# Patient Record
Sex: MALE | Race: WHITE | NOT HISPANIC OR LATINO | Employment: OTHER | URBAN - METROPOLITAN AREA
[De-identification: names, ages, dates, MRNs, and addresses within clinical notes are randomized per-mention and may not be internally consistent; named-entity substitution may affect disease eponyms.]

---

## 2019-03-12 ENCOUNTER — APPOINTMENT (OUTPATIENT)
Dept: PREADMISSION TESTING | Facility: HOSPITAL | Age: 54
End: 2019-03-12

## 2019-03-12 VITALS
TEMPERATURE: 97.3 F | BODY MASS INDEX: 31.84 KG/M2 | HEART RATE: 114 BPM | RESPIRATION RATE: 18 BRPM | OXYGEN SATURATION: 97 % | HEIGHT: 69 IN | DIASTOLIC BLOOD PRESSURE: 92 MMHG | SYSTOLIC BLOOD PRESSURE: 137 MMHG | WEIGHT: 215 LBS

## 2019-03-12 LAB
ANION GAP SERPL CALCULATED.3IONS-SCNC: 15.6 MMOL/L
BUN BLD-MCNC: 20 MG/DL (ref 6–20)
BUN/CREAT SERPL: 25 (ref 7–25)
CALCIUM SPEC-SCNC: 10 MG/DL (ref 8.6–10.5)
CHLORIDE SERPL-SCNC: 97 MMOL/L (ref 98–107)
CO2 SERPL-SCNC: 23.4 MMOL/L (ref 22–29)
CREAT BLD-MCNC: 0.8 MG/DL (ref 0.76–1.27)
DEPRECATED RDW RBC AUTO: 41.2 FL (ref 37–54)
ERYTHROCYTE [DISTWIDTH] IN BLOOD BY AUTOMATED COUNT: 11.9 % (ref 12.3–15.4)
GFR SERPL CREATININE-BSD FRML MDRD: 100 ML/MIN/1.73
GLUCOSE BLD-MCNC: 334 MG/DL (ref 65–99)
HCT VFR BLD AUTO: 49.5 % (ref 37.5–51)
HGB BLD-MCNC: 16.6 G/DL (ref 13–17.7)
MCH RBC QN AUTO: 31.6 PG (ref 26.6–33)
MCHC RBC AUTO-ENTMCNC: 33.5 G/DL (ref 31.5–35.7)
MCV RBC AUTO: 94.3 FL (ref 79–97)
PLATELET # BLD AUTO: 209 10*3/MM3 (ref 140–450)
PMV BLD AUTO: 9.9 FL (ref 6–12)
POTASSIUM BLD-SCNC: 4.4 MMOL/L (ref 3.5–5.2)
RBC # BLD AUTO: 5.25 10*6/MM3 (ref 4.14–5.8)
SODIUM BLD-SCNC: 136 MMOL/L (ref 136–145)
WBC NRBC COR # BLD: 7.81 10*3/MM3 (ref 3.4–10.8)

## 2019-03-12 PROCEDURE — 93010 ELECTROCARDIOGRAM REPORT: CPT | Performed by: INTERNAL MEDICINE

## 2019-03-12 PROCEDURE — 93005 ELECTROCARDIOGRAM TRACING: CPT

## 2019-03-12 PROCEDURE — 85027 COMPLETE CBC AUTOMATED: CPT | Performed by: UROLOGY

## 2019-03-12 PROCEDURE — 80048 BASIC METABOLIC PNL TOTAL CA: CPT | Performed by: UROLOGY

## 2019-03-12 PROCEDURE — 36415 COLL VENOUS BLD VENIPUNCTURE: CPT

## 2019-03-12 RX ORDER — MONTELUKAST SODIUM 10 MG/1
10 TABLET ORAL NIGHTLY
COMMUNITY
End: 2020-10-22

## 2019-03-12 RX ORDER — TIZANIDINE 4 MG/1
4 TABLET ORAL 3 TIMES DAILY PRN
COMMUNITY

## 2019-03-12 RX ORDER — HYDROCODONE BITARTRATE AND ACETAMINOPHEN 7.5; 325 MG/1; MG/1
1 TABLET ORAL EVERY 6 HOURS PRN
COMMUNITY
End: 2019-03-14 | Stop reason: HOSPADM

## 2019-03-12 RX ORDER — OXCARBAZEPINE 300 MG/1
300 TABLET, FILM COATED ORAL NIGHTLY
COMMUNITY
End: 2020-10-22

## 2019-03-12 RX ORDER — ROSUVASTATIN CALCIUM 5 MG/1
5 TABLET, COATED ORAL NIGHTLY
COMMUNITY

## 2019-03-12 RX ORDER — GABAPENTIN 300 MG/1
600 CAPSULE ORAL 3 TIMES DAILY
COMMUNITY

## 2019-03-12 RX ORDER — BENZTROPINE MESYLATE 1 MG/1
1 TABLET ORAL 2 TIMES DAILY
COMMUNITY

## 2019-03-12 RX ORDER — ZOLPIDEM TARTRATE 10 MG/1
10 TABLET ORAL NIGHTLY PRN
COMMUNITY
End: 2020-10-22

## 2019-03-12 RX ORDER — QUETIAPINE FUMARATE 100 MG/1
100 TABLET, FILM COATED ORAL NIGHTLY
COMMUNITY
End: 2020-10-22

## 2019-03-12 RX ORDER — ALBUTEROL SULFATE 90 UG/1
2 AEROSOL, METERED RESPIRATORY (INHALATION) EVERY 4 HOURS PRN
COMMUNITY

## 2019-03-12 RX ORDER — DIAZEPAM 5 MG/1
5 TABLET ORAL 2 TIMES DAILY PRN
COMMUNITY
End: 2020-10-22

## 2019-03-12 NOTE — DISCHARGE INSTRUCTIONS
ARRIVE DAY OF SURGERY AT 11:30 AM TO MAIN SURGERY          Take the following medications the morning of surgery with a small sip of water:    ALBUTEROL INHALER    General Instructions:  • Do not eat or drink anything after midnight the night before surgery.  • Infants may have breast milk up to four hours before surgery.  • Infants drinking formula may drink formula up to six hours before surgery.   • Patients who avoid smoking, chewing tobacco and alcohol for 4 weeks prior to surgery have a reduced risk of post-operative complications.  Quit smoking as many days before surgery as you can.  • Do not smoke, use chewing tobacco or drink alcohol the day of surgery.   • If applicable bring your C-PAP/ BI-PAP machine.  • Bring any papers given to you in the doctor’s office.  • Wear clean comfortable clothes and socks.  • Do not wear contact lenses or make-up.  Bring a case for your glasses.   • Bring crutches or walker if applicable.  • Remove all piercings.  Leave jewelry and any other valuables at home.  • Hair extensions with metal clips must be removed prior to surgery.  • The Pre-Admission Testing nurse will instruct you to bring medications if unable to obtain an accurate list in Pre-Admission Testing.          Preventing a Surgical Site Infection:  • For 2 to 3 days before surgery, avoid shaving with a razor because the razor can irritate skin and make it easier to develop an infection.    • Any areas of open skin can increase the risk of a post-operative wound infection by allowing bacteria to enter and travel throughout the body.  Notify your surgeon if you have any skin wounds / rashes even if it is not near the expected surgical site.  The area will need assessed to determine if surgery should be delayed until it is healed.  • The night prior to surgery sleep in a clean bed with clean clothing.  Do not allow pets to sleep with you.  • Shower on the morning of surgery using a fresh bar of anti-bacterial soap  (such as Dial) and clean washcloth.  Dry with a clean towel and dress in clean clothing.  • Ask your surgeon if you will be receiving antibiotics prior to surgery.  • Make sure you, your family, and all healthcare providers clean their hands with soap and water or an alcohol based hand  before caring for you or your wound.    Day of surgery:  Upon arrival, a Pre-op nurse and Anesthesiologist will review your health history, obtain vital signs, and answer questions you may have.  The only belongings needed at this time will be your home medications and if applicable your C-PAP/BI-PAP machine.  If you are staying overnight your family can leave the rest of your belongings in the car and bring them to your room later.  A Pre-op nurse will start an IV and you may receive medication in preparation for surgery, including something to help you relax.  Your family will be able to see you in the Pre-op area.  While you are in surgery your family should notify the waiting room  if they leave the waiting room area and provide a contact phone number.    Please be aware that surgery does come with discomfort.  We want to make every effort to control your discomfort so please discuss any uncontrolled symptoms with your nurse.   Your doctor will most likely have prescribed pain medications.      If you are going home after surgery you will receive individualized written care instructions before being discharged.  A responsible adult must drive you to and from the hospital on the day of your surgery and stay with you for 24 hours.    If you are staying overnight following surgery, you will be transported to your hospital room following the recovery period.  [facility] has all private rooms.    You have received a list of surgical assistants for your reference.  If you have any questions please call Pre-Admission Testing at [phone].  Deductibles and co-payments are collected on the day of  service. Please be prepared to pay the required co-pay, deductible or deposit on the day of service as defined by your plan.

## 2019-03-13 ENCOUNTER — ANESTHESIA EVENT (OUTPATIENT)
Dept: PERIOP | Facility: HOSPITAL | Age: 54
End: 2019-03-13

## 2019-03-13 ENCOUNTER — ANESTHESIA (OUTPATIENT)
Dept: PERIOP | Facility: HOSPITAL | Age: 54
End: 2019-03-13

## 2019-03-13 ENCOUNTER — HOSPITAL ENCOUNTER (OUTPATIENT)
Facility: HOSPITAL | Age: 54
Discharge: HOME | End: 2019-03-14
Attending: UROLOGY | Admitting: UROLOGY

## 2019-03-13 DIAGNOSIS — N52.01 ERECTILE DYSFUNCTION DUE TO ARTERIAL INSUFFICIENCY: Primary | ICD-10-CM

## 2019-03-13 LAB
GLUCOSE BLDC GLUCOMTR-MCNC: 252 MG/DL (ref 70–130)
GLUCOSE BLDC GLUCOMTR-MCNC: 270 MG/DL (ref 70–130)
GLUCOSE BLDC GLUCOMTR-MCNC: 272 MG/DL (ref 70–130)

## 2019-03-13 PROCEDURE — 25010000002 PROPOFOL 10 MG/ML EMULSION: Performed by: ANESTHESIOLOGY

## 2019-03-13 PROCEDURE — 63710000001 HYDROCODONE-ACETAMINOPHEN 7.5-325 MG TABLET: Performed by: ANESTHESIOLOGY

## 2019-03-13 PROCEDURE — 63710000001 SENNA-DOCUSATE 8.6-50 MG TABLET: Performed by: UROLOGY

## 2019-03-13 PROCEDURE — C1813 PROSTHESIS, PENILE, INFLATAB: HCPCS | Performed by: UROLOGY

## 2019-03-13 PROCEDURE — 63710000001 ROSUVASTATIN 5 MG TABLET: Performed by: UROLOGY

## 2019-03-13 PROCEDURE — 63710000001 QUETIAPINE 100 MG TABLET: Performed by: UROLOGY

## 2019-03-13 PROCEDURE — 63710000001 MONTELUKAST 10 MG TABLET: Performed by: UROLOGY

## 2019-03-13 PROCEDURE — 63710000001 OXCARBAZEPINE 300 MG TABLET: Performed by: UROLOGY

## 2019-03-13 PROCEDURE — A9270 NON-COVERED ITEM OR SERVICE: HCPCS | Performed by: UROLOGY

## 2019-03-13 PROCEDURE — 25010000002 ONDANSETRON PER 1 MG: Performed by: ANESTHESIOLOGY

## 2019-03-13 PROCEDURE — G0378 HOSPITAL OBSERVATION PER HR: HCPCS

## 2019-03-13 PROCEDURE — 25010000002 FLUCONAZOLE PER 200 MG: Performed by: UROLOGY

## 2019-03-13 PROCEDURE — 25010000002 HYDROMORPHONE 1 MG/ML SOLUTION: Performed by: UROLOGY

## 2019-03-13 PROCEDURE — 25010000002 ONDANSETRON PER 1 MG: Performed by: UROLOGY

## 2019-03-13 PROCEDURE — A9270 NON-COVERED ITEM OR SERVICE: HCPCS | Performed by: HOSPITALIST

## 2019-03-13 PROCEDURE — 25010000002 GENTAMICIN PER 80 MG: Performed by: UROLOGY

## 2019-03-13 PROCEDURE — A9270 NON-COVERED ITEM OR SERVICE: HCPCS | Performed by: ANESTHESIOLOGY

## 2019-03-13 PROCEDURE — 63710000001 BENZTROPINE 1 MG TABLET: Performed by: UROLOGY

## 2019-03-13 PROCEDURE — 63710000001 METOPROLOL TARTRATE 25 MG TABLET: Performed by: HOSPITALIST

## 2019-03-13 PROCEDURE — 25010000002 FENTANYL CITRATE (PF) 100 MCG/2ML SOLUTION: Performed by: ANESTHESIOLOGY

## 2019-03-13 PROCEDURE — 25010000002 HYDROMORPHONE PER 4 MG: Performed by: NURSE ANESTHETIST, CERTIFIED REGISTERED

## 2019-03-13 PROCEDURE — 63710000001 INSULIN LISPRO (HUMAN) PER 5 UNITS: Performed by: HOSPITALIST

## 2019-03-13 PROCEDURE — 63710000001 GABAPENTIN 300 MG CAPSULE: Performed by: UROLOGY

## 2019-03-13 PROCEDURE — 63710000001 CARIPRAZINE HCL 1.5 MG CAPSULE: Performed by: UROLOGY

## 2019-03-13 PROCEDURE — 63710000001 HYDROCODONE-ACETAMINOPHEN 7.5-325 MG TABLET: Performed by: UROLOGY

## 2019-03-13 PROCEDURE — 25010000002 HYDROMORPHONE PER 4 MG: Performed by: ANESTHESIOLOGY

## 2019-03-13 PROCEDURE — 25010000002 MIDAZOLAM PER 1 MG: Performed by: ANESTHESIOLOGY

## 2019-03-13 PROCEDURE — 82962 GLUCOSE BLOOD TEST: CPT

## 2019-03-13 PROCEDURE — 25010000002 VANCOMYCIN PER 500 MG: Performed by: UROLOGY

## 2019-03-13 DEVICE — RESVR PENILE CONCEAL 65TO100ML: Type: IMPLANTABLE DEVICE | Site: PENIS | Status: FUNCTIONAL

## 2019-03-13 DEVICE — EXT REAR/TP PENILE SPECTRA 2CM: Type: IMPLANTABLE DEVICE | Site: PENIS | Status: FUNCTIONAL

## 2019-03-13 DEVICE — PUMP CYL PENILE PRECONN MS IZ 21CM W/TBG 12CM: Type: IMPLANTABLE DEVICE | Site: PENIS | Status: FUNCTIONAL

## 2019-03-13 RX ORDER — HYDROCODONE BITARTRATE AND ACETAMINOPHEN 7.5; 325 MG/1; MG/1
1 TABLET ORAL EVERY 6 HOURS PRN
Status: DISCONTINUED | OUTPATIENT
Start: 2019-03-13 | End: 2019-03-14 | Stop reason: HOSPADM

## 2019-03-13 RX ORDER — SODIUM CHLORIDE, SODIUM LACTATE, POTASSIUM CHLORIDE, CALCIUM CHLORIDE 600; 310; 30; 20 MG/100ML; MG/100ML; MG/100ML; MG/100ML
9 INJECTION, SOLUTION INTRAVENOUS CONTINUOUS
Status: DISCONTINUED | OUTPATIENT
Start: 2019-03-13 | End: 2019-03-13

## 2019-03-13 RX ORDER — ONDANSETRON 2 MG/ML
4 INJECTION INTRAMUSCULAR; INTRAVENOUS ONCE AS NEEDED
Status: DISCONTINUED | OUTPATIENT
Start: 2019-03-13 | End: 2019-03-13

## 2019-03-13 RX ORDER — OXCARBAZEPINE 300 MG/1
300 TABLET, FILM COATED ORAL NIGHTLY
Status: DISCONTINUED | OUTPATIENT
Start: 2019-03-13 | End: 2019-03-14 | Stop reason: HOSPADM

## 2019-03-13 RX ORDER — LABETALOL HYDROCHLORIDE 5 MG/ML
INJECTION, SOLUTION INTRAVENOUS AS NEEDED
Status: DISCONTINUED | OUTPATIENT
Start: 2019-03-13 | End: 2019-03-13 | Stop reason: SURG

## 2019-03-13 RX ORDER — DIPHENHYDRAMINE HCL 25 MG
25 CAPSULE ORAL
Status: DISCONTINUED | OUTPATIENT
Start: 2019-03-13 | End: 2019-03-13

## 2019-03-13 RX ORDER — QUETIAPINE FUMARATE 100 MG/1
100 TABLET, FILM COATED ORAL NIGHTLY
Status: DISCONTINUED | OUTPATIENT
Start: 2019-03-13 | End: 2019-03-14 | Stop reason: HOSPADM

## 2019-03-13 RX ORDER — ZOLPIDEM TARTRATE 5 MG/1
10 TABLET ORAL NIGHTLY PRN
Status: DISCONTINUED | OUTPATIENT
Start: 2019-03-13 | End: 2019-03-14 | Stop reason: HOSPADM

## 2019-03-13 RX ORDER — MAGNESIUM HYDROXIDE 1200 MG/15ML
LIQUID ORAL AS NEEDED
Status: DISCONTINUED | OUTPATIENT
Start: 2019-03-13 | End: 2019-03-13 | Stop reason: HOSPADM

## 2019-03-13 RX ORDER — ROSUVASTATIN CALCIUM 5 MG/1
5 TABLET, COATED ORAL DAILY
Status: DISCONTINUED | OUTPATIENT
Start: 2019-03-13 | End: 2019-03-14 | Stop reason: HOSPADM

## 2019-03-13 RX ORDER — GENTAMICIN SULFATE 60 MG/50ML
120 INJECTION, SOLUTION INTRAVENOUS ONCE
Status: COMPLETED | OUTPATIENT
Start: 2019-03-13 | End: 2019-03-13

## 2019-03-13 RX ORDER — VANCOMYCIN HYDROCHLORIDE 1 G/200ML
1000 INJECTION, SOLUTION INTRAVENOUS ONCE
Status: COMPLETED | OUTPATIENT
Start: 2019-03-13 | End: 2019-03-13

## 2019-03-13 RX ORDER — FENTANYL CITRATE 50 UG/ML
50 INJECTION, SOLUTION INTRAMUSCULAR; INTRAVENOUS
Status: DISCONTINUED | OUTPATIENT
Start: 2019-03-13 | End: 2019-03-13

## 2019-03-13 RX ORDER — LIDOCAINE HYDROCHLORIDE 10 MG/ML
0.5 INJECTION, SOLUTION EPIDURAL; INFILTRATION; INTRACAUDAL; PERINEURAL ONCE AS NEEDED
Status: DISCONTINUED | OUTPATIENT
Start: 2019-03-13 | End: 2019-03-13 | Stop reason: HOSPADM

## 2019-03-13 RX ORDER — TIZANIDINE 4 MG/1
4 TABLET ORAL 3 TIMES DAILY PRN
Status: DISCONTINUED | OUTPATIENT
Start: 2019-03-13 | End: 2019-03-14 | Stop reason: HOSPADM

## 2019-03-13 RX ORDER — HYDROMORPHONE HCL 110MG/55ML
PATIENT CONTROLLED ANALGESIA SYRINGE INTRAVENOUS AS NEEDED
Status: DISCONTINUED | OUTPATIENT
Start: 2019-03-13 | End: 2019-03-13 | Stop reason: SURG

## 2019-03-13 RX ORDER — FLUCONAZOLE 2 MG/ML
400 INJECTION, SOLUTION INTRAVENOUS DAILY
Status: COMPLETED | OUTPATIENT
Start: 2019-03-13 | End: 2019-03-13

## 2019-03-13 RX ORDER — NALOXONE HCL 0.4 MG/ML
0.2 VIAL (ML) INJECTION AS NEEDED
Status: DISCONTINUED | OUTPATIENT
Start: 2019-03-13 | End: 2019-03-13

## 2019-03-13 RX ORDER — VANCOMYCIN HYDROCHLORIDE 1 G/200ML
1000 INJECTION, SOLUTION INTRAVENOUS EVERY 24 HOURS
Status: COMPLETED | OUTPATIENT
Start: 2019-03-14 | End: 2019-03-14

## 2019-03-13 RX ORDER — NALOXONE HCL 0.4 MG/ML
0.1 VIAL (ML) INJECTION
Status: DISCONTINUED | OUTPATIENT
Start: 2019-03-13 | End: 2019-03-14 | Stop reason: HOSPADM

## 2019-03-13 RX ORDER — GENTAMICIN SULFATE 60 MG/50ML
120 INJECTION, SOLUTION INTRAVENOUS EVERY 8 HOURS
Status: DISCONTINUED | OUTPATIENT
Start: 2019-03-14 | End: 2019-03-14 | Stop reason: HOSPADM

## 2019-03-13 RX ORDER — SENNA AND DOCUSATE SODIUM 50; 8.6 MG/1; MG/1
2 TABLET, FILM COATED ORAL NIGHTLY
Status: DISCONTINUED | OUTPATIENT
Start: 2019-03-13 | End: 2019-03-14 | Stop reason: HOSPADM

## 2019-03-13 RX ORDER — BENZTROPINE MESYLATE 1 MG/1
1 TABLET ORAL EVERY 12 HOURS SCHEDULED
Status: DISCONTINUED | OUTPATIENT
Start: 2019-03-13 | End: 2019-03-14 | Stop reason: HOSPADM

## 2019-03-13 RX ORDER — ONDANSETRON 4 MG/1
4 TABLET, ORALLY DISINTEGRATING ORAL EVERY 6 HOURS PRN
Status: DISCONTINUED | OUTPATIENT
Start: 2019-03-13 | End: 2019-03-14 | Stop reason: HOSPADM

## 2019-03-13 RX ORDER — MONTELUKAST SODIUM 10 MG/1
10 TABLET ORAL NIGHTLY
Status: DISCONTINUED | OUTPATIENT
Start: 2019-03-13 | End: 2019-03-14 | Stop reason: HOSPADM

## 2019-03-13 RX ORDER — PROMETHAZINE HYDROCHLORIDE 25 MG/1
25 TABLET ORAL ONCE AS NEEDED
Status: DISCONTINUED | OUTPATIENT
Start: 2019-03-13 | End: 2019-03-13

## 2019-03-13 RX ORDER — PROMETHAZINE HYDROCHLORIDE 25 MG/ML
12.5 INJECTION, SOLUTION INTRAMUSCULAR; INTRAVENOUS ONCE AS NEEDED
Status: DISCONTINUED | OUTPATIENT
Start: 2019-03-13 | End: 2019-03-13

## 2019-03-13 RX ORDER — MIDAZOLAM HYDROCHLORIDE 1 MG/ML
2 INJECTION INTRAMUSCULAR; INTRAVENOUS
Status: DISCONTINUED | OUTPATIENT
Start: 2019-03-13 | End: 2019-03-13 | Stop reason: HOSPADM

## 2019-03-13 RX ORDER — FLUMAZENIL 0.1 MG/ML
0.2 INJECTION INTRAVENOUS AS NEEDED
Status: DISCONTINUED | OUTPATIENT
Start: 2019-03-13 | End: 2019-03-13

## 2019-03-13 RX ORDER — FENTANYL CITRATE 50 UG/ML
50 INJECTION, SOLUTION INTRAMUSCULAR; INTRAVENOUS
Status: DISCONTINUED | OUTPATIENT
Start: 2019-03-13 | End: 2019-03-13 | Stop reason: HOSPADM

## 2019-03-13 RX ORDER — FAMOTIDINE 10 MG/ML
20 INJECTION, SOLUTION INTRAVENOUS ONCE
Status: COMPLETED | OUTPATIENT
Start: 2019-03-13 | End: 2019-03-13

## 2019-03-13 RX ORDER — OXYCODONE AND ACETAMINOPHEN 7.5; 325 MG/1; MG/1
1 TABLET ORAL ONCE AS NEEDED
Status: DISCONTINUED | OUTPATIENT
Start: 2019-03-13 | End: 2019-03-13

## 2019-03-13 RX ORDER — HYDROMORPHONE HYDROCHLORIDE 1 MG/ML
0.5 INJECTION, SOLUTION INTRAMUSCULAR; INTRAVENOUS; SUBCUTANEOUS
Status: DISCONTINUED | OUTPATIENT
Start: 2019-03-13 | End: 2019-03-13

## 2019-03-13 RX ORDER — DEXTROSE MONOHYDRATE 25 G/50ML
25 INJECTION, SOLUTION INTRAVENOUS
Status: DISCONTINUED | OUTPATIENT
Start: 2019-03-13 | End: 2019-03-14 | Stop reason: HOSPADM

## 2019-03-13 RX ORDER — LIDOCAINE HYDROCHLORIDE 20 MG/ML
INJECTION, SOLUTION INFILTRATION; PERINEURAL AS NEEDED
Status: DISCONTINUED | OUTPATIENT
Start: 2019-03-13 | End: 2019-03-13 | Stop reason: SURG

## 2019-03-13 RX ORDER — ONDANSETRON 2 MG/ML
4 INJECTION INTRAMUSCULAR; INTRAVENOUS EVERY 6 HOURS PRN
Status: DISCONTINUED | OUTPATIENT
Start: 2019-03-13 | End: 2019-03-14 | Stop reason: HOSPADM

## 2019-03-13 RX ORDER — MIDAZOLAM HYDROCHLORIDE 1 MG/ML
1 INJECTION INTRAMUSCULAR; INTRAVENOUS
Status: DISCONTINUED | OUTPATIENT
Start: 2019-03-13 | End: 2019-03-13 | Stop reason: HOSPADM

## 2019-03-13 RX ORDER — SODIUM CHLORIDE 9 MG/ML
INJECTION, SOLUTION INTRAVENOUS AS NEEDED
Status: DISCONTINUED | OUTPATIENT
Start: 2019-03-13 | End: 2019-03-13 | Stop reason: HOSPADM

## 2019-03-13 RX ORDER — HYDRALAZINE HYDROCHLORIDE 20 MG/ML
5 INJECTION INTRAMUSCULAR; INTRAVENOUS
Status: DISCONTINUED | OUTPATIENT
Start: 2019-03-13 | End: 2019-03-13

## 2019-03-13 RX ORDER — PROPOFOL 10 MG/ML
VIAL (ML) INTRAVENOUS AS NEEDED
Status: DISCONTINUED | OUTPATIENT
Start: 2019-03-13 | End: 2019-03-13 | Stop reason: SURG

## 2019-03-13 RX ORDER — HYDROCODONE BITARTRATE AND ACETAMINOPHEN 7.5; 325 MG/1; MG/1
1 TABLET ORAL ONCE AS NEEDED
Status: COMPLETED | OUTPATIENT
Start: 2019-03-13 | End: 2019-03-13

## 2019-03-13 RX ORDER — ALBUTEROL SULFATE 90 UG/1
2 AEROSOL, METERED RESPIRATORY (INHALATION) EVERY 4 HOURS PRN
Status: DISCONTINUED | OUTPATIENT
Start: 2019-03-13 | End: 2019-03-13 | Stop reason: CLARIF

## 2019-03-13 RX ORDER — SODIUM CHLORIDE 9 MG/ML
100 INJECTION, SOLUTION INTRAVENOUS CONTINUOUS
Status: DISCONTINUED | OUTPATIENT
Start: 2019-03-13 | End: 2019-03-14 | Stop reason: HOSPADM

## 2019-03-13 RX ORDER — GABAPENTIN 300 MG/1
300 CAPSULE ORAL 3 TIMES DAILY
Status: DISCONTINUED | OUTPATIENT
Start: 2019-03-13 | End: 2019-03-14 | Stop reason: HOSPADM

## 2019-03-13 RX ORDER — ONDANSETRON 2 MG/ML
INJECTION INTRAMUSCULAR; INTRAVENOUS AS NEEDED
Status: DISCONTINUED | OUTPATIENT
Start: 2019-03-13 | End: 2019-03-13 | Stop reason: SURG

## 2019-03-13 RX ORDER — FENTANYL CITRATE 50 UG/ML
INJECTION, SOLUTION INTRAMUSCULAR; INTRAVENOUS AS NEEDED
Status: DISCONTINUED | OUTPATIENT
Start: 2019-03-13 | End: 2019-03-13 | Stop reason: SURG

## 2019-03-13 RX ORDER — DIAZEPAM 5 MG/1
5 TABLET ORAL 2 TIMES DAILY PRN
Status: DISCONTINUED | OUTPATIENT
Start: 2019-03-13 | End: 2019-03-14 | Stop reason: HOSPADM

## 2019-03-13 RX ORDER — ALBUTEROL SULFATE 2.5 MG/3ML
2.5 SOLUTION RESPIRATORY (INHALATION) EVERY 4 HOURS PRN
Status: DISCONTINUED | OUTPATIENT
Start: 2019-03-13 | End: 2019-03-14 | Stop reason: HOSPADM

## 2019-03-13 RX ORDER — SODIUM CHLORIDE 0.9 % (FLUSH) 0.9 %
1-10 SYRINGE (ML) INJECTION AS NEEDED
Status: DISCONTINUED | OUTPATIENT
Start: 2019-03-13 | End: 2019-03-13 | Stop reason: HOSPADM

## 2019-03-13 RX ORDER — LABETALOL HYDROCHLORIDE 5 MG/ML
5 INJECTION, SOLUTION INTRAVENOUS
Status: DISCONTINUED | OUTPATIENT
Start: 2019-03-13 | End: 2019-03-13

## 2019-03-13 RX ORDER — PROMETHAZINE HYDROCHLORIDE 25 MG/1
25 SUPPOSITORY RECTAL ONCE AS NEEDED
Status: DISCONTINUED | OUTPATIENT
Start: 2019-03-13 | End: 2019-03-13

## 2019-03-13 RX ORDER — EPHEDRINE SULFATE 50 MG/ML
5 INJECTION, SOLUTION INTRAVENOUS ONCE AS NEEDED
Status: DISCONTINUED | OUTPATIENT
Start: 2019-03-13 | End: 2019-03-13

## 2019-03-13 RX ORDER — DIPHENHYDRAMINE HYDROCHLORIDE 50 MG/ML
12.5 INJECTION INTRAMUSCULAR; INTRAVENOUS
Status: DISCONTINUED | OUTPATIENT
Start: 2019-03-13 | End: 2019-03-13

## 2019-03-13 RX ORDER — NICOTINE POLACRILEX 4 MG
15 LOZENGE BUCCAL
Status: DISCONTINUED | OUTPATIENT
Start: 2019-03-13 | End: 2019-03-14 | Stop reason: HOSPADM

## 2019-03-13 RX ORDER — ACETAMINOPHEN 325 MG/1
650 TABLET ORAL ONCE AS NEEDED
Status: DISCONTINUED | OUTPATIENT
Start: 2019-03-13 | End: 2019-03-13

## 2019-03-13 RX ORDER — ONDANSETRON 4 MG/1
4 TABLET, FILM COATED ORAL EVERY 6 HOURS PRN
Status: DISCONTINUED | OUTPATIENT
Start: 2019-03-13 | End: 2019-03-14 | Stop reason: HOSPADM

## 2019-03-13 RX ADMIN — INSULIN LISPRO 6 UNITS: 100 INJECTION, SOLUTION INTRAVENOUS; SUBCUTANEOUS at 22:12

## 2019-03-13 RX ADMIN — MONTELUKAST SODIUM 10 MG: 10 TABLET, FILM COATED ORAL at 21:35

## 2019-03-13 RX ADMIN — PROPOFOL 200 MG: 10 INJECTION, EMULSION INTRAVENOUS at 15:45

## 2019-03-13 RX ADMIN — LABETALOL HYDROCHLORIDE 5 MG: 5 INJECTION, SOLUTION INTRAVENOUS at 16:35

## 2019-03-13 RX ADMIN — SODIUM CHLORIDE, POTASSIUM CHLORIDE, SODIUM LACTATE AND CALCIUM CHLORIDE 9 ML/HR: 600; 310; 30; 20 INJECTION, SOLUTION INTRAVENOUS at 11:59

## 2019-03-13 RX ADMIN — HYDROMORPHONE HYDROCHLORIDE 0.5 MG: 1 INJECTION, SOLUTION INTRAMUSCULAR; INTRAVENOUS; SUBCUTANEOUS at 18:19

## 2019-03-13 RX ADMIN — FLUCONAZOLE, SODIUM CHLORIDE 400 MG: 2 INJECTION INTRAVENOUS at 21:35

## 2019-03-13 RX ADMIN — LIDOCAINE HYDROCHLORIDE 100 MG: 20 INJECTION, SOLUTION INFILTRATION; PERINEURAL at 15:45

## 2019-03-13 RX ADMIN — LABETALOL HYDROCHLORIDE 5 MG: 5 INJECTION, SOLUTION INTRAVENOUS at 17:43

## 2019-03-13 RX ADMIN — HYDROMORPHONE HYDROCHLORIDE 1 MG: 1 INJECTION, SOLUTION INTRAMUSCULAR; INTRAVENOUS; SUBCUTANEOUS at 19:45

## 2019-03-13 RX ADMIN — BENZTROPINE MESYLATE 1 MG: 1 TABLET ORAL at 22:14

## 2019-03-13 RX ADMIN — QUETIAPINE FUMARATE 100 MG: 100 TABLET ORAL at 21:35

## 2019-03-13 RX ADMIN — FENTANYL CITRATE 50 MCG: 50 INJECTION INTRAMUSCULAR; INTRAVENOUS at 16:14

## 2019-03-13 RX ADMIN — LABETALOL HYDROCHLORIDE 5 MG: 5 INJECTION, SOLUTION INTRAVENOUS at 18:18

## 2019-03-13 RX ADMIN — MIDAZOLAM 1 MG: 1 INJECTION INTRAMUSCULAR; INTRAVENOUS at 13:59

## 2019-03-13 RX ADMIN — SODIUM CHLORIDE 100 ML/HR: 9 INJECTION, SOLUTION INTRAVENOUS at 19:45

## 2019-03-13 RX ADMIN — ONDANSETRON HYDROCHLORIDE 4 MG: 2 SOLUTION INTRAMUSCULAR; INTRAVENOUS at 20:37

## 2019-03-13 RX ADMIN — HYDROMORPHONE HYDROCHLORIDE 0.5 MG: 2 INJECTION INTRAMUSCULAR; INTRAVENOUS; SUBCUTANEOUS at 16:45

## 2019-03-13 RX ADMIN — GABAPENTIN 300 MG: 300 CAPSULE ORAL at 21:35

## 2019-03-13 RX ADMIN — FENTANYL CITRATE 50 MCG: 50 INJECTION, SOLUTION INTRAMUSCULAR; INTRAVENOUS at 18:03

## 2019-03-13 RX ADMIN — SENNOSIDES AND DOCUSATE SODIUM 2 TABLET: 8.6; 5 TABLET ORAL at 22:12

## 2019-03-13 RX ADMIN — METOPROLOL TARTRATE 25 MG: 25 TABLET ORAL at 22:12

## 2019-03-13 RX ADMIN — ONDANSETRON 4 MG: 2 INJECTION INTRAMUSCULAR; INTRAVENOUS at 16:18

## 2019-03-13 RX ADMIN — FAMOTIDINE 20 MG: 10 INJECTION INTRAVENOUS at 13:54

## 2019-03-13 RX ADMIN — SODIUM CHLORIDE, POTASSIUM CHLORIDE, SODIUM LACTATE AND CALCIUM CHLORIDE: 600; 310; 30; 20 INJECTION, SOLUTION INTRAVENOUS at 15:41

## 2019-03-13 RX ADMIN — GENTAMICIN SULFATE 120 MG: 60 INJECTION, SOLUTION INTRAVENOUS at 15:50

## 2019-03-13 RX ADMIN — HYDROCODONE BITARTRATE AND ACETAMINOPHEN 1 TABLET: 7.5; 325 TABLET ORAL at 18:11

## 2019-03-13 RX ADMIN — HYDROMORPHONE HYDROCHLORIDE 0.5 MG: 2 INJECTION INTRAMUSCULAR; INTRAVENOUS; SUBCUTANEOUS at 17:25

## 2019-03-13 RX ADMIN — FENTANYL CITRATE 50 MCG: 50 INJECTION INTRAMUSCULAR; INTRAVENOUS at 16:25

## 2019-03-13 RX ADMIN — HYDROMORPHONE HYDROCHLORIDE 0.5 MG: 1 INJECTION, SOLUTION INTRAMUSCULAR; INTRAVENOUS; SUBCUTANEOUS at 18:27

## 2019-03-13 RX ADMIN — LABETALOL HYDROCHLORIDE 5 MG: 5 INJECTION, SOLUTION INTRAVENOUS at 17:47

## 2019-03-13 RX ADMIN — HYDROCODONE BITARTRATE AND ACETAMINOPHEN 1 TABLET: 7.5; 325 TABLET ORAL at 22:13

## 2019-03-13 RX ADMIN — VANCOMYCIN HYDROCHLORIDE 1000 MG: 1 INJECTION, SOLUTION INTRAVENOUS at 15:12

## 2019-03-13 RX ADMIN — MIDAZOLAM 1 MG: 1 INJECTION INTRAMUSCULAR; INTRAVENOUS at 13:54

## 2019-03-13 RX ADMIN — OXCARBAZEPINE 300 MG: 300 TABLET, FILM COATED ORAL at 22:14

## 2019-03-13 RX ADMIN — ROSUVASTATIN CALCIUM 5 MG: 5 TABLET, FILM COATED ORAL at 21:35

## 2019-03-13 RX ADMIN — LABETALOL HYDROCHLORIDE 5 MG: 5 INJECTION, SOLUTION INTRAVENOUS at 17:50

## 2019-03-13 RX ADMIN — CARIPRAZINE 3 MG: 1.5 CAPSULE, GELATIN COATED ORAL at 22:14

## 2019-03-13 NOTE — OP NOTE
PENILE PROSTHESIS PLACEMENT  Procedure Note    Richard Kim  3/13/2019    Pre-op Diagnosis:   Erectile Dysfunction    Post-op Diagnosis:     Post-Op Diagnosis Codes:     * Erectile dysfunction due to arterial insufficiency [N52.01]    Procedure(s):  PENILE PROSTHESIS PLACEMENT    Surgeon(s):  Campos Hopkins MD    Anesthesia: General    Staff:   Circulator: Jeanine Noel RN  Scrub Person: Latricia Coreas; Aquilino Hein    Estimated Blood Loss: 50 mL    Specimens:                * No orders in the log *      Drains:   Urethral Catheter Latex;Double-lumen 16 Fr. (Active)       Findings: A 3 cm corporal bodies with implantation of 21 cm +2 cm rear tip extenders  CX prosthesis, crossover noted at the mid corporal body    Complications: None    Indications: 53-year-old gentleman with end-stage erectile dysfunction for a IPP    Procedure: She was taken off suite given general anesthesia.  Placed in supine position prepped and draped in sterile fashion.  Kendall catheter was placed.  Midline penoscrotal incision was made.  The dissection was then made down the corporal bodies and a [place] retractor was utilized.  A corporotomy was made on each side deep and lateral.  Prolene sutures were placed as stay sutures.  The corporal bodies and then dilated to 13 dilator and then measured to be 23 cm on each side with a corporotomy made at about 9 cm deep.  We irrigated both with bacitracin solution.  While they were prepping the prosthesis we elected to use a  CX prosthesis just because it was a difficult dilation.  I then tunneled down adjacent to the cord structures popped through the inguinal canal into the preperitoneal space where it placed our conceal reservoir with 100 cc of sterile water.  This was seated well.  I irrigated this with bacitracin solution.  I made a pocket in the right hemiscrotum for the pump.  He is right-handed.  I then passed the sutures to reach to the corporal bodies coming out  the glands and then pulled our prosthetic implant on each side into the distal portion of the corporal bodies and then placed the proximal sections down.  We then cycled the prosthesis.  The tips of both cylinders were even and very distal.  The penis was straight and rigid.  We then deflated it completely.  We irrigated the corporotomies 1 more time and then closed our corporotomies with 2-0 PDS careful not to puncture either of the implantable cylinders.  We then placed our pump into the right hemiscrotum.  We made a single connection with the reservoir cycle 1 more time then deflated it partially filling up.  We then closed the scrotal wound in 3 layers with 2-0 Vicryl and 4-0 Vicryl and then skin glue.  He was awoken and taken to recovery in stable condition.      Campos Hopkins MD     Date: 3/13/2019  Time: 5:35 PM

## 2019-03-13 NOTE — ANESTHESIA PREPROCEDURE EVALUATION
Anesthesia Evaluation     Patient summary reviewed   no history of anesthetic complications:  NPO Solid Status: > 8 hours  NPO Liquid Status: > 2 hours           Airway   Mallampati: III  TM distance: >3 FB  Neck ROM: full  Possible difficult intubation and Small opening  Dental    (+) poor dentition    Pulmonary     breath sounds clear to auscultation  (+) COPD, asthma, sleep apnea,   (-) shortness of breath, not a smoker  Cardiovascular   Exercise tolerance: good (4-7 METS)    ECG reviewed  Rhythm: regular  Rate: normal    (-) angina, DAWN    ROS comment: EKG: Sinus tachycardia  NO PRIOR TRACING AVAILABLE FOR COMPARISON    Neuro/Psych  (+) psychiatric history Anxiety and Depression,     GI/Hepatic/Renal/Endo    (+)   diabetes mellitus,     Musculoskeletal     Abdominal    Substance History      OB/GYN          Other                      Anesthesia Plan    ASA 3     general     intravenous induction   Anesthetic plan, all risks, benefits, and alternatives have been provided, discussed and informed consent has been obtained with: patient and spouse/significant other.

## 2019-03-13 NOTE — ANESTHESIA PROCEDURE NOTES
Airway  Urgency: elective    Date/Time: 3/13/2019 3:49 PM  End Time:3/13/2019 3:49 PM  Airway not difficult    General Information and Staff    Patient location during procedure: OR  Anesthesiologist: Campos Laws MD    Indications and Patient Condition  Indications for airway management: airway protection    Preoxygenated: yes  Mask difficulty assessment: 0 - not attempted    Final Airway Details  Final airway type: supraglottic airway      Successful airway: classic  Size 5    Number of attempts at approach: 1

## 2019-03-13 NOTE — H&P
[facility] History and Physical    Patient Care Team:  System, Provider Not In as PCP - General    Chief complaint erectile dysfunction    Subjective     Patient is a 53 y.o. male presents with erectile dysfunction failed to resistant resolve with conservative management including oral medications injection therapy back in therapy all due to vascular insufficiency from multiple other health issues including diabetes now presents for implantation of a penile implant.       Review of Systems   Pertinent items are noted in HPI    Past Medical History:   Diagnosis Date   • Anxiety and depression    • Asthma    • Bipolar disorder (CMS/[facility])    • COPD (chronic obstructive pulmonary disease) (CMS/[facility])    • Diabetes mellitus (CMS/[facility])    • Erectile dysfunction    • Low back pain    • Sleep apnea     DOESN'T USE MACHINE     Past Surgical History:   Procedure Laterality Date   • NO PAST SURGERIES     • SINUS SURGERY       Family History   Problem Relation Age of Onset   • Malig Hyperthermia Neg Hx      Social History     Tobacco Use   • Smoking status: Never Smoker   • Smokeless tobacco: Never Used   Substance Use Topics   • Alcohol use: Yes     Alcohol/week: 3.6 oz     Types: 6 Cans of beer per week   • Drug use: No       Meds:  Medications Prior to Admission   Medication Sig Dispense Refill Last Dose   • albuterol sulfate  (90 Base) MCG/ACT inhaler Inhale 2 puffs Every 4 (Four) Hours As Needed for Wheezing.   3/12/2019 at 2100   • benztropine (COGENTIN) 1 MG tablet Take 1 mg by mouth 2 (Two) Times a Day.   3/11/2019   • Canagliflozin (INVOKANA) 300 MG tablet Take 300 mg by mouth Daily.   3/10/2019   • Cariprazine HCl (VRAYLAR) 3 MG capsule Take 3 mg by mouth Every Night.   3/10/2019   • diazePAM (VALIUM) 5 MG tablet Take 5 mg by mouth 2 (Two) Times a Day As Needed for Anxiety.   3/10/2019   • Dulaglutide (TRULICITY) 1.5 MG/0.5ML solution pen-injector Inject 1.5 mg under the skin into the appropriate area as  "directed 1 (One) Time Per Week. MONDAYS   3/11/2019   • gabapentin (NEURONTIN) 300 MG capsule Take 300 mg by mouth 3 (Three) Times a Day.   3/10/2019   • HYDROcodone-acetaminophen (NORCO) 7.5-325 MG per tablet Take 1 tablet by mouth Every 6 (Six) Hours As Needed for Moderate Pain .   3/10/2019   • montelukast (SINGULAIR) 10 MG tablet Take 10 mg by mouth Every Night.   3/10/2019   • OXcarbazepine (TRILEPTAL) 300 MG tablet Take 300 mg by mouth Every Night.   3/10/2019   • OXcarbazepine  MG tablet sustained-release 24 hour Take 600 mg by mouth 2 (Two) Times a Day.   3/10/2019   • QUEtiapine (SEROquel) 100 MG tablet Take 100 mg by mouth Every Night.   3/10/2019   • rosuvastatin (CRESTOR) 5 MG tablet Take 5 mg by mouth Daily.   3/10/2019   • tiZANidine (ZANAFLEX) 4 MG tablet Take 4 mg by mouth 3 (Three) Times a Day As Needed for Muscle Spasms.   3/10/2019   • zolpidem (AMBIEN) 10 MG tablet Take 10 mg by mouth At Night As Needed for Sleep.   3/10/2019       Allergies:  Aspirin; Banana; and Penicillins    Debilities:  None    Objective     Vital Signs  Temp:  [98.2 °F (36.8 °C)] 98.2 °F (36.8 °C)  Heart Rate:  [112] 112  BP: (163)/(101) 163/101  No intake or output data in the 24 hours ending 03/13/19 1253  Flowsheet Rows      First Filed Value   Admission Height  175.3 cm (69.02\") Documented at 03/13/2019 1152   Admission Weight  97.1 kg (214 lb 2 oz) Documented at 03/13/2019 1152           Physical Exam:      General Appearance:    Alert, cooperative, in no acute distress   Head:    Normocephalic, without obvious abnormality, atraumatic   Eyes:            Lids and lashes normal, conjunctivae and sclerae normal, no   icterus, no pallor, corneas clear, PERRLA   Ears:    Ears appear intact with no abnormalities noted   Throat:   No oral lesions, no thrush, oral mucosa moist   Neck:   No adenopathy, supple, trachea midline, no thyromegaly, no   carotid bruit, no JVD   Back:     No kyphosis present, no scoliosis " present, no skin lesions,      erythema or scars, no tenderness to percussion or                   palpation,   range of motion normal   Lungs:     Clear to auscultation,respirations regular, even and                  unlabored    Heart:    Regular rhythm and normal rate, normal S1 and S2, no            murmur, no gallop, no rub, no click   Chest Wall:    No abnormalities observed   Abdomen:     Normal bowel sounds, no masses, no organomegaly, soft        non-tender, non-distended, no guarding, no rebound                tenderness   Rectal:     Deferred   Extremities:   Moves all extremities well, no edema, no cyanosis, no             redness   Pulses:   Pulses palpable and equal bilaterally   Skin:   No bleeding, bruising or rash   Lymph nodes:   No palpable adenopathy   Neurologic:   Cranial nerves 2 - 12 grossly intact, sensation intact, DTR       present and equal bilaterally     Results Review:    I reviewed the patient's new clinical results.  Results for orders placed or performed during the hospital encounter of 03/13/19   POC Glucose Once   Result Value Ref Range    Glucose 252 (H) 70 - 130 mg/dL        Assessment:  Erectile dysfunction    Plan:    Penile prosthesis implantation.    I discussed the patients findings and my recommendations with patient.     Campos Hopkins MD  03/13/19  12:53 PM

## 2019-03-13 NOTE — ANESTHESIA POSTPROCEDURE EVALUATION
"Patient: Richard Kim    Procedure Summary     Date:  03/13/19 Room / Location:  [facility] OR  / [facility] MAIN OR    Anesthesia Start:  1541 Anesthesia Stop:  1738    Procedure:  PENILE PROSTHESIS PLACEMENT (N/A Penis) Diagnosis:       Erectile dysfunction due to arterial insufficiency      (Erectile Dysfunction)    Surgeon:  Campos Hopkins MD Provider:  Jacky Castrejon MD    Anesthesia Type:  general ASA Status:  3          Anesthesia Type: general  Last vitals  BP   (!) 168/105 (03/13/19 1820)   Temp   37.1 °C (98.8 °F) (03/13/19 1636)   Pulse   95 (03/13/19 1820)   Resp   12 (03/13/19 1820)     SpO2   95 % (03/13/19 1820)     Post Anesthesia Care and Evaluation    Patient location during evaluation: PACU  Patient participation: complete - patient participated  Level of consciousness: awake and alert  Pain management: adequate  Airway patency: patent  Anesthetic complications: No anesthetic complications    Cardiovascular status: acceptable  Respiratory status: acceptable  Hydration status: acceptable    Comments: BP (!) 168/105   Pulse 95   Temp 37.1 °C (98.8 °F) (Oral)   Resp 12   Ht 175.3 cm (69.02\")   Wt 97.1 kg (214 lb 2 oz)   SpO2 95%   BMI 31.61 kg/m²       "

## 2019-03-14 VITALS
HEIGHT: 69 IN | SYSTOLIC BLOOD PRESSURE: 104 MMHG | TEMPERATURE: 97.8 F | HEART RATE: 93 BPM | BODY MASS INDEX: 31.72 KG/M2 | DIASTOLIC BLOOD PRESSURE: 75 MMHG | RESPIRATION RATE: 18 BRPM | WEIGHT: 214.13 LBS | OXYGEN SATURATION: 94 %

## 2019-03-14 LAB
ANION GAP SERPL CALCULATED.3IONS-SCNC: 11.2 MMOL/L
BUN BLD-MCNC: 11 MG/DL (ref 6–20)
BUN/CREAT SERPL: 16.9 (ref 7–25)
CALCIUM SPEC-SCNC: 8.5 MG/DL (ref 8.6–10.5)
CHLORIDE SERPL-SCNC: 100 MMOL/L (ref 98–107)
CO2 SERPL-SCNC: 24.8 MMOL/L (ref 22–29)
CREAT BLD-MCNC: 0.65 MG/DL (ref 0.76–1.27)
DEPRECATED RDW RBC AUTO: 44.8 FL (ref 37–54)
ERYTHROCYTE [DISTWIDTH] IN BLOOD BY AUTOMATED COUNT: 12.1 % (ref 12.3–15.4)
GFR SERPL CREATININE-BSD FRML MDRD: 128 ML/MIN/1.73
GLUCOSE BLD-MCNC: 211 MG/DL (ref 65–99)
GLUCOSE BLDC GLUCOMTR-MCNC: 202 MG/DL (ref 70–130)
GLUCOSE BLDC GLUCOMTR-MCNC: 266 MG/DL (ref 70–130)
HCT VFR BLD AUTO: 45.2 % (ref 37.5–51)
HGB BLD-MCNC: 14.4 G/DL (ref 13–17.7)
MCH RBC QN AUTO: 32 PG (ref 26.6–33)
MCHC RBC AUTO-ENTMCNC: 31.9 G/DL (ref 31.5–35.7)
MCV RBC AUTO: 100.4 FL (ref 79–97)
PLATELET # BLD AUTO: 192 10*3/MM3 (ref 140–450)
PMV BLD AUTO: 9.7 FL (ref 6–12)
POTASSIUM BLD-SCNC: 4.1 MMOL/L (ref 3.5–5.2)
RBC # BLD AUTO: 4.5 10*6/MM3 (ref 4.14–5.8)
SODIUM BLD-SCNC: 136 MMOL/L (ref 136–145)
WBC NRBC COR # BLD: 9.2 10*3/MM3 (ref 3.4–10.8)

## 2019-03-14 PROCEDURE — A9270 NON-COVERED ITEM OR SERVICE: HCPCS | Performed by: HOSPITALIST

## 2019-03-14 PROCEDURE — 85027 COMPLETE CBC AUTOMATED: CPT | Performed by: UROLOGY

## 2019-03-14 PROCEDURE — 80048 BASIC METABOLIC PNL TOTAL CA: CPT | Performed by: UROLOGY

## 2019-03-14 PROCEDURE — 63710000001 GABAPENTIN 300 MG CAPSULE: Performed by: UROLOGY

## 2019-03-14 PROCEDURE — 25010000002 HYDROMORPHONE 1 MG/ML SOLUTION: Performed by: UROLOGY

## 2019-03-14 PROCEDURE — 63710000001 INSULIN LISPRO (HUMAN) PER 5 UNITS: Performed by: HOSPITALIST

## 2019-03-14 PROCEDURE — A9270 NON-COVERED ITEM OR SERVICE: HCPCS | Performed by: UROLOGY

## 2019-03-14 PROCEDURE — G0378 HOSPITAL OBSERVATION PER HR: HCPCS

## 2019-03-14 PROCEDURE — 63710000001 ROSUVASTATIN 5 MG TABLET: Performed by: UROLOGY

## 2019-03-14 PROCEDURE — 25010000002 VANCOMYCIN PER 500 MG: Performed by: UROLOGY

## 2019-03-14 PROCEDURE — 82962 GLUCOSE BLOOD TEST: CPT

## 2019-03-14 PROCEDURE — 63710000001 HYDROCODONE-ACETAMINOPHEN 7.5-325 MG TABLET: Performed by: UROLOGY

## 2019-03-14 PROCEDURE — 25010000002 GENTAMICIN PER 80 MG: Performed by: UROLOGY

## 2019-03-14 PROCEDURE — 63710000001 BENZTROPINE 1 MG TABLET: Performed by: UROLOGY

## 2019-03-14 PROCEDURE — 63710000001 METOPROLOL TARTRATE 25 MG TABLET: Performed by: HOSPITALIST

## 2019-03-14 RX ORDER — SENNA AND DOCUSATE SODIUM 50; 8.6 MG/1; MG/1
2 TABLET, FILM COATED ORAL NIGHTLY
Qty: 60 TABLET | Refills: 0 | Status: SHIPPED | OUTPATIENT
Start: 2019-03-14 | End: 2020-10-22

## 2019-03-14 RX ORDER — HYDROCODONE BITARTRATE AND ACETAMINOPHEN 7.5; 325 MG/1; MG/1
1 TABLET ORAL EVERY 6 HOURS PRN
Qty: 40 TABLET | Refills: 0 | Status: ON HOLD | OUTPATIENT
Start: 2019-03-14 | End: 2020-10-29 | Stop reason: SDUPTHER

## 2019-03-14 RX ORDER — LEVOFLOXACIN 750 MG/1
750 TABLET ORAL DAILY
Qty: 20 TABLET | Refills: 0 | Status: SHIPPED | OUTPATIENT
Start: 2019-03-14 | End: 2019-04-03

## 2019-03-14 RX ADMIN — GENTAMICIN SULFATE 120 MG: 60 INJECTION, SOLUTION INTRAVENOUS at 15:39

## 2019-03-14 RX ADMIN — HYDROMORPHONE HYDROCHLORIDE 1 MG: 1 INJECTION, SOLUTION INTRAMUSCULAR; INTRAVENOUS; SUBCUTANEOUS at 06:35

## 2019-03-14 RX ADMIN — HYDROMORPHONE HYDROCHLORIDE 1 MG: 1 INJECTION, SOLUTION INTRAMUSCULAR; INTRAVENOUS; SUBCUTANEOUS at 00:43

## 2019-03-14 RX ADMIN — GABAPENTIN 300 MG: 300 CAPSULE ORAL at 08:01

## 2019-03-14 RX ADMIN — METOPROLOL TARTRATE 25 MG: 25 TABLET ORAL at 08:01

## 2019-03-14 RX ADMIN — GENTAMICIN SULFATE 120 MG: 60 INJECTION, SOLUTION INTRAVENOUS at 00:40

## 2019-03-14 RX ADMIN — VANCOMYCIN HYDROCHLORIDE 1000 MG: 1 INJECTION, SOLUTION INTRAVENOUS at 13:53

## 2019-03-14 RX ADMIN — HYDROCODONE BITARTRATE AND ACETAMINOPHEN 1 TABLET: 7.5; 325 TABLET ORAL at 04:07

## 2019-03-14 RX ADMIN — GABAPENTIN 300 MG: 300 CAPSULE ORAL at 16:16

## 2019-03-14 RX ADMIN — HYDROCODONE BITARTRATE AND ACETAMINOPHEN 1 TABLET: 7.5; 325 TABLET ORAL at 10:10

## 2019-03-14 RX ADMIN — HYDROMORPHONE HYDROCHLORIDE 1 MG: 1 INJECTION, SOLUTION INTRAMUSCULAR; INTRAVENOUS; SUBCUTANEOUS at 15:39

## 2019-03-14 RX ADMIN — INSULIN LISPRO 4 UNITS: 100 INJECTION, SOLUTION INTRAVENOUS; SUBCUTANEOUS at 08:01

## 2019-03-14 RX ADMIN — GENTAMICIN SULFATE 120 MG: 60 INJECTION, SOLUTION INTRAVENOUS at 08:02

## 2019-03-14 RX ADMIN — HYDROCODONE BITARTRATE AND ACETAMINOPHEN 1 TABLET: 7.5; 325 TABLET ORAL at 16:16

## 2019-03-14 RX ADMIN — INSULIN LISPRO 6 UNITS: 100 INJECTION, SOLUTION INTRAVENOUS; SUBCUTANEOUS at 11:40

## 2019-03-14 RX ADMIN — BENZTROPINE MESYLATE 1 MG: 1 TABLET ORAL at 08:02

## 2019-03-14 RX ADMIN — ROSUVASTATIN CALCIUM 5 MG: 5 TABLET, FILM COATED ORAL at 08:01

## 2019-03-14 RX ADMIN — HYDROMORPHONE HYDROCHLORIDE 1 MG: 1 INJECTION, SOLUTION INTRAMUSCULAR; INTRAVENOUS; SUBCUTANEOUS at 11:40

## 2019-03-14 NOTE — PLAN OF CARE
Problem: Patient Care Overview  Goal: Plan of Care Review  Outcome: Ongoing (interventions implemented as appropriate)   03/14/19 9641   Coping/Psychosocial   Plan of Care Reviewed With patient   Plan of Care Review   Progress improving   OTHER   Outcome Summary emesis x1, zofran administered; incision to scrotal area is CDI with edema, patient refused ice pack; pain controlled with IV Dilaudid/PO Norco; ACHS; metoprolol initiated for BP control; FC DC at 0500, DTV 1100       Problem: Pain, Acute (Adult)  Goal: Acceptable Pain Control/Comfort Level  Outcome: Ongoing (interventions implemented as appropriate)

## 2019-03-14 NOTE — PLAN OF CARE
Problem: Patient Care Overview  Goal: Plan of Care Review  Outcome: Ongoing (interventions implemented as appropriate)   03/14/19 9421   Coping/Psychosocial   Plan of Care Reviewed With patient   Plan of Care Review   Progress improving   OTHER   Outcome Summary PT has had a good day. Scrotum remains swollen, red, and painful. Ambulated hallway with standby assist. Given PRN pain meds whenever available. Given insulin sliding scale.

## 2019-03-14 NOTE — CONSULTS
CONSULT NOTE    INTERNAL MEDICINE   [facility]       Patient Identification:  Name: Richard Kim  Age: 53 y.o.  Sex: male  :  1965  MRN: [id number]             Date of Consultation: 3/13/2019      Primary Care Physician: System, Provider Not In                               Requesting Physician: Dr. Campos Hopkins  Reason for Consultation:    Chief Complaint: Diabetes    History of Present Illness:   Mr Kim is a pleasant 53-year-old male who has past medical history of bipolar disorder, COPD, diabetes but denies any previous issues with hypertension.  He is postoperative day 0 from a penile prosthetic placement and is admitted to the urological service under the care of Dr. Campos Hopkins.  A was consulted for diabetic management.  This patient normally takes Invokana at home and is otherwise non-insulin-dependent.  Blood sugars here have ranged from 252 up to 334.  He did have some postoperative nausea and claims that he was near emesis but he feels that resolved at this juncture but has not tolerated any plates of food just yet but has kept down fluids.  He claims he is breathing just fine and denies any chest pain palpitations or productive cough.  He is in some pain at this juncture postoperatively and his blood pressures have been quite high running in the 180-190 systolic range with diastolics running in the high 90 to low 100 range.  His heart rate is also been running in the upper 90s to low 100 range and EKG confirms sinus tachycardia.  His O2 saturations are doing fine after review of the continuous pulse oximetry though the patient does have a past history of obstructive sleep apnea but is noncompliant with CPAP.        Past Medical History:  Past Medical History:   Diagnosis Date   • Anxiety and depression    • Asthma    • Bipolar disorder (CMS/Formerly [facility])    • COPD (chronic obstructive pulmonary disease) (CMS/Formerly [facility])    • Diabetes mellitus (CMS/Formerly [facility])    • Erectile  dysfunction    • Low back pain    • Sleep apnea     DOESN'T USE MACHINE     Past Surgical History:  Past Surgical History:   Procedure Laterality Date   • NO PAST SURGERIES     • SINUS SURGERY        Home Meds:  Medications Prior to Admission   Medication Sig Dispense Refill Last Dose   • albuterol sulfate  (90 Base) MCG/ACT inhaler Inhale 2 puffs Every 4 (Four) Hours As Needed for Wheezing.   3/12/2019 at 2100   • benztropine (COGENTIN) 1 MG tablet Take 1 mg by mouth 2 (Two) Times a Day.   3/11/2019   • Canagliflozin (INVOKANA) 300 MG tablet Take 300 mg by mouth Daily.   3/10/2019   • Cariprazine HCl (VRAYLAR) 3 MG capsule Take 3 mg by mouth Every Night.   3/10/2019   • diazePAM (VALIUM) 5 MG tablet Take 5 mg by mouth 2 (Two) Times a Day As Needed for Anxiety.   3/10/2019   • Dulaglutide (TRULICITY) 1.5 MG/0.5ML solution pen-injector Inject 1.5 mg under the skin into the appropriate area as directed 1 (One) Time Per Week. MONDAYS   3/11/2019   • gabapentin (NEURONTIN) 300 MG capsule Take 300 mg by mouth 3 (Three) Times a Day.   3/10/2019   • HYDROcodone-acetaminophen (NORCO) 7.5-325 MG per tablet Take 1 tablet by mouth Every 6 (Six) Hours As Needed for Moderate Pain .   3/10/2019   • montelukast (SINGULAIR) 10 MG tablet Take 10 mg by mouth Every Night.   3/10/2019   • OXcarbazepine (TRILEPTAL) 300 MG tablet Take 300 mg by mouth Every Night.   3/10/2019   • OXcarbazepine  MG tablet sustained-release 24 hour Take 600 mg by mouth 2 (Two) Times a Day.   3/10/2019   • QUEtiapine (SEROquel) 100 MG tablet Take 100 mg by mouth Every Night.   3/10/2019   • rosuvastatin (CRESTOR) 5 MG tablet Take 5 mg by mouth Daily.   3/10/2019   • tiZANidine (ZANAFLEX) 4 MG tablet Take 4 mg by mouth 3 (Three) Times a Day As Needed for Muscle Spasms.   3/10/2019   • zolpidem (AMBIEN) 10 MG tablet Take 10 mg by mouth At Night As Needed for Sleep.   3/10/2019     Current Meds:     Current Facility-Administered Medications:   •   albuterol (PROVENTIL) nebulizer solution 0.083% 2.5 mg/3mL, 2.5 mg, Nebulization, Q4H PRN, Campos Hopkins MD  •  benztropine (COGENTIN) tablet 1 mg, 1 mg, Oral, Q12H, Campos Hopkins MD  •  Cariprazine HCl (VRAYLAR) capsule capsule 3 mg, 3 mg, Oral, Nightly, Campos Hopkins MD  •  dextrose (D50W) 25 g/ 50mL Intravenous Solution 25 g, 25 g, Intravenous, Q15 Min PRN, Jame Vargas MD  •  dextrose (GLUTOSE) oral gel 15 g, 15 g, Oral, Q15 Min PRN, Jame Vargas MD  •  diazePAM (VALIUM) tablet 5 mg, 5 mg, Oral, BID PRN, Campos Hopkins MD  •  fluconazole (DIFLUCAN) IVPB 400 mg, 400 mg, Intravenous, Daily, Campos Hopkins MD  •  gabapentin (NEURONTIN) capsule 300 mg, 300 mg, Oral, TID, Campos Hopkins MD  •  [START ON 3/14/2019] gentamicin (GARAMYCIN) IVPB 120 mg, 120 mg, Intravenous, Q8H, Campos Hopkins MD  •  glucagon (human recombinant) (GLUCAGEN DIAGNOSTIC) injection 1 mg, 1 mg, Subcutaneous, PRN, Jame Vargas MD  •  HYDROcodone-acetaminophen (NORCO) 7.5-325 MG per tablet 1 tablet, 1 tablet, Oral, Q6H PRN, Campos Hopkins MD  •  HYDROmorphone (DILAUDID) injection 1 mg, 1 mg, Intravenous, Q4H PRN, 1 mg at 03/13/19 1945 **AND** naloxone (NARCAN) injection 0.1 mg, 0.1 mg, Intravenous, Q5 Min PRN, Campos Hopkins MD  •  insulin lispro (humaLOG) injection 0-9 Units, 0-9 Units, Subcutaneous, 4x Daily With Meals & Nightly, Jame Vargas MD  •  metoprolol tartrate (LOPRESSOR) tablet 25 mg, 25 mg, Oral, Q12H, Jame Vargas MD  •  montelukast (SINGULAIR) tablet 10 mg, 10 mg, Oral, Nightly, Campos Hopkins MD  •  ondansetron (ZOFRAN) tablet 4 mg, 4 mg, Oral, Q6H PRN **OR** ondansetron ODT (ZOFRAN-ODT) disintegrating tablet 4 mg, 4 mg, Oral, Q6H PRN **OR** ondansetron (ZOFRAN) injection 4 mg, 4 mg, Intravenous, Q6H PRN, Campos Hopkins MD, 4 mg at 03/13/19 2037  •  OXcarbazepine (TRILEPTAL) tablet 300 mg, 300 mg, Oral, Nightly,  Campos Hopkins MD  •  QUEtiapine (SEROquel) tablet 100 mg, 100 mg, Oral, Nightly, Campos Hopkins MD  •  rosuvastatin (CRESTOR) tablet 5 mg, 5 mg, Oral, Daily, Campos Hopkins MD  •  sennosides-docusate sodium (SENOKOT-S) 8.6-50 MG tablet 2 tablet, 2 tablet, Oral, Nightly, Campos Hopkins MD  •  sodium chloride 0.9 % infusion, 100 mL/hr, Intravenous, Continuous, Campos Hopkins MD, Last Rate: 100 mL/hr at 03/13/19 1945, 100 mL/hr at 03/13/19 1945  •  tiZANidine (ZANAFLEX) tablet 4 mg, 4 mg, Oral, TID PRN, Campos Hopkins MD  •  [START ON 3/14/2019] vancomycin (VANCOCIN) in iso-osmotic dextrose IVPB 1 g (premix) 200 mL, 1,000 mg, Intravenous, Q24H, Campos Hopkins MD  •  zolpidem (AMBIEN) tablet 10 mg, 10 mg, Oral, Nightly PRN, Campos Hopkins MD  Allergies:  Allergies   Allergen Reactions   • Aspirin Anaphylaxis   • Banana Anaphylaxis   • Penicillins Anaphylaxis     THROAT SWELLING     Social History:   Social History     Socioeconomic History   • Marital status:      Spouse name: Not on file   • Number of children: Not on file   • Years of education: Not on file   • Highest education level: Not on file   Social Needs   • Financial resource strain: Not on file   • Food insecurity - worry: Not on file   • Food insecurity - inability: Not on file   • Transportation needs - medical: Not on file   • Transportation needs - non-medical: Not on file   Occupational History   • Not on file   Tobacco Use   • Smoking status: Never Smoker   • Smokeless tobacco: Never Used   Substance and Sexual Activity   • Alcohol use: Yes     Alcohol/week: 3.6 oz     Types: 6 Cans of beer per week   • Drug use: No   • Sexual activity: Defer   Other Topics Concern   • Not on file   Social History Narrative   • Not on file     Family History:  Family History   Problem Relation Age of Onset   • Malig Hyperthermia Neg Hx           Review of Systems  See history of present illness and past  "medical history.  Patient denies any focal changes to vision smell taste or sound.  Emesis postoperative nausea without emesis.  Denies abdominal pain diarrhea fever chills night sweats confusion chest pain palpitations cough shortness of breath.  Preoperatively denies any issues with bruising or bleeding.  Denies any focal loss of function.  Remainder of ROS is negative.      Vitals:   /94 (BP Location: Left arm, Patient Position: Lying)   Pulse 94   Temp 97.3 °F (36.3 °C) (Oral)   Resp 14   Ht 175.3 cm (69.02\")   Wt 97.1 kg (214 lb 2 oz)   SpO2 96%   BMI 31.61 kg/m²   I/O:     Intake/Output Summary (Last 24 hours) at 3/13/2019 2134  Last data filed at 3/13/2019 1839  Gross per 24 hour   Intake 1800 ml   Output 550 ml   Net 1250 ml     Exam:  General Appearance:    Alert, cooperative, no distress, Aox3, no fm at bs, interactive and conversational   Head:    Normocephalic, without obvious abnormality, atraumatic   Eyes:    PERRL, conjunctiva/corneas clear, EOM's intact, both eyes   Ears:    Normal external ear canals, both ears   Nose:   Nares normal, septum midline, mucosa normal, no drainage    or sinus tenderness   Throat:   Lips, tongue, gums normal; oral mucosa pink and moist   Neck:   Supple, no JVD       Lungs:     Clear to auscultation bilaterally, respirations unlabored   Chest Wall:    No tenderness or deformity    Heart:    Regular rate and rhythm, S1 and S2 normal, no murmur, rub   or gallop   Abdomen:     Soft, non-tender, bowel sounds active all four quadrants,     no masses, deferred genitourinary exam   Extremities:  Moving all, no cyanosis or edema   Pulses:   Pulses palpable in all extremities; symmetric all extremities       Neurologic:   CNII-XII intact, no focal deficits noted       Data Review:  Labs in chart were reviewed.            Imaging Results (last 7 days)     ** No results found for the last 168 hours. **            Assessment:  Active Hospital Problems    Diagnosis  POA "   • Erectile dysfunction due to arterial insufficiency [N52.01]  Yes      Resolved Hospital Problems   No resolved problems to display.       Plan:  DM2 with hyperglycemia otherwise controlled with oral medications and non-insulin-dependent   -ssi for tonight -resume Invokana post discharge    COPD w/out AE    Elevated BP/Tachycardia -most likely all secondary to pain but given quite elevated blood pressures as well as heart rates well above 100 will place on metoprolol tartrate 25 mg with parameters twice daily.  Further recommendations will follow as to whether or not this medication will need to be continued more long-term as opposed to just postoperatively    JOVANI - NC w/ CPAP -agree with continuous pulse ox at bedside    Bipolar d/o -seems stable -continue medications    POD0 penile prosthesis placement    -SCDs for DVT prophylaxis    Thank you much for the consult.  [facility] will continue to follow give medical recommendations accordingly while inpatient    Jame Vargas MD   3/13/2019  9:34 PM

## 2019-03-16 NOTE — DISCHARGE SUMMARY
Date of Discharge:  03/14/2019    Discharge Diagnosis: Organic erectile dysfunction    Presenting Problem/History of Present Illness  Erectile dysfunction due to arterial insufficiency [N52.01]     33-year-old gentleman with end-stage erectile dysfunction failing conservative management with medications now presents for implantation penile prosthesis  Hospital Course  Patient is a 53 y.o. male presented with organic erectile dysfunction for penile implant.  He underwent a implantation of a 23 cm BX AMS penile prosthesis.  His postoperative course unremarkable.  He is kept around for observation and 24 hours of IV antibiotics with gentamicin Diflucan and vancomycin.  He did quite well.  His catheter was removed.  He is able to void.  He was discharged the following day.  Internal medicine consultation was achieved for management of his diabetes..      Procedures Performed  Procedure(s):  PENILE PROSTHESIS PLACEMENT       Consults:   Consults     Date and Time Order Name Status Description    3/13/2019 1859 Inpatient Internal Medicine Consult Completed           Pertinent Test Results: labs: Routine      Condition on Discharge: Excellent    Vital Signs       Physical Exam:   Penile ecchymoses moderate scrotal swelling with no hematoma noted    Discharge Disposition  Home or Self Care    Discharge Medications     Discharge Medications      New Medications      Instructions Start Date   levoFLOXacin 750 MG tablet  Commonly known as:  LEVAQUIN   750 mg, Oral, Daily      sennosides-docusate sodium 8.6-50 MG tablet  Commonly known as:  SENOKOT-S   2 tablets, Oral, Nightly         Continue These Medications      Instructions Start Date   albuterol sulfate  (90 Base) MCG/ACT inhaler  Commonly known as:  PROVENTIL HFA;VENTOLIN HFA;PROAIR HFA   2 puffs, Inhalation, Every 4 Hours PRN      AMBIEN 10 MG tablet  Generic drug:  zolpidem   10 mg, Oral, Nightly PRN      benztropine 1 MG tablet  Commonly known as:  COGENTIN    1 mg, Oral, 2 Times Daily      diazePAM 5 MG tablet  Commonly known as:  VALIUM   5 mg, Oral, 2 Times Daily PRN      gabapentin 300 MG capsule  Commonly known as:  NEURONTIN   300 mg, Oral, 3 Times Daily      HYDROcodone-acetaminophen 7.5-325 MG per tablet  Commonly known as:  NORCO   1 tablet, Oral, Every 6 Hours PRN      INVOKANA 300 MG tablet  Generic drug:  Canagliflozin   300 mg, Oral, Daily      montelukast 10 MG tablet  Commonly known as:  SINGULAIR   10 mg, Oral, Nightly      OXcarbazepine 300 MG tablet  Commonly known as:  TRILEPTAL   300 mg, Oral, Nightly      OXcarbazepine  MG tablet sustained-release 24 hour   600 mg, Oral, 2 Times Daily      QUEtiapine 100 MG tablet  Commonly known as:  SEROquel   100 mg, Oral, Nightly      rosuvastatin 5 MG tablet  Commonly known as:  CRESTOR   5 mg, Oral, Daily      tiZANidine 4 MG tablet  Commonly known as:  ZANAFLEX   4 mg, Oral, 3 Times Daily PRN      TRULICITY 1.5 MG/0.5ML solution pen-injector  Generic drug:  Dulaglutide   1.5 mg, Subcutaneous, Weekly, MONDAYS       VRAYLAR 3 MG capsule  Generic drug:  Cariprazine HCl   3 mg, Oral, Nightly             Discharge Diet: As tolerated    Activity at Discharge: Outlined to the patient    Follow-up Appointments  No future appointments.  Additional Instructions for the Follow-ups that You Need to Schedule     Discharge Follow-up with Specified Provider: ed nj; 2 Weeks   As directed      To:  ed nj    Follow Up:  2 Weeks               Test Results Pending at Discharge       Campos Nj MD  03/16/19  8:34 AM    Time: Discharge 30 min

## 2019-03-18 ENCOUNTER — NURSE TRIAGE (OUTPATIENT)
Dept: CALL CENTER | Facility: HOSPITAL | Age: 54
End: 2019-03-18

## 2019-03-18 NOTE — TELEPHONE ENCOUNTER
"Voicemail message left.     Reason for Disposition  • Message left on identified answering machine.    Additional Information  • Negative: [1] Caller is not with the adult (patient) AND [2] reporting urgent symptoms  • Negative: Lab result questions  • Negative: Medication questions  • Caller cannot be reached by phone  • Negative: Caller is angry or rude (e.g., hangs up, verbally abusive, yelling)  • Negative: Caller hangs up  • Negative: Caller has already spoken with the PCP and has no further questions.  • Negative: Caller has already spoken with another triager and has no further questions.  • Negative: Caller has already spoken with another triager or PCP AND has further questions AND triager able to answer questions.  • Negative: Busy signal.  First attempt to contact caller.  Follow-up call scheduled within 15 minutes.  • Negative: No answer.  First attempt to contact caller.  Follow-up call scheduled within 15 minutes.    Answer Assessment - Initial Assessment Questions  1. REASON FOR CALL or QUESTION: \"What is your reason for calling today?\" or \"How can I best help you?\" or \"What question do you have that I can help answer?\"      Question following procedure    Protocols used: NO CONTACT OR DUPLICATE CONTACT CALL-ADULT-, INFORMATION ONLY CALL-ADULT-      "

## 2020-10-19 ENCOUNTER — TRANSCRIBE ORDERS (OUTPATIENT)
Dept: PREADMISSION TESTING | Facility: HOSPITAL | Age: 55
End: 2020-10-19

## 2020-10-19 DIAGNOSIS — Z01.818 OTHER SPECIFIED PRE-OPERATIVE EXAMINATION: Primary | ICD-10-CM

## 2020-10-22 ENCOUNTER — APPOINTMENT (OUTPATIENT)
Dept: PREADMISSION TESTING | Facility: HOSPITAL | Age: 55
End: 2020-10-22

## 2020-10-22 VITALS
OXYGEN SATURATION: 100 % | HEIGHT: 69 IN | WEIGHT: 209 LBS | SYSTOLIC BLOOD PRESSURE: 174 MMHG | DIASTOLIC BLOOD PRESSURE: 111 MMHG | TEMPERATURE: 98.8 F | BODY MASS INDEX: 30.96 KG/M2 | RESPIRATION RATE: 18 BRPM | HEART RATE: 129 BPM

## 2020-10-22 LAB
ANION GAP SERPL CALCULATED.3IONS-SCNC: 19.1 MMOL/L (ref 5–15)
BUN SERPL-MCNC: 10 MG/DL (ref 6–20)
BUN/CREAT SERPL: 9.9 (ref 7–25)
CALCIUM SPEC-SCNC: 9.8 MG/DL (ref 8.6–10.5)
CHLORIDE SERPL-SCNC: 98 MMOL/L (ref 98–107)
CO2 SERPL-SCNC: 20.9 MMOL/L (ref 22–29)
CREAT SERPL-MCNC: 1.01 MG/DL (ref 0.76–1.27)
DEPRECATED RDW RBC AUTO: 41.8 FL (ref 37–54)
ERYTHROCYTE [DISTWIDTH] IN BLOOD BY AUTOMATED COUNT: 12.1 % (ref 12.3–15.4)
GFR SERPL CREATININE-BSD FRML MDRD: 76 ML/MIN/1.73
GLUCOSE SERPL-MCNC: 231 MG/DL (ref 65–99)
HCT VFR BLD AUTO: 55.4 % (ref 37.5–51)
HGB BLD-MCNC: 18.9 G/DL (ref 13–17.7)
MCH RBC QN AUTO: 32.1 PG (ref 26.6–33)
MCHC RBC AUTO-ENTMCNC: 34.1 G/DL (ref 31.5–35.7)
MCV RBC AUTO: 94.1 FL (ref 79–97)
PLATELET # BLD AUTO: 213 10*3/MM3 (ref 140–450)
PMV BLD AUTO: 9.4 FL (ref 6–12)
POTASSIUM SERPL-SCNC: 4.1 MMOL/L (ref 3.5–5.2)
RBC # BLD AUTO: 5.89 10*6/MM3 (ref 4.14–5.8)
SODIUM SERPL-SCNC: 138 MMOL/L (ref 136–145)
WBC # BLD AUTO: 9.29 10*3/MM3 (ref 3.4–10.8)

## 2020-10-22 PROCEDURE — 85027 COMPLETE CBC AUTOMATED: CPT | Performed by: UROLOGY

## 2020-10-22 PROCEDURE — 36415 COLL VENOUS BLD VENIPUNCTURE: CPT

## 2020-10-22 PROCEDURE — 93010 ELECTROCARDIOGRAM REPORT: CPT | Performed by: INTERNAL MEDICINE

## 2020-10-22 PROCEDURE — 93005 ELECTROCARDIOGRAM TRACING: CPT

## 2020-10-22 PROCEDURE — 80048 BASIC METABOLIC PNL TOTAL CA: CPT | Performed by: UROLOGY

## 2020-10-22 RX ORDER — METFORMIN HYDROCHLORIDE 500 MG/1
1000 TABLET, EXTENDED RELEASE ORAL 2 TIMES DAILY
COMMUNITY

## 2020-10-22 RX ORDER — AMITRIPTYLINE HYDROCHLORIDE 25 MG/1
25 TABLET, FILM COATED ORAL NIGHTLY
COMMUNITY

## 2020-10-22 RX ORDER — GLIMEPIRIDE 2 MG/1
2 TABLET ORAL
COMMUNITY

## 2020-10-22 RX ORDER — TRAZODONE HYDROCHLORIDE 50 MG/1
50 TABLET ORAL NIGHTLY
COMMUNITY

## 2020-10-22 RX ORDER — ALBUTEROL SULFATE 2.5 MG/3ML
2.5 SOLUTION RESPIRATORY (INHALATION) EVERY 4 HOURS
COMMUNITY

## 2020-10-22 NOTE — DISCHARGE INSTRUCTIONS
Take the following medications the morning of surgery with a small sip of water:  GABAPENTIN,HYDROCODONE,COGENTIN  BRING INNHALER    If you are on prescription narcotic pain medication to control your pain you may also take that medication the morning of surgery.    General Instructions: NOTHING 8 HOUR PRIOR TO ARRIVAL TIME  • Do not eat or drink anything after midnight the night before surgery.  • Infants may have breast milk up to four hours before surgery.  • Infants drinking formula may drink formula up to six hours before surgery.   • Patients who avoid smoking, chewing tobacco and alcohol for 4 weeks prior to surgery have a reduced risk of post-operative complications.  Quit smoking as many days before surgery as you can.  • Do not smoke, use chewing tobacco or drink alcohol the day of surgery.   • If applicable bring your C-PAP/ BI-PAP machine.  • Bring any papers given to you in the doctor’s office.  • Wear clean comfortable clothes.  • Do not wear contact lenses, false eyelashes or make-up.  Bring a case for your glasses.   • Bring crutches or walker if applicable.  • Remove all piercings.  Leave jewelry and any other valuables at home.  • Hair extensions with metal clips must be removed prior to surgery.  • The Pre-Admission Testing nurse will instruct you to bring medications if unable to obtain an accurate list in Pre-Admission Testing.        If you were given a blood bank ID arm band remember to bring it with you the day of surgery.    Preventing a Surgical Site Infection:  • For 2 to 3 days before surgery, avoid shaving with a razor because the razor can irritate skin and make it easier to develop an infection.    • Any areas of open skin can increase the risk of a post-operative wound infection by allowing bacteria to enter and travel throughout the body.  Notify your surgeon if you have any skin wounds / rashes even if it is not near the expected surgical site.  The area will need assessed to  determine if surgery should be delayed until it is healed.  • The night prior to surgery shower using a fresh bar of anti-bacterial soap (such as Dial) and clean washcloth.  Sleep in a clean bed with clean clothing.  Do not allow pets to sleep with you.  • Shower on the morning of surgery using a fresh bar of anti-bacterial soap (such as Dial) and clean washcloth.  Dry with a clean towel and dress in clean clothing.  • Ask your surgeon if you will be receiving antibiotics prior to surgery.  • Make sure you, your family, and all healthcare providers clean their hands with soap and water or an alcohol based hand  before caring for you or your wound.    Day of surgery:10/29/2020 ARRIVAL TIME 1:30 PM  Your arrival time is approximately two hours before your scheduled surgery time.  Upon arrival, a Pre-op nurse and Anesthesiologist will review your health history, obtain vital signs, and answer questions you may have.  The only belongings needed at this time will be your home medications and if applicable your C-PAP/BI-PAP machine.  If you are staying overnight your family can leave the rest of your belongings in the car and bring them to your room later.  A Pre-op nurse will start an IV and you may receive medication in preparation for surgery, including something to help you relax.  While you are in surgery your family should notify the waiting room  if they leave the waiting room area and provide a contact phone number.    Please be aware that surgery does come with discomfort.  We want to make every effort to control your discomfort so please discuss any uncontrolled symptoms with your nurse.   Your doctor will most likely have prescribed pain medications.      If you are going home after surgery you will receive individualized written care instructions before being discharged.  A responsible adult must drive you to and from the hospital on the day of your surgery and stay with you for 24  hours.    If you are staying overnight following surgery, you will be transported to your hospital room following the recovery period.  [facility] has all private rooms.    If you have any questions please call Pre-Admission Testing at [phone].  Deductibles and co-payments are collected on the day of service. Please be prepared to pay the required co-pay, deductible or deposit on the day of service as defined by your plan.    Patient Education for Self-Quarantine Process    Following your COVID testing, we strongly recommend that you do not leave your home after you have been tested for COVID except to get medical care. This includes not going to work, school or to public areas.  If this is not possible for you to do please limit your activities to only required outings.  Be sure to wear a mask when you are with other people, practice social distancing and wash your hands frequently.      The following items provide additional details to keep you safe.  • Wash your hands with soap and water frequently for at least 20 seconds.   • Avoid touching your eyes, nose and mouth with unwashed hands.  • Do not share anything - utensils, towels, food from the same bowl.   • Have your own utensils, drinking glass, dishes, towels and bedding.   • Do not have visitors.   • Do use FaceTime to stay in touch with family and friends.  • You should stay in a specific room away from others if possible.   • Stay at least 6 feet away from others in the home if you cannot have a dedicated room to yourself.   • Do not snuggle with your pet. While the CDC says there is no evidence that pets can spread COVID-19 or be infected from humans, it is probably best to avoid “petting, snuggling, being kissed or licked and sharing food (during self-quarantine)”, according to the CDC.   • Sanitize household surfaces daily. Include all high touch areas (door handles, light switches, phones, countertops, etc.)  • Do not share a  bathroom with others, if possible.   • Wear a mask around others in your home if you are unable to stay in a separate room or 6 feet apart. If  you are unable to wear a mask, have your family member wear a mask if they must be within 6 feet of you.   Call your surgeon immediately if you experience any of the following symptoms:  • Sore Throat  • Shortness of Breath or difficulty breathing  • Cough  • Chills  • Body soreness or muscle pain  • Headache  • Fever  • New loss of taste or smell  • Do not arrive for your surgery ill.  Your procedure will need to be rescheduled to another time.  You will need to call your physician before the day of surgery to avoid any unnecessary exposure to hospital staff as well as other patients.

## 2020-10-27 ENCOUNTER — OFFICE VISIT (OUTPATIENT)
Dept: CARDIOLOGY | Facility: CLINIC | Age: 55
End: 2020-10-27

## 2020-10-27 ENCOUNTER — LAB (OUTPATIENT)
Dept: LAB | Facility: HOSPITAL | Age: 55
End: 2020-10-27

## 2020-10-27 VITALS
HEART RATE: 122 BPM | HEIGHT: 69 IN | SYSTOLIC BLOOD PRESSURE: 134 MMHG | DIASTOLIC BLOOD PRESSURE: 86 MMHG | RESPIRATION RATE: 20 BRPM | OXYGEN SATURATION: 98 % | BODY MASS INDEX: 31.7 KG/M2 | WEIGHT: 214 LBS

## 2020-10-27 DIAGNOSIS — R06.09 DYSPNEA ON EXERTION: Primary | ICD-10-CM

## 2020-10-27 DIAGNOSIS — Z01.818 OTHER SPECIFIED PRE-OPERATIVE EXAMINATION: ICD-10-CM

## 2020-10-27 PROCEDURE — U0004 COV-19 TEST NON-CDC HGH THRU: HCPCS | Performed by: INTERNAL MEDICINE

## 2020-10-27 PROCEDURE — 99204 OFFICE O/P NEW MOD 45 MIN: CPT | Performed by: INTERNAL MEDICINE

## 2020-10-27 PROCEDURE — 93000 ELECTROCARDIOGRAM COMPLETE: CPT | Performed by: INTERNAL MEDICINE

## 2020-10-27 PROCEDURE — C9803 HOPD COVID-19 SPEC COLLECT: HCPCS

## 2020-10-27 NOTE — PROGRESS NOTES
[facility]      Patient Name: Richard Kim  :1965  Age: 55 y.o.  Encounter Provider:  Puma Gibson Jr, MD      Chief Complaint:   Chief Complaint   Patient presents with   • Surgical Clearance         HPI  Richard Kim is a 55 y.o. male with past medical history of diabetes, dyslipidemia and asthma who presents for initial evaluation.  He was seen by Dr. Stephens in  for abnormal stress study.  Given chest pain he was taken to the Cath Lab and per patient report had no disease requiring intervention.  He denies chest pain with activity.  He has stated allergy to aspirin and is on rosuvastatin for many years.  No recent lipid profile in the system.  He states that his activity is somewhat limited by shortness of breath which has been an ongoing problem for the last 5 to 10 years.  He states that he has asthma and has to use his inhaler often.  He is not been seen by pulmonary in the past.  He denies any orthopnea or edema but states that he occasionally awakes to shortness of breath and requires the use of his inhalers.  Blood pressures well controlled today but he is tachycardic at baseline.  He denies any palpitations, dizziness or syncope.  He is a lifelong non-smoker who drinks 5-6 beers daily and denies illicit drug use.  No family history of premature coronary artery disease or sudden cardiac death.      The following portions of the patient's history were reviewed and updated as appropriate: allergies, current medications, past family history, past medical history, past social history, past surgical history and problem list.      Review of Systems   Constitution: Negative for chills and fever.   HENT: Negative for hoarse voice and sore throat.    Eyes: Negative for double vision and photophobia.   Cardiovascular: Positive for dyspnea on exertion and paroxysmal nocturnal dyspnea. Negative for chest pain, leg swelling, near-syncope, orthopnea, palpitations and  "syncope.   Respiratory: Positive for shortness of breath. Negative for cough and wheezing.    Skin: Negative for poor wound healing and rash.   Musculoskeletal: Negative for arthritis and joint swelling.   Gastrointestinal: Negative for bloating, abdominal pain, hematemesis and hematochezia.   Neurological: Negative for dizziness and focal weakness.   Psychiatric/Behavioral: Negative for depression and suicidal ideas.       OBJECTIVE:   Vital Signs  Vitals:    10/27/20 0915   BP: 134/86   Pulse: (!) 122   Resp: 20   SpO2: 98%     Estimated body mass index is 31.6 kg/m² as calculated from the following:    Height as of this encounter: 175.3 cm (69\").    Weight as of this encounter: 97.1 kg (214 lb).    Vitals signs reviewed.   Constitutional:       Appearance: Healthy appearance. Not in distress.   Neck:      Vascular: No JVR. JVD normal.   Pulmonary:      Effort: Pulmonary effort is normal.      Breath sounds: Diffuse Wheezing present. No rhonchi. No rales.   Chest:      Chest wall: Not tender to palpatation.   Cardiovascular:      PMI at left midclavicular line. Tachycardia present. Regular rhythm.      Murmurs: There is no murmur.      No gallop. No click. No rub.   Pulses:     Intact distal pulses.   Edema:     Peripheral edema absent.   Abdominal:      General: Bowel sounds are normal.      Palpations: Abdomen is soft.      Tenderness: There is no abdominal tenderness.   Musculoskeletal: Normal range of motion.         General: No tenderness.   Skin:     General: Skin is warm and dry.   Neurological:      General: No focal deficit present.      Mental Status: Alert and oriented to person, place and time.           ECG 12 Lead    Date/Time: 10/27/2020 10:53 AM  Performed by: Puma Gibson Jr., MD  Authorized by: Puma Gibson Jr., MD   Comparison: compared with previous ECG from 10/22/2020  Similar to previous ECG  Rhythm: sinus tachycardia    Clinical impression: non-specific ECG                  ASSESSMENT: "     Dyspnea  Asthma  Alcohol abuse  Diabetes  Hypertension  Erectile dysfunction    PLAN OF CARE:     1. Preoperative risk assessment -patient has diffuse wheezing and using inhaler more often.  This is a bad time for seasonal allergies and asthma however he is never been officially worked up for is respiratory disease but we will check an echocardiogram to make sure this is not heart failure.  He has no other clinical signs of heart failure and is euvolemic today on exam.  If echocardiogram tomorrow morning is within normal range he is low risk for perioperative Mace low risk surgery such as penile pump implantation.  His respiratory status will need to be evaluated by anesthesia prior to surgery.  2. Alcohol abuse -monitor closely for signs of withdrawal in the perioperative timeframe.  3. Asthma -recommend referral to pulmonology.  4. Hypertension -borderline values today in clinic.  Check twice daily blood pressure log and send that to clinic after 1 week.  Sodium restricted diet is counseled.  5. Diabetes -defer to PCP for ongoing work-up and management.    Return to clinic 6 months.  We will request records from Dr. Stephens for cath in 2015.             Discharge Medications          Accurate as of October 27, 2020  9:16 AM. If you have any questions, ask your nurse or doctor.            Changes to Medications      Instructions Start Date   HYDROcodone-acetaminophen 7.5-325 MG per tablet  Commonly known as: NORCO  What changed: when to take this   1 tablet, Oral, Every 6 Hours PRN         Continue These Medications      Instructions Start Date   albuterol (2.5 MG/3ML) 0.083% nebulizer solution  Commonly known as: PROVENTIL   2.5 mg, Inhalation, Every 4 Hours      albuterol sulfate  (90 Base) MCG/ACT inhaler  Commonly known as: PROVENTIL HFA;VENTOLIN HFA;PROAIR HFA   2 puffs, Inhalation, Every 4 Hours PRN      amitriptyline 25 MG tablet  Commonly known as: ELAVIL   25 mg, Oral, Nightly, CAN TAKE 1-2  TAB       benztropine 1 MG tablet  Commonly known as: COGENTIN   1 mg, Oral, 2 Times Daily      gabapentin 300 MG capsule  Commonly known as: NEURONTIN   300 mg, Oral, 3 Times Daily      glimepiride 2 MG tablet  Commonly known as: AMARYL   2 mg, Oral, Every Morning Before Breakfast      Invokana 300 MG tablet  Generic drug: Canagliflozin   300 mg, Oral, Daily      metFORMIN  MG 24 hr tablet  Commonly known as: GLUCOPHAGE-XR   1,000 mg, Oral, 2 Times Daily      rosuvastatin 5 MG tablet  Commonly known as: CRESTOR   5 mg, Oral, Nightly      tiZANidine 4 MG tablet  Commonly known as: ZANAFLEX   4 mg, Oral, 3 Times Daily PRN      traZODone 50 MG tablet  Commonly known as: DESYREL   50 mg, Oral, Nightly      Trulicity 1.5 MG/0.5ML solution pen-injector  Generic drug: Dulaglutide   1.5 mg, Subcutaneous, Weekly, MONDAYS       Vraylar 3 MG capsule capsule  Generic drug: Cariprazine HCl   3 mg, Oral, Nightly             Thank you for allowing me to participate in the care of your patient,      Sincerely,   Puma Gibson MD   [facility]  10/27/20  09:16 EDT

## 2020-10-28 ENCOUNTER — HOSPITAL ENCOUNTER (OUTPATIENT)
Dept: CARDIOLOGY | Facility: HOSPITAL | Age: 55
Discharge: HOME | End: 2020-10-28
Admitting: INTERNAL MEDICINE

## 2020-10-28 VITALS
WEIGHT: 214 LBS | SYSTOLIC BLOOD PRESSURE: 134 MMHG | DIASTOLIC BLOOD PRESSURE: 86 MMHG | HEIGHT: 69 IN | BODY MASS INDEX: 31.7 KG/M2 | HEART RATE: 110 BPM

## 2020-10-28 DIAGNOSIS — R06.09 DYSPNEA ON EXERTION: ICD-10-CM

## 2020-10-28 LAB — SARS-COV-2 RNA RESP QL NAA+PROBE: NOT DETECTED

## 2020-10-28 PROCEDURE — 93306 TTE W/DOPPLER COMPLETE: CPT

## 2020-10-28 PROCEDURE — 93306 TTE W/DOPPLER COMPLETE: CPT | Performed by: INTERNAL MEDICINE

## 2020-10-28 NOTE — PROGRESS NOTES
Patient know echocardiogram is normal.  There is no cardiac reason that he should not have his surgery.

## 2020-10-29 ENCOUNTER — ANESTHESIA EVENT (OUTPATIENT)
Dept: PERIOP | Facility: HOSPITAL | Age: 55
End: 2020-10-29

## 2020-10-29 ENCOUNTER — ANESTHESIA (OUTPATIENT)
Dept: PERIOP | Facility: HOSPITAL | Age: 55
End: 2020-10-29

## 2020-10-29 ENCOUNTER — HOSPITAL ENCOUNTER (OUTPATIENT)
Facility: HOSPITAL | Age: 55
Setting detail: HOSPITAL OUTPATIENT SURGERY
Discharge: HOME | End: 2020-10-29
Attending: UROLOGY | Admitting: UROLOGY

## 2020-10-29 VITALS
RESPIRATION RATE: 20 BRPM | OXYGEN SATURATION: 97 % | HEART RATE: 96 BPM | SYSTOLIC BLOOD PRESSURE: 169 MMHG | HEIGHT: 69 IN | DIASTOLIC BLOOD PRESSURE: 99 MMHG | WEIGHT: 211.42 LBS | BODY MASS INDEX: 31.31 KG/M2 | TEMPERATURE: 98 F

## 2020-10-29 DIAGNOSIS — N52.01 ERECTILE DYSFUNCTION DUE TO ARTERIAL INSUFFICIENCY: ICD-10-CM

## 2020-10-29 DIAGNOSIS — T83.490A MALFUNCTION OF PENILE PROSTHESIS, INITIAL ENCOUNTER (HCC): Primary | ICD-10-CM

## 2020-10-29 PROBLEM — M79.18 MYOFASCIAL PAIN: Status: ACTIVE | Noted: 2018-11-02

## 2020-10-29 PROBLEM — M54.50 LBP (LOW BACK PAIN): Status: ACTIVE | Noted: 2020-10-29

## 2020-10-29 PROBLEM — M47.816 LUMBAR SPONDYLOSIS: Status: ACTIVE | Noted: 2020-01-23

## 2020-10-29 PROBLEM — M51.369 DEGENERATION OF LUMBAR INTERVERTEBRAL DISC: Status: ACTIVE | Noted: 2018-11-02

## 2020-10-29 PROBLEM — E11.9 DIABETES MELLITUS TYPE 2, NONINSULIN DEPENDENT: Status: ACTIVE | Noted: 2020-10-29

## 2020-10-29 PROBLEM — M51.36 DEGENERATION OF LUMBAR INTERVERTEBRAL DISC: Status: ACTIVE | Noted: 2018-11-02

## 2020-10-29 PROBLEM — E78.5 HLD (HYPERLIPIDEMIA): Status: ACTIVE | Noted: 2020-10-29

## 2020-10-29 LAB
AORTIC ARCH: 2.5 CM
ASCENDING AORTA: 3.2 CM
BH CV ECHO MEAS - ACS: 1.8 CM
BH CV ECHO MEAS - AO MAX PG (FULL): 1.7 MMHG
BH CV ECHO MEAS - AO MAX PG: 5.4 MMHG
BH CV ECHO MEAS - AO MEAN PG (FULL): 0.96 MMHG
BH CV ECHO MEAS - AO MEAN PG: 3 MMHG
BH CV ECHO MEAS - AO V2 MAX: 116.3 CM/SEC
BH CV ECHO MEAS - AO V2 MEAN: 75.1 CM/SEC
BH CV ECHO MEAS - AO V2 VTI: 19.8 CM
BH CV ECHO MEAS - ASC AORTA: 3.2 CM
BH CV ECHO MEAS - AVA(I,A): 3.3 CM^2
BH CV ECHO MEAS - AVA(I,D): 3.3 CM^2
BH CV ECHO MEAS - AVA(V,A): 2.9 CM^2
BH CV ECHO MEAS - AVA(V,D): 2.9 CM^2
BH CV ECHO MEAS - BSA(HAYCOCK): 2.2 M^2
BH CV ECHO MEAS - BSA: 2.1 M^2
BH CV ECHO MEAS - BZI_BMI: 31.6 KILOGRAMS/M^2
BH CV ECHO MEAS - BZI_METRIC_HEIGHT: 175.3 CM
BH CV ECHO MEAS - BZI_METRIC_WEIGHT: 97.1 KG
BH CV ECHO MEAS - EDV(MOD-SP2): 51 ML
BH CV ECHO MEAS - EDV(MOD-SP4): 56 ML
BH CV ECHO MEAS - EDV(TEICH): 111.3 ML
BH CV ECHO MEAS - EF(CUBED): 61.2 %
BH CV ECHO MEAS - EF(MOD-BP): 55 %
BH CV ECHO MEAS - EF(MOD-SP2): 54.9 %
BH CV ECHO MEAS - EF(MOD-SP4): 55.4 %
BH CV ECHO MEAS - EF(TEICH): 52.7 %
BH CV ECHO MEAS - ESV(MOD-SP2): 23 ML
BH CV ECHO MEAS - ESV(MOD-SP4): 25 ML
BH CV ECHO MEAS - ESV(TEICH): 52.7 ML
BH CV ECHO MEAS - FS: 27.1 %
BH CV ECHO MEAS - IVS/LVPW: 1.1
BH CV ECHO MEAS - IVSD: 1.3 CM
BH CV ECHO MEAS - LAT PEAK E' VEL: 11.6 CM/SEC
BH CV ECHO MEAS - LV DIASTOLIC VOL/BSA (35-75): 26.3 ML/M^2
BH CV ECHO MEAS - LV MASS(C)D: 227.5 GRAMS
BH CV ECHO MEAS - LV MASS(C)DI: 107 GRAMS/M^2
BH CV ECHO MEAS - LV MAX PG: 3.8 MMHG
BH CV ECHO MEAS - LV MEAN PG: 2 MMHG
BH CV ECHO MEAS - LV SYSTOLIC VOL/BSA (12-30): 11.8 ML/M^2
BH CV ECHO MEAS - LV V1 MAX: 97 CM/SEC
BH CV ECHO MEAS - LV V1 MEAN: 61.4 CM/SEC
BH CV ECHO MEAS - LV V1 VTI: 18.6 CM
BH CV ECHO MEAS - LVIDD: 4.9 CM
BH CV ECHO MEAS - LVIDS: 3.6 CM
BH CV ECHO MEAS - LVLD AP2: 7.7 CM
BH CV ECHO MEAS - LVLD AP4: 8 CM
BH CV ECHO MEAS - LVLS AP2: 6.9 CM
BH CV ECHO MEAS - LVLS AP4: 7.2 CM
BH CV ECHO MEAS - LVOT AREA (M): 3.5 CM^2
BH CV ECHO MEAS - LVOT AREA: 3.5 CM^2
BH CV ECHO MEAS - LVOT DIAM: 2.1 CM
BH CV ECHO MEAS - LVPWD: 1.2 CM
BH CV ECHO MEAS - MED PEAK E' VEL: 10.2 CM/SEC
BH CV ECHO MEAS - MV A MAX VEL: 93 CM/SEC
BH CV ECHO MEAS - MV DEC SLOPE: 254.1 CM/SEC^2
BH CV ECHO MEAS - MV DEC TIME: 0.21 SEC
BH CV ECHO MEAS - MV E MAX VEL: 57.8 CM/SEC
BH CV ECHO MEAS - MV E/A: 0.62
BH CV ECHO MEAS - MV MAX PG: 4.6 MMHG
BH CV ECHO MEAS - MV MEAN PG: 2 MMHG
BH CV ECHO MEAS - MV P1/2T MAX VEL: 59.1 CM/SEC
BH CV ECHO MEAS - MV P1/2T: 68.1 MSEC
BH CV ECHO MEAS - MV V2 MAX: 106.9 CM/SEC
BH CV ECHO MEAS - MV V2 MEAN: 66.6 CM/SEC
BH CV ECHO MEAS - MV V2 VTI: 17.1 CM
BH CV ECHO MEAS - MVA P1/2T LCG: 3.7 CM^2
BH CV ECHO MEAS - MVA(P1/2T): 3.2 CM^2
BH CV ECHO MEAS - MVA(VTI): 3.8 CM^2
BH CV ECHO MEAS - PA MAX PG (FULL): 0.21 MMHG
BH CV ECHO MEAS - PA MAX PG: 2.7 MMHG
BH CV ECHO MEAS - PA V2 MAX: 82.5 CM/SEC
BH CV ECHO MEAS - PULM A REVS DUR: 0.11 SEC
BH CV ECHO MEAS - PULM A REVS VEL: 37.3 CM/SEC
BH CV ECHO MEAS - PULM DIAS VEL: 41.1 CM/SEC
BH CV ECHO MEAS - PULM S/D: 1.5
BH CV ECHO MEAS - PULM SYS VEL: 62.1 CM/SEC
BH CV ECHO MEAS - PVA(V,A): 2.8 CM^2
BH CV ECHO MEAS - PVA(V,D): 2.8 CM^2
BH CV ECHO MEAS - QP/QS: 0.68
BH CV ECHO MEAS - RV MAX PG: 2.5 MMHG
BH CV ECHO MEAS - RV MEAN PG: 1.7 MMHG
BH CV ECHO MEAS - RV V1 MAX: 79.3 CM/SEC
BH CV ECHO MEAS - RV V1 MEAN: 60.8 CM/SEC
BH CV ECHO MEAS - RV V1 VTI: 15.4 CM
BH CV ECHO MEAS - RVOT AREA: 2.9 CM^2
BH CV ECHO MEAS - RVOT DIAM: 1.9 CM
BH CV ECHO MEAS - SI(CUBED): 33.3 ML/M^2
BH CV ECHO MEAS - SI(LVOT): 30.6 ML/M^2
BH CV ECHO MEAS - SI(MOD-SP2): 13.2 ML/M^2
BH CV ECHO MEAS - SI(MOD-SP4): 14.6 ML/M^2
BH CV ECHO MEAS - SI(TEICH): 27.6 ML/M^2
BH CV ECHO MEAS - SV(CUBED): 70.8 ML
BH CV ECHO MEAS - SV(LVOT): 65 ML
BH CV ECHO MEAS - SV(MOD-SP2): 28 ML
BH CV ECHO MEAS - SV(MOD-SP4): 31 ML
BH CV ECHO MEAS - SV(RVOT): 44.5 ML
BH CV ECHO MEAS - SV(TEICH): 58.6 ML
BH CV ECHO MEAS - TAPSE (>1.6): 2 CM
BH CV ECHO MEASUREMENTS AVERAGE E/E' RATIO: 5.3
BH CV XLRA - RV BASE: 2.4 CM
BH CV XLRA - RV LENGTH: 6.2 CM
BH CV XLRA - RV MID: 2 CM
BH CV XLRA - TDI S': 15.4 CM/SEC
GLUCOSE BLDC GLUCOMTR-MCNC: 157 MG/DL (ref 70–130)
GLUCOSE BLDC GLUCOMTR-MCNC: 161 MG/DL (ref 70–130)
LEFT ATRIUM VOLUME INDEX: 11 ML/M2
MAXIMAL PREDICTED HEART RATE: 163 BPM
SINUS: 2.9 CM
STJ: 2.6 CM
STRESS TARGET HR: 139 BPM

## 2020-10-29 PROCEDURE — 25010000002 PROPOFOL 10 MG/ML EMULSION: Performed by: ANESTHESIOLOGY

## 2020-10-29 PROCEDURE — 25010000002 GENTAMICIN PER 80 MG: Performed by: UROLOGY

## 2020-10-29 PROCEDURE — 25010000002 FENTANYL CITRATE (PF) 100 MCG/2ML SOLUTION: Performed by: ANESTHESIOLOGY

## 2020-10-29 PROCEDURE — 25010000002 MIDAZOLAM PER 1 MG: Performed by: ANESTHESIOLOGY

## 2020-10-29 PROCEDURE — 82962 GLUCOSE BLOOD TEST: CPT

## 2020-10-29 PROCEDURE — 25010000002 SUCCINYLCHOLINE PER 20 MG: Performed by: ANESTHESIOLOGY

## 2020-10-29 RX ORDER — DIPHENHYDRAMINE HYDROCHLORIDE 50 MG/ML
12.5 INJECTION INTRAMUSCULAR; INTRAVENOUS
Status: DISCONTINUED | OUTPATIENT
Start: 2020-10-29 | End: 2020-10-29 | Stop reason: HOSPADM

## 2020-10-29 RX ORDER — ONDANSETRON 2 MG/ML
4 INJECTION INTRAMUSCULAR; INTRAVENOUS ONCE AS NEEDED
Status: DISCONTINUED | OUTPATIENT
Start: 2020-10-29 | End: 2020-10-29 | Stop reason: HOSPADM

## 2020-10-29 RX ORDER — HYDROCODONE BITARTRATE AND ACETAMINOPHEN 7.5; 325 MG/1; MG/1
1 TABLET ORAL EVERY 6 HOURS PRN
Qty: 40 TABLET | Refills: 0 | Status: SHIPPED | OUTPATIENT
Start: 2020-10-29 | End: 2020-10-29 | Stop reason: SDUPTHER

## 2020-10-29 RX ORDER — DIPHENHYDRAMINE HCL 25 MG
25 CAPSULE ORAL
Status: DISCONTINUED | OUTPATIENT
Start: 2020-10-29 | End: 2020-10-29 | Stop reason: HOSPADM

## 2020-10-29 RX ORDER — FENTANYL CITRATE 50 UG/ML
50 INJECTION, SOLUTION INTRAMUSCULAR; INTRAVENOUS
Status: DISCONTINUED | OUTPATIENT
Start: 2020-10-29 | End: 2020-10-29 | Stop reason: HOSPADM

## 2020-10-29 RX ORDER — LABETALOL HYDROCHLORIDE 5 MG/ML
5 INJECTION, SOLUTION INTRAVENOUS
Status: DISCONTINUED | OUTPATIENT
Start: 2020-10-29 | End: 2020-10-29 | Stop reason: HOSPADM

## 2020-10-29 RX ORDER — BUPIVACAINE HYDROCHLORIDE 2.5 MG/ML
INJECTION, SOLUTION EPIDURAL; INFILTRATION; INTRACAUDAL AS NEEDED
Status: DISCONTINUED | OUTPATIENT
Start: 2020-10-29 | End: 2020-10-29 | Stop reason: HOSPADM

## 2020-10-29 RX ORDER — OXYCODONE AND ACETAMINOPHEN 7.5; 325 MG/1; MG/1
1 TABLET ORAL ONCE AS NEEDED
Status: DISCONTINUED | OUTPATIENT
Start: 2020-10-29 | End: 2020-10-29 | Stop reason: HOSPADM

## 2020-10-29 RX ORDER — LIDOCAINE HYDROCHLORIDE 20 MG/ML
INJECTION, SOLUTION INFILTRATION; PERINEURAL AS NEEDED
Status: DISCONTINUED | OUTPATIENT
Start: 2020-10-29 | End: 2020-10-29 | Stop reason: SURG

## 2020-10-29 RX ORDER — SODIUM CHLORIDE, SODIUM LACTATE, POTASSIUM CHLORIDE, CALCIUM CHLORIDE 600; 310; 30; 20 MG/100ML; MG/100ML; MG/100ML; MG/100ML
9 INJECTION, SOLUTION INTRAVENOUS CONTINUOUS
Status: DISCONTINUED | OUTPATIENT
Start: 2020-10-29 | End: 2020-10-29 | Stop reason: HOSPADM

## 2020-10-29 RX ORDER — FAMOTIDINE 10 MG/ML
20 INJECTION, SOLUTION INTRAVENOUS ONCE
Status: COMPLETED | OUTPATIENT
Start: 2020-10-29 | End: 2020-10-29

## 2020-10-29 RX ORDER — GENTAMICIN SULFATE 60 MG/50ML
120 INJECTION, SOLUTION INTRAVENOUS ONCE
Status: COMPLETED | OUTPATIENT
Start: 2020-10-29 | End: 2020-10-29

## 2020-10-29 RX ORDER — HYDRALAZINE HYDROCHLORIDE 20 MG/ML
5 INJECTION INTRAMUSCULAR; INTRAVENOUS
Status: DISCONTINUED | OUTPATIENT
Start: 2020-10-29 | End: 2020-10-29 | Stop reason: HOSPADM

## 2020-10-29 RX ORDER — HYDROMORPHONE HYDROCHLORIDE 1 MG/ML
0.5 INJECTION, SOLUTION INTRAMUSCULAR; INTRAVENOUS; SUBCUTANEOUS
Status: DISCONTINUED | OUTPATIENT
Start: 2020-10-29 | End: 2020-10-29 | Stop reason: HOSPADM

## 2020-10-29 RX ORDER — MIDAZOLAM HYDROCHLORIDE 1 MG/ML
1 INJECTION INTRAMUSCULAR; INTRAVENOUS
Status: DISCONTINUED | OUTPATIENT
Start: 2020-10-29 | End: 2020-10-29 | Stop reason: HOSPADM

## 2020-10-29 RX ORDER — MAGNESIUM HYDROXIDE 1200 MG/15ML
LIQUID ORAL AS NEEDED
Status: DISCONTINUED | OUTPATIENT
Start: 2020-10-29 | End: 2020-10-29 | Stop reason: HOSPADM

## 2020-10-29 RX ORDER — PROPOFOL 10 MG/ML
VIAL (ML) INTRAVENOUS AS NEEDED
Status: DISCONTINUED | OUTPATIENT
Start: 2020-10-29 | End: 2020-10-29 | Stop reason: SURG

## 2020-10-29 RX ORDER — EPHEDRINE SULFATE 50 MG/ML
5 INJECTION, SOLUTION INTRAVENOUS ONCE AS NEEDED
Status: DISCONTINUED | OUTPATIENT
Start: 2020-10-29 | End: 2020-10-29 | Stop reason: HOSPADM

## 2020-10-29 RX ORDER — PROMETHAZINE HYDROCHLORIDE 25 MG/1
25 SUPPOSITORY RECTAL ONCE AS NEEDED
Status: DISCONTINUED | OUTPATIENT
Start: 2020-10-29 | End: 2020-10-29 | Stop reason: HOSPADM

## 2020-10-29 RX ORDER — ROCURONIUM BROMIDE 10 MG/ML
INJECTION, SOLUTION INTRAVENOUS AS NEEDED
Status: DISCONTINUED | OUTPATIENT
Start: 2020-10-29 | End: 2020-10-29 | Stop reason: SURG

## 2020-10-29 RX ORDER — FLUMAZENIL 0.1 MG/ML
0.2 INJECTION INTRAVENOUS AS NEEDED
Status: DISCONTINUED | OUTPATIENT
Start: 2020-10-29 | End: 2020-10-29 | Stop reason: HOSPADM

## 2020-10-29 RX ORDER — LIDOCAINE HYDROCHLORIDE 10 MG/ML
0.5 INJECTION, SOLUTION EPIDURAL; INFILTRATION; INTRACAUDAL; PERINEURAL ONCE AS NEEDED
Status: DISCONTINUED | OUTPATIENT
Start: 2020-10-29 | End: 2020-10-29 | Stop reason: HOSPADM

## 2020-10-29 RX ORDER — HYDROCODONE BITARTRATE AND ACETAMINOPHEN 7.5; 325 MG/1; MG/1
1 TABLET ORAL EVERY 6 HOURS PRN
Qty: 40 TABLET | Refills: 0 | OUTPATIENT
Start: 2020-10-29 | End: 2021-08-07

## 2020-10-29 RX ORDER — FENTANYL CITRATE 50 UG/ML
INJECTION, SOLUTION INTRAMUSCULAR; INTRAVENOUS AS NEEDED
Status: DISCONTINUED | OUTPATIENT
Start: 2020-10-29 | End: 2020-10-29 | Stop reason: SURG

## 2020-10-29 RX ORDER — PROMETHAZINE HYDROCHLORIDE 25 MG/1
25 TABLET ORAL ONCE AS NEEDED
Status: DISCONTINUED | OUTPATIENT
Start: 2020-10-29 | End: 2020-10-29 | Stop reason: HOSPADM

## 2020-10-29 RX ORDER — SULFAMETHOXAZOLE AND TRIMETHOPRIM 800; 160 MG/1; MG/1
1 TABLET ORAL 2 TIMES DAILY
Qty: 20 TABLET | Refills: 0 | Status: SHIPPED | OUTPATIENT
Start: 2020-10-29 | End: 2022-01-01

## 2020-10-29 RX ORDER — SODIUM CHLORIDE 0.9 % (FLUSH) 0.9 %
3-10 SYRINGE (ML) INJECTION AS NEEDED
Status: DISCONTINUED | OUTPATIENT
Start: 2020-10-29 | End: 2020-10-29 | Stop reason: HOSPADM

## 2020-10-29 RX ORDER — HYDROCODONE BITARTRATE AND ACETAMINOPHEN 7.5; 325 MG/1; MG/1
1 TABLET ORAL ONCE AS NEEDED
Status: DISCONTINUED | OUTPATIENT
Start: 2020-10-29 | End: 2020-10-29 | Stop reason: HOSPADM

## 2020-10-29 RX ORDER — SUCCINYLCHOLINE CHLORIDE 20 MG/ML
INJECTION INTRAMUSCULAR; INTRAVENOUS AS NEEDED
Status: DISCONTINUED | OUTPATIENT
Start: 2020-10-29 | End: 2020-10-29 | Stop reason: SURG

## 2020-10-29 RX ORDER — SODIUM CHLORIDE 0.9 % (FLUSH) 0.9 %
3 SYRINGE (ML) INJECTION EVERY 12 HOURS SCHEDULED
Status: DISCONTINUED | OUTPATIENT
Start: 2020-10-29 | End: 2020-10-29 | Stop reason: HOSPADM

## 2020-10-29 RX ORDER — HYDROCODONE BITARTRATE AND ACETAMINOPHEN 7.5; 325 MG/1; MG/1
1 TABLET ORAL ONCE AS NEEDED
Status: COMPLETED | OUTPATIENT
Start: 2020-10-29 | End: 2020-10-29

## 2020-10-29 RX ORDER — NALOXONE HCL 0.4 MG/ML
0.2 VIAL (ML) INJECTION AS NEEDED
Status: DISCONTINUED | OUTPATIENT
Start: 2020-10-29 | End: 2020-10-29 | Stop reason: HOSPADM

## 2020-10-29 RX ADMIN — LABETALOL HYDROCHLORIDE 5 MG: 5 INJECTION, SOLUTION INTRAVENOUS at 17:40

## 2020-10-29 RX ADMIN — MIDAZOLAM 1 MG: 1 INJECTION INTRAMUSCULAR; INTRAVENOUS at 15:16

## 2020-10-29 RX ADMIN — ROCURONIUM BROMIDE 5 MG: 10 INJECTION INTRAVENOUS at 16:32

## 2020-10-29 RX ADMIN — GENTAMICIN SULFATE 120 MG: 60 INJECTION, SOLUTION INTRAVENOUS at 16:14

## 2020-10-29 RX ADMIN — FENTANYL CITRATE 100 MCG: 50 INJECTION INTRAMUSCULAR; INTRAVENOUS at 16:37

## 2020-10-29 RX ADMIN — SODIUM CHLORIDE, POTASSIUM CHLORIDE, SODIUM LACTATE AND CALCIUM CHLORIDE: 600; 310; 30; 20 INJECTION, SOLUTION INTRAVENOUS at 16:26

## 2020-10-29 RX ADMIN — FAMOTIDINE 20 MG: 10 INJECTION INTRAVENOUS at 15:16

## 2020-10-29 RX ADMIN — LABETALOL HYDROCHLORIDE 5 MG: 5 INJECTION, SOLUTION INTRAVENOUS at 17:48

## 2020-10-29 RX ADMIN — PROPOFOL 250 MG: 10 INJECTION, EMULSION INTRAVENOUS at 16:33

## 2020-10-29 RX ADMIN — LIDOCAINE HYDROCHLORIDE 100 MG: 20 INJECTION, SOLUTION INFILTRATION; PERINEURAL at 16:33

## 2020-10-29 RX ADMIN — SODIUM CHLORIDE, POTASSIUM CHLORIDE, SODIUM LACTATE AND CALCIUM CHLORIDE 9 ML/HR: 600; 310; 30; 20 INJECTION, SOLUTION INTRAVENOUS at 14:26

## 2020-10-29 RX ADMIN — SUGAMMADEX 400 MG: 100 INJECTION, SOLUTION INTRAVENOUS at 17:12

## 2020-10-29 RX ADMIN — SUCCINYLCHOLINE CHLORIDE 200 MG: 20 INJECTION, SOLUTION INTRAMUSCULAR; INTRAVENOUS; PARENTERAL at 16:33

## 2020-10-29 RX ADMIN — HYDROCODONE BITARTRATE AND ACETAMINOPHEN 1 TABLET: 7.5; 325 TABLET ORAL at 18:22

## 2020-10-29 RX ADMIN — ROCURONIUM BROMIDE 45 MG: 10 INJECTION INTRAVENOUS at 16:53

## 2020-10-29 NOTE — ANESTHESIA PREPROCEDURE EVALUATION
Anesthesia Evaluation     Patient summary reviewed and Nursing notes reviewed                Airway   Mallampati: III  Possible difficult intubation  Dental      Pulmonary    (+) COPD, asthma,sleep apnea,   Cardiovascular     ECG reviewed  Rhythm: regular  Rate: normal    (+) DVT,       Neuro/Psych  (+) psychiatric history Anxiety, Depression and Bipolar,     GI/Hepatic/Renal/Endo    (+) morbid obesity,  diabetes mellitus type 2,     Musculoskeletal     Abdominal    Substance History   (+) alcohol use,      OB/GYN negative ob/gyn ROS         Other   arthritis,                      Anesthesia Plan    ASA 3     general   (LMA    ETOH history)  intravenous induction     Anesthetic plan, all risks, benefits, and alternatives have been provided, discussed and informed consent has been obtained with: patient.

## 2020-10-29 NOTE — ANESTHESIA PROCEDURE NOTES
Airway  Urgency: elective    Date/Time: 10/29/2020 4:35 PM  End Time:10/29/2020 4:35 PM  Airway not difficult    General Information and Staff    Patient location during procedure: OR  Anesthesiologist: Campos Laws MD    Indications and Patient Condition  Indications for airway management: airway protection    Preoxygenated: yes  Mask difficulty assessment: 0 - not attempted    Final Airway Details  Final airway type: endotracheal airway      Successful airway: ETT  Cuffed: yes   Successful intubation technique: direct laryngoscopy and video laryngoscopy  Facilitating devices/methods: intubating stylet  Endotracheal tube insertion site: oral  Blade: Perdomo  Blade size: D  ETT size (mm): 8.0  Cormack-Lehane Classification: grade IIa - partial view of glottis  Placement verified by: chest auscultation and capnometry   Number of attempts at approach: 1

## 2020-10-30 NOTE — ANESTHESIA POSTPROCEDURE EVALUATION
"Patient: Richard Kim    Procedure Summary     Date: 10/29/20 Room / Location: [facility] OR  / [facility] MAIN OR    Anesthesia Start: 1626 Anesthesia Stop: 1733    Procedure: MANIPULATION OF PENILE PROSTHESIS (N/A Penis) Diagnosis:       Malfunction of penile prosthesis, initial encounter (CMS/[facility])      (Malfunctioning penile prosthesis)    Surgeon: Campos Hopkins MD Provider: Campos Laws MD    Anesthesia Type: general ASA Status: 3          Anesthesia Type: general    Vitals  Vitals Value Taken Time   /105 10/29/20 1825   Temp 36.7 °C (98 °F) 10/29/20 1825   Pulse 97 10/29/20 1825   Resp 20 10/29/20 1825   SpO2 98 % 10/29/20 1825           Post Anesthesia Care and Evaluation    Patient participation: complete - patient cannot participate  Anesthetic complications: No anesthetic complications    Cardiovascular status: acceptable  Respiratory status: acceptable  Hydration status: acceptable    Comments: Patient was discharged prior to being evaluated by Anesthesia...per chart review, no anesthetic complications were noted.  NOT MEDICALLY DIRECTED  /99   Pulse 96   Temp 36.7 °C (98 °F) (Oral)   Resp 20   Ht 175.3 cm (69\")   Wt 95.9 kg (211 lb 6.7 oz)   SpO2 97%   BMI 31.22 kg/m²         "

## 2021-04-24 ENCOUNTER — IMMUNIZATION (OUTPATIENT)
Dept: VACCINE CLINIC | Facility: HOSPITAL | Age: 56
End: 2021-04-24

## 2021-04-24 PROCEDURE — 0001A: CPT | Performed by: OBSTETRICS & GYNECOLOGY

## 2021-04-24 PROCEDURE — 91300 HC SARSCOV02 VAC 30MCG/0.3ML IM: CPT | Performed by: OBSTETRICS & GYNECOLOGY

## 2021-05-15 ENCOUNTER — IMMUNIZATION (OUTPATIENT)
Dept: VACCINE CLINIC | Facility: HOSPITAL | Age: 56
End: 2021-05-15

## 2021-05-15 PROCEDURE — 91300 HC SARSCOV02 VAC 30MCG/0.3ML IM: CPT | Performed by: OBSTETRICS & GYNECOLOGY

## 2021-05-15 PROCEDURE — 0002A: CPT | Performed by: OBSTETRICS & GYNECOLOGY

## 2021-08-07 ENCOUNTER — APPOINTMENT (OUTPATIENT)
Dept: CT IMAGING | Facility: HOSPITAL | Age: 56
End: 2021-08-07

## 2021-08-07 ENCOUNTER — HOSPITAL ENCOUNTER (EMERGENCY)
Facility: HOSPITAL | Age: 56
Discharge: HOME | End: 2021-08-07
Attending: EMERGENCY MEDICINE | Admitting: EMERGENCY MEDICINE

## 2021-08-07 VITALS
WEIGHT: 210 LBS | OXYGEN SATURATION: 95 % | TEMPERATURE: 98.7 F | DIASTOLIC BLOOD PRESSURE: 74 MMHG | RESPIRATION RATE: 25 BRPM | HEIGHT: 69 IN | BODY MASS INDEX: 31.1 KG/M2 | HEART RATE: 104 BPM | SYSTOLIC BLOOD PRESSURE: 142 MMHG
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
## 2021-08-07 DIAGNOSIS — S20.212A CONTUSION OF LEFT CHEST WALL, INITIAL ENCOUNTER: Primary | ICD-10-CM

## 2021-08-07 LAB
ALBUMIN SERPL-MCNC: 4 G/DL (ref 3.5–5.2)
ALBUMIN/GLOB SERPL: 1.3 G/DL
ALP SERPL-CCNC: 80 U/L (ref 39–117)
ALT SERPL W P-5'-P-CCNC: 22 U/L (ref 1–41)
ANION GAP SERPL CALCULATED.3IONS-SCNC: 13.3 MMOL/L (ref 5–15)
AST SERPL-CCNC: 23 U/L (ref 1–40)
BASOPHILS # BLD AUTO: 0.03 10*3/MM3 (ref 0–0.2)
BASOPHILS NFR BLD AUTO: 0.3 % (ref 0–1.5)
BILIRUB SERPL-MCNC: 0.4 MG/DL (ref 0–1.2)
BUN SERPL-MCNC: 6 MG/DL (ref 6–20)
BUN/CREAT SERPL: 7.4 (ref 7–25)
CALCIUM SPEC-SCNC: 9.5 MG/DL (ref 8.6–10.5)
CHLORIDE SERPL-SCNC: 94 MMOL/L (ref 98–107)
CO2 SERPL-SCNC: 22.7 MMOL/L (ref 22–29)
CREAT SERPL-MCNC: 0.81 MG/DL (ref 0.76–1.27)
DEPRECATED RDW RBC AUTO: 39.7 FL (ref 37–54)
EOSINOPHIL # BLD AUTO: 0.25 10*3/MM3 (ref 0–0.4)
EOSINOPHIL NFR BLD AUTO: 2.9 % (ref 0.3–6.2)
ERYTHROCYTE [DISTWIDTH] IN BLOOD BY AUTOMATED COUNT: 11.9 % (ref 12.3–15.4)
GFR SERPL CREATININE-BSD FRML MDRD: 98 ML/MIN/1.73
GLOBULIN UR ELPH-MCNC: 3 GM/DL
GLUCOSE SERPL-MCNC: 266 MG/DL (ref 65–99)
HCT VFR BLD AUTO: 47.1 % (ref 37.5–51)
HGB BLD-MCNC: 16.2 G/DL (ref 13–17.7)
IMM GRANULOCYTES # BLD AUTO: 0.02 10*3/MM3 (ref 0–0.05)
IMM GRANULOCYTES NFR BLD AUTO: 0.2 % (ref 0–0.5)
LYMPHOCYTES # BLD AUTO: 1.96 10*3/MM3 (ref 0.7–3.1)
LYMPHOCYTES NFR BLD AUTO: 22.5 % (ref 19.6–45.3)
MCH RBC QN AUTO: 31.2 PG (ref 26.6–33)
MCHC RBC AUTO-ENTMCNC: 34.4 G/DL (ref 31.5–35.7)
MCV RBC AUTO: 90.8 FL (ref 79–97)
MONOCYTES # BLD AUTO: 0.82 10*3/MM3 (ref 0.1–0.9)
MONOCYTES NFR BLD AUTO: 9.4 % (ref 5–12)
NEUTROPHILS NFR BLD AUTO: 5.64 10*3/MM3 (ref 1.7–7)
NEUTROPHILS NFR BLD AUTO: 64.7 % (ref 42.7–76)
NRBC BLD AUTO-RTO: 0 /100 WBC (ref 0–0.2)
PLATELET # BLD AUTO: 234 10*3/MM3 (ref 140–450)
PMV BLD AUTO: 9 FL (ref 6–12)
POTASSIUM SERPL-SCNC: 4.2 MMOL/L (ref 3.5–5.2)
PROT SERPL-MCNC: 7 G/DL (ref 6–8.5)
RBC # BLD AUTO: 5.19 10*6/MM3 (ref 4.14–5.8)
SODIUM SERPL-SCNC: 130 MMOL/L (ref 136–145)
TROPONIN T SERPL-MCNC: <0.01 NG/ML (ref 0–0.03)
WBC # BLD AUTO: 8.72 10*3/MM3 (ref 3.4–10.8)

## 2021-08-07 PROCEDURE — 84484 ASSAY OF TROPONIN QUANT: CPT | Performed by: EMERGENCY MEDICINE

## 2021-08-07 PROCEDURE — 71250 CT THORAX DX C-: CPT

## 2021-08-07 PROCEDURE — 99283 EMERGENCY DEPT VISIT LOW MDM: CPT | Performed by: EMERGENCY MEDICINE

## 2021-08-07 PROCEDURE — 80053 COMPREHEN METABOLIC PANEL: CPT | Performed by: EMERGENCY MEDICINE

## 2021-08-07 PROCEDURE — 96374 THER/PROPH/DIAG INJ IV PUSH: CPT

## 2021-08-07 PROCEDURE — 25010000002 HYDROMORPHONE PER 4 MG: Performed by: EMERGENCY MEDICINE

## 2021-08-07 PROCEDURE — 85025 COMPLETE CBC W/AUTO DIFF WBC: CPT | Performed by: EMERGENCY MEDICINE

## 2021-08-07 PROCEDURE — 99283 EMERGENCY DEPT VISIT LOW MDM: CPT

## 2021-08-07 RX ORDER — SODIUM CHLORIDE 0.9 % (FLUSH) 0.9 %
10 SYRINGE (ML) INJECTION AS NEEDED
Status: DISCONTINUED | OUTPATIENT
Start: 2021-08-07 | End: 2021-08-07 | Stop reason: HOSPADM

## 2021-08-07 RX ORDER — HYDROMORPHONE HCL 110MG/55ML
0.5 PATIENT CONTROLLED ANALGESIA SYRINGE INTRAVENOUS ONCE
Status: COMPLETED | OUTPATIENT
Start: 2021-08-07 | End: 2021-08-07

## 2021-08-07 RX ORDER — HYDROCODONE BITARTRATE AND ACETAMINOPHEN 5; 325 MG/1; MG/1
1 TABLET ORAL EVERY 4 HOURS PRN
Qty: 12 TABLET | Refills: 0 | Status: SHIPPED | OUTPATIENT
Start: 2021-08-07 | End: 2022-01-01

## 2021-08-07 RX ADMIN — HYDROMORPHONE HYDROCHLORIDE 0.5 MG: 2 INJECTION, SOLUTION INTRAMUSCULAR; INTRAVENOUS; SUBCUTANEOUS at 18:05
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
## 2022-01-01 ENCOUNTER — APPOINTMENT (OUTPATIENT)
Dept: GENERAL RADIOLOGY | Facility: HOSPITAL | Age: 57
End: 2022-01-01

## 2022-01-01 ENCOUNTER — APPOINTMENT (OUTPATIENT)
Dept: CT IMAGING | Facility: HOSPITAL | Age: 57
End: 2022-01-01

## 2022-01-01 ENCOUNTER — HOSPITAL ENCOUNTER (OUTPATIENT)
Facility: HOSPITAL | Age: 57
Discharge: HOME | End: 2022-01-03
Attending: EMERGENCY MEDICINE | Admitting: ORTHOPAEDIC SURGERY

## 2022-01-01 DIAGNOSIS — S82.831A CLOSED FRACTURE OF DISTAL END OF RIGHT FIBULA, UNSPECIFIED FRACTURE MORPHOLOGY, INITIAL ENCOUNTER: ICD-10-CM

## 2022-01-01 DIAGNOSIS — E11.9 DIABETES MELLITUS TYPE 2, NONINSULIN DEPENDENT: ICD-10-CM

## 2022-01-01 DIAGNOSIS — S82.301A CLOSED FRACTURE OF DISTAL END OF RIGHT TIBIA, UNSPECIFIED FRACTURE MORPHOLOGY, INITIAL ENCOUNTER: ICD-10-CM

## 2022-01-01 DIAGNOSIS — Z98.890 STATUS POST OPEN REDUCTION WITH INTERNAL FIXATION (ORIF) OF FRACTURE OF ANKLE: Primary | ICD-10-CM

## 2022-01-01 DIAGNOSIS — Z87.81 STATUS POST OPEN REDUCTION WITH INTERNAL FIXATION (ORIF) OF FRACTURE OF ANKLE: Primary | ICD-10-CM

## 2022-01-01 PROBLEM — S82.899A ANKLE FRACTURE: Status: ACTIVE | Noted: 2022-01-01

## 2022-01-01 LAB
ALBUMIN SERPL-MCNC: 4.4 G/DL (ref 3.5–5.2)
ALBUMIN/GLOB SERPL: 1.3 G/DL
ALP SERPL-CCNC: 140 U/L (ref 39–117)
ALT SERPL W P-5'-P-CCNC: 24 U/L (ref 1–41)
ANION GAP SERPL CALCULATED.3IONS-SCNC: 17.6 MMOL/L (ref 5–15)
AST SERPL-CCNC: 16 U/L (ref 1–40)
BASOPHILS # BLD AUTO: 0.04 10*3/MM3 (ref 0–0.2)
BASOPHILS NFR BLD AUTO: 0.4 % (ref 0–1.5)
BILIRUB SERPL-MCNC: 0.3 MG/DL (ref 0–1.2)
BUN SERPL-MCNC: 9 MG/DL (ref 6–20)
BUN/CREAT SERPL: 10.7 (ref 7–25)
CALCIUM SPEC-SCNC: 9.6 MG/DL (ref 8.6–10.5)
CHLORIDE SERPL-SCNC: 93 MMOL/L (ref 98–107)
CO2 SERPL-SCNC: 21.4 MMOL/L (ref 22–29)
CREAT SERPL-MCNC: 0.84 MG/DL (ref 0.76–1.27)
DEPRECATED RDW RBC AUTO: 40.8 FL (ref 37–54)
EOSINOPHIL # BLD AUTO: 0.26 10*3/MM3 (ref 0–0.4)
EOSINOPHIL NFR BLD AUTO: 2.9 % (ref 0.3–6.2)
ERYTHROCYTE [DISTWIDTH] IN BLOOD BY AUTOMATED COUNT: 11.9 % (ref 12.3–15.4)
GFR SERPL CREATININE-BSD FRML MDRD: 94 ML/MIN/1.73
GLOBULIN UR ELPH-MCNC: 3.4 GM/DL
GLUCOSE BLDC GLUCOMTR-MCNC: 310 MG/DL (ref 70–130)
GLUCOSE SERPL-MCNC: 341 MG/DL (ref 65–99)
HBA1C MFR BLD: 10 % (ref 4.8–5.6)
HCT VFR BLD AUTO: 50 % (ref 37.5–51)
HGB BLD-MCNC: 16.8 G/DL (ref 13–17.7)
IMM GRANULOCYTES # BLD AUTO: 0.03 10*3/MM3 (ref 0–0.05)
IMM GRANULOCYTES NFR BLD AUTO: 0.3 % (ref 0–0.5)
LYMPHOCYTES # BLD AUTO: 2.13 10*3/MM3 (ref 0.7–3.1)
LYMPHOCYTES NFR BLD AUTO: 23.6 % (ref 19.6–45.3)
MCH RBC QN AUTO: 31.2 PG (ref 26.6–33)
MCHC RBC AUTO-ENTMCNC: 33.6 G/DL (ref 31.5–35.7)
MCV RBC AUTO: 92.9 FL (ref 79–97)
MONOCYTES # BLD AUTO: 0.81 10*3/MM3 (ref 0.1–0.9)
MONOCYTES NFR BLD AUTO: 9 % (ref 5–12)
NEUTROPHILS NFR BLD AUTO: 5.77 10*3/MM3 (ref 1.7–7)
NEUTROPHILS NFR BLD AUTO: 63.8 % (ref 42.7–76)
NRBC BLD AUTO-RTO: 0 /100 WBC (ref 0–0.2)
PLATELET # BLD AUTO: 240 10*3/MM3 (ref 140–450)
PMV BLD AUTO: 10.1 FL (ref 6–12)
POTASSIUM SERPL-SCNC: 4.3 MMOL/L (ref 3.5–5.2)
PROT SERPL-MCNC: 7.8 G/DL (ref 6–8.5)
RBC # BLD AUTO: 5.38 10*6/MM3 (ref 4.14–5.8)
SARS-COV-2 RNA PNL SPEC NAA+PROBE: NOT DETECTED
SODIUM SERPL-SCNC: 132 MMOL/L (ref 136–145)
WBC NRBC COR # BLD: 9.04 10*3/MM3 (ref 3.4–10.8)

## 2022-01-01 PROCEDURE — 85025 COMPLETE CBC W/AUTO DIFF WBC: CPT

## 2022-01-01 PROCEDURE — 94799 UNLISTED PULMONARY SVC/PX: CPT

## 2022-01-01 PROCEDURE — 73600 X-RAY EXAM OF ANKLE: CPT

## 2022-01-01 PROCEDURE — 73610 X-RAY EXAM OF ANKLE: CPT

## 2022-01-01 PROCEDURE — 25010000002 MORPHINE PER 10 MG: Performed by: EMERGENCY MEDICINE

## 2022-01-01 PROCEDURE — 73700 CT LOWER EXTREMITY W/O DYE: CPT

## 2022-01-01 PROCEDURE — 63710000001 INSULIN DETEMIR PER 5 UNITS: Performed by: INTERNAL MEDICINE

## 2022-01-01 PROCEDURE — 99220 PR INITIAL OBSERVATION CARE/DAY 70 MINUTES: CPT | Performed by: INTERNAL MEDICINE

## 2022-01-01 PROCEDURE — 25010000002 MIDAZOLAM PER 1 MG: Performed by: EMERGENCY MEDICINE

## 2022-01-01 PROCEDURE — 71045 X-RAY EXAM CHEST 1 VIEW: CPT

## 2022-01-01 PROCEDURE — 96376 TX/PRO/DX INJ SAME DRUG ADON: CPT

## 2022-01-01 PROCEDURE — C9803 HOPD COVID-19 SPEC COLLECT: HCPCS

## 2022-01-01 PROCEDURE — 63710000001 INSULIN ASPART PER 5 UNITS: Performed by: INTERNAL MEDICINE

## 2022-01-01 PROCEDURE — G0378 HOSPITAL OBSERVATION PER HR: HCPCS

## 2022-01-01 PROCEDURE — 80053 COMPREHEN METABOLIC PANEL: CPT

## 2022-01-01 PROCEDURE — 94640 AIRWAY INHALATION TREATMENT: CPT

## 2022-01-01 PROCEDURE — 83036 HEMOGLOBIN GLYCOSYLATED A1C: CPT | Performed by: INTERNAL MEDICINE

## 2022-01-01 PROCEDURE — 99285 EMERGENCY DEPT VISIT HI MDM: CPT

## 2022-01-01 PROCEDURE — 87635 SARS-COV-2 COVID-19 AMP PRB: CPT

## 2022-01-01 PROCEDURE — 96375 TX/PRO/DX INJ NEW DRUG ADDON: CPT

## 2022-01-01 PROCEDURE — 25010000002 ONDANSETRON PER 1 MG

## 2022-01-01 PROCEDURE — 25010000002 FENTANYL CITRATE (PF) 50 MCG/ML SOLUTION: Performed by: EMERGENCY MEDICINE

## 2022-01-01 PROCEDURE — 96374 THER/PROPH/DIAG INJ IV PUSH: CPT

## 2022-01-01 PROCEDURE — 82962 GLUCOSE BLOOD TEST: CPT

## 2022-01-01 PROCEDURE — 96361 HYDRATE IV INFUSION ADD-ON: CPT

## 2022-01-01 PROCEDURE — 99285 EMERGENCY DEPT VISIT HI MDM: CPT | Performed by: EMERGENCY MEDICINE

## 2022-01-01 RX ORDER — SODIUM CHLORIDE 0.9 % (FLUSH) 0.9 %
10 SYRINGE (ML) INJECTION EVERY 12 HOURS SCHEDULED
Status: DISCONTINUED | OUTPATIENT
Start: 2022-01-01 | End: 2022-01-03 | Stop reason: HOSPADM

## 2022-01-01 RX ORDER — ACETAMINOPHEN 160 MG/5ML
650 SOLUTION ORAL EVERY 4 HOURS PRN
Status: DISCONTINUED | OUTPATIENT
Start: 2022-01-01 | End: 2022-01-02

## 2022-01-01 RX ORDER — SODIUM CHLORIDE 9 MG/ML
100 INJECTION, SOLUTION INTRAVENOUS CONTINUOUS
Status: DISCONTINUED | OUTPATIENT
Start: 2022-01-01 | End: 2022-01-02

## 2022-01-01 RX ORDER — SODIUM CHLORIDE 0.9 % (FLUSH) 0.9 %
10 SYRINGE (ML) INJECTION AS NEEDED
Status: DISCONTINUED | OUTPATIENT
Start: 2022-01-01 | End: 2022-01-03 | Stop reason: HOSPADM

## 2022-01-01 RX ORDER — MORPHINE SULFATE 1 MG/ML
INJECTION, SOLUTION EPIDURAL; INTRATHECAL; INTRAVENOUS
Status: COMPLETED | OUTPATIENT
Start: 2022-01-01 | End: 2022-01-01

## 2022-01-01 RX ORDER — MIDAZOLAM HYDROCHLORIDE 2 MG/2ML
INJECTION, SOLUTION INTRAMUSCULAR; INTRAVENOUS
Status: DISPENSED
Start: 2022-01-01 | End: 2022-01-02

## 2022-01-01 RX ORDER — SODIUM CHLORIDE 9 MG/ML
INJECTION, SOLUTION INTRAVENOUS
Status: DISPENSED
Start: 2022-01-01 | End: 2022-01-02

## 2022-01-01 RX ORDER — ALBUTEROL SULFATE 2.5 MG/3ML
2.5 SOLUTION RESPIRATORY (INHALATION) EVERY 4 HOURS PRN
Status: DISCONTINUED | OUTPATIENT
Start: 2022-01-01 | End: 2022-01-03 | Stop reason: HOSPADM

## 2022-01-01 RX ORDER — TIZANIDINE 4 MG/1
4 TABLET ORAL 3 TIMES DAILY PRN
Status: DISCONTINUED | OUTPATIENT
Start: 2022-01-01 | End: 2022-01-03 | Stop reason: HOSPADM

## 2022-01-01 RX ORDER — ACETAMINOPHEN 650 MG/1
650 SUPPOSITORY RECTAL EVERY 4 HOURS PRN
Status: DISCONTINUED | OUTPATIENT
Start: 2022-01-01 | End: 2022-01-02

## 2022-01-01 RX ORDER — TRAZODONE HYDROCHLORIDE 50 MG/1
50 TABLET ORAL NIGHTLY
Status: DISCONTINUED | OUTPATIENT
Start: 2022-01-01 | End: 2022-01-03 | Stop reason: HOSPADM

## 2022-01-01 RX ORDER — SODIUM CHLORIDE 9 MG/ML
40 INJECTION, SOLUTION INTRAVENOUS AS NEEDED
Status: DISCONTINUED | OUTPATIENT
Start: 2022-01-01 | End: 2022-01-03 | Stop reason: HOSPADM

## 2022-01-01 RX ORDER — ONDANSETRON 2 MG/ML
INJECTION INTRAMUSCULAR; INTRAVENOUS
Status: COMPLETED
Start: 2022-01-01 | End: 2022-01-01

## 2022-01-01 RX ORDER — ROSUVASTATIN CALCIUM 5 MG/1
5 TABLET, COATED ORAL NIGHTLY
Status: DISCONTINUED | OUTPATIENT
Start: 2022-01-01 | End: 2022-01-03 | Stop reason: HOSPADM

## 2022-01-01 RX ORDER — BUDESONIDE AND FORMOTEROL FUMARATE DIHYDRATE 160; 4.5 UG/1; UG/1
2 AEROSOL RESPIRATORY (INHALATION)
Status: DISCONTINUED | OUTPATIENT
Start: 2022-01-01 | End: 2022-01-03 | Stop reason: HOSPADM

## 2022-01-01 RX ORDER — CHOLECALCIFEROL (VITAMIN D3) 125 MCG
5 CAPSULE ORAL NIGHTLY PRN
Status: DISCONTINUED | OUTPATIENT
Start: 2022-01-01 | End: 2022-01-03 | Stop reason: HOSPADM

## 2022-01-01 RX ORDER — ALUMINA, MAGNESIA, AND SIMETHICONE 2400; 2400; 240 MG/30ML; MG/30ML; MG/30ML
15 SUSPENSION ORAL EVERY 6 HOURS PRN
Status: DISCONTINUED | OUTPATIENT
Start: 2022-01-01 | End: 2022-01-03 | Stop reason: HOSPADM

## 2022-01-01 RX ORDER — DEXTROSE MONOHYDRATE 25 G/50ML
25 INJECTION, SOLUTION INTRAVENOUS
Status: DISCONTINUED | OUTPATIENT
Start: 2022-01-01 | End: 2022-01-03 | Stop reason: HOSPADM

## 2022-01-01 RX ORDER — ETOMIDATE 2 MG/ML
INJECTION INTRAVENOUS
Status: COMPLETED
Start: 2022-01-01 | End: 2022-01-01

## 2022-01-01 RX ORDER — NALOXONE HCL 0.4 MG/ML
0.4 VIAL (ML) INJECTION
Status: DISCONTINUED | OUTPATIENT
Start: 2022-01-01 | End: 2022-01-02

## 2022-01-01 RX ORDER — GABAPENTIN 300 MG/1
600 CAPSULE ORAL 3 TIMES DAILY
Status: DISCONTINUED | OUTPATIENT
Start: 2022-01-01 | End: 2022-01-03 | Stop reason: HOSPADM

## 2022-01-01 RX ORDER — IPRATROPIUM BROMIDE AND ALBUTEROL SULFATE 2.5; .5 MG/3ML; MG/3ML
3 SOLUTION RESPIRATORY (INHALATION) ONCE
Status: COMPLETED | OUTPATIENT
Start: 2022-01-01 | End: 2022-01-01

## 2022-01-01 RX ORDER — ALBUTEROL SULFATE 2.5 MG/3ML
2.5 SOLUTION RESPIRATORY (INHALATION) 4 TIMES DAILY
Status: DISCONTINUED | OUTPATIENT
Start: 2022-01-01 | End: 2022-01-03

## 2022-01-01 RX ORDER — MIDAZOLAM HYDROCHLORIDE 1 MG/ML
INJECTION INTRAMUSCULAR; INTRAVENOUS
Status: COMPLETED | OUTPATIENT
Start: 2022-01-01 | End: 2022-01-01

## 2022-01-01 RX ORDER — OXYCODONE AND ACETAMINOPHEN 7.5; 325 MG/1; MG/1
1 TABLET ORAL EVERY 4 HOURS PRN
Status: DISCONTINUED | OUTPATIENT
Start: 2022-01-01 | End: 2022-01-02

## 2022-01-01 RX ORDER — FENTANYL CITRATE 50 UG/ML
50 INJECTION, SOLUTION INTRAMUSCULAR; INTRAVENOUS ONCE
Status: COMPLETED | OUTPATIENT
Start: 2022-01-01 | End: 2022-01-01

## 2022-01-01 RX ORDER — ONDANSETRON 2 MG/ML
4 INJECTION INTRAMUSCULAR; INTRAVENOUS EVERY 6 HOURS PRN
Status: DISCONTINUED | OUTPATIENT
Start: 2022-01-01 | End: 2022-01-03 | Stop reason: HOSPADM

## 2022-01-01 RX ORDER — BISACODYL 10 MG
10 SUPPOSITORY, RECTAL RECTAL DAILY PRN
Status: DISCONTINUED | OUTPATIENT
Start: 2022-01-01 | End: 2022-01-03 | Stop reason: HOSPADM

## 2022-01-01 RX ORDER — ALBUTEROL SULFATE 2.5 MG/3ML
2.5 SOLUTION RESPIRATORY (INHALATION) ONCE
Status: COMPLETED | OUTPATIENT
Start: 2022-01-01 | End: 2022-01-01

## 2022-01-01 RX ORDER — FENTANYL CITRATE 50 UG/ML
100 INJECTION, SOLUTION INTRAMUSCULAR; INTRAVENOUS ONCE
Status: COMPLETED | OUTPATIENT
Start: 2022-01-01 | End: 2022-01-01

## 2022-01-01 RX ORDER — BENZTROPINE MESYLATE 1 MG/1
1 TABLET ORAL EVERY 12 HOURS SCHEDULED
Status: DISCONTINUED | OUTPATIENT
Start: 2022-01-01 | End: 2022-01-03 | Stop reason: HOSPADM

## 2022-01-01 RX ORDER — AMITRIPTYLINE HYDROCHLORIDE 25 MG/1
25 TABLET, FILM COATED ORAL NIGHTLY
Status: DISCONTINUED | OUTPATIENT
Start: 2022-01-01 | End: 2022-01-03 | Stop reason: HOSPADM

## 2022-01-01 RX ORDER — ONDANSETRON 4 MG/1
4 TABLET, FILM COATED ORAL EVERY 6 HOURS PRN
Status: DISCONTINUED | OUTPATIENT
Start: 2022-01-01 | End: 2022-01-03 | Stop reason: HOSPADM

## 2022-01-01 RX ORDER — NICOTINE POLACRILEX 4 MG
15 LOZENGE BUCCAL
Status: DISCONTINUED | OUTPATIENT
Start: 2022-01-01 | End: 2022-01-03 | Stop reason: HOSPADM

## 2022-01-01 RX ORDER — IPRATROPIUM BROMIDE AND ALBUTEROL SULFATE 2.5; .5 MG/3ML; MG/3ML
3 SOLUTION RESPIRATORY (INHALATION) EVERY 6 HOURS PRN
Status: DISCONTINUED | OUTPATIENT
Start: 2022-01-01 | End: 2022-01-03 | Stop reason: HOSPADM

## 2022-01-01 RX ORDER — HYDROMORPHONE HCL 110MG/55ML
0.5 PATIENT CONTROLLED ANALGESIA SYRINGE INTRAVENOUS
Status: DISCONTINUED | OUTPATIENT
Start: 2022-01-01 | End: 2022-01-02

## 2022-01-01 RX ORDER — BISACODYL 5 MG/1
5 TABLET, DELAYED RELEASE ORAL DAILY PRN
Status: DISCONTINUED | OUTPATIENT
Start: 2022-01-01 | End: 2022-01-03 | Stop reason: HOSPADM

## 2022-01-01 RX ORDER — POLYETHYLENE GLYCOL 3350 17 G/17G
17 POWDER, FOR SOLUTION ORAL DAILY PRN
Status: DISCONTINUED | OUTPATIENT
Start: 2022-01-01 | End: 2022-01-03 | Stop reason: HOSPADM

## 2022-01-01 RX ORDER — ACETAMINOPHEN 325 MG/1
650 TABLET ORAL EVERY 4 HOURS PRN
Status: DISCONTINUED | OUTPATIENT
Start: 2022-01-01 | End: 2022-01-02

## 2022-01-01 RX ADMIN — INSULIN DETEMIR 10 UNITS: 100 INJECTION, SOLUTION SUBCUTANEOUS at 21:00

## 2022-01-01 RX ADMIN — MIDAZOLAM 2 MG: 1 INJECTION INTRAMUSCULAR; INTRAVENOUS at 15:49

## 2022-01-01 RX ADMIN — METHOHEXITAL SODIUM 80 MG: 500 INJECTION, POWDER, LYOPHILIZED, FOR SOLUTION INTRAMUSCULAR; INTRAVENOUS; RECTAL at 15:39

## 2022-01-01 RX ADMIN — ETOMIDATE 20 MG: 2 INJECTION, SOLUTION INTRAVENOUS at 15:53

## 2022-01-01 RX ADMIN — GABAPENTIN 600 MG: 300 CAPSULE ORAL at 21:14

## 2022-01-01 RX ADMIN — SODIUM CHLORIDE 100 ML/HR: 9 INJECTION, SOLUTION INTRAVENOUS at 21:16

## 2022-01-01 RX ADMIN — SODIUM CHLORIDE, PRESERVATIVE FREE 10 ML: 5 INJECTION INTRAVENOUS at 21:17

## 2022-01-01 RX ADMIN — IPRATROPIUM BROMIDE AND ALBUTEROL SULFATE 3 ML: .5; 3 SOLUTION RESPIRATORY (INHALATION) at 16:36

## 2022-01-01 RX ADMIN — METHOHEXITAL SODIUM 80 MG: 500 INJECTION, POWDER, LYOPHILIZED, FOR SOLUTION INTRAMUSCULAR; INTRAVENOUS; RECTAL at 15:36

## 2022-01-01 RX ADMIN — BENZTROPINE MESYLATE 1 MG: 1 TABLET ORAL at 21:15

## 2022-01-01 RX ADMIN — ROSUVASTATIN CALCIUM 5 MG: 5 TABLET, FILM COATED ORAL at 21:15

## 2022-01-01 RX ADMIN — AMITRIPTYLINE HYDROCHLORIDE 25 MG: 25 TABLET, FILM COATED ORAL at 21:15

## 2022-01-01 RX ADMIN — ONDANSETRON 8 MG: 2 INJECTION INTRAMUSCULAR; INTRAVENOUS at 16:02

## 2022-01-01 RX ADMIN — MIDAZOLAM 2 MG: 1 INJECTION INTRAMUSCULAR; INTRAVENOUS at 15:46

## 2022-01-01 RX ADMIN — ALBUTEROL SULFATE 2.5 MG: 2.5 SOLUTION RESPIRATORY (INHALATION) at 21:43

## 2022-01-01 RX ADMIN — METHOHEXITAL SODIUM 80 MG: 500 INJECTION, POWDER, LYOPHILIZED, FOR SOLUTION INTRAMUSCULAR; INTRAVENOUS; RECTAL at 15:30

## 2022-01-01 RX ADMIN — BUDESONIDE AND FORMOTEROL FUMARATE DIHYDRATE 2 PUFF: 160; 4.5 AEROSOL RESPIRATORY (INHALATION) at 21:44

## 2022-01-01 RX ADMIN — MORPHINE SULFATE 4 MG: 1 INJECTION, SOLUTION EPIDURAL; INTRATHECAL; INTRAVENOUS at 15:42

## 2022-01-01 RX ADMIN — TRAZODONE HYDROCHLORIDE 50 MG: 50 TABLET ORAL at 21:14

## 2022-01-01 RX ADMIN — OXYCODONE HYDROCHLORIDE AND ACETAMINOPHEN 1 TABLET: 7.5; 325 TABLET ORAL at 21:14

## 2022-01-01 RX ADMIN — ALBUTEROL SULFATE 2.5 MG: 2.5 SOLUTION RESPIRATORY (INHALATION) at 17:56

## 2022-01-01 RX ADMIN — FENTANYL CITRATE 100 MCG: 50 INJECTION INTRAMUSCULAR; INTRAVENOUS at 15:05

## 2022-01-01 RX ADMIN — FENTANYL CITRATE 50 MCG: 50 INJECTION INTRAMUSCULAR; INTRAVENOUS at 17:42

## 2022-01-01 RX ADMIN — ALBUTEROL SULFATE 2.5 MG: 2.5 SOLUTION RESPIRATORY (INHALATION) at 17:01

## 2022-01-01 RX ADMIN — INSULIN ASPART 10 UNITS: 100 INJECTION, SOLUTION INTRAVENOUS; SUBCUTANEOUS at 21:15

## 2022-01-02 ENCOUNTER — APPOINTMENT (OUTPATIENT)
Dept: GENERAL RADIOLOGY | Facility: HOSPITAL | Age: 57
End: 2022-01-02

## 2022-01-02 ENCOUNTER — ANESTHESIA (OUTPATIENT)
Dept: PERIOP | Facility: HOSPITAL | Age: 57
End: 2022-01-02

## 2022-01-02 ENCOUNTER — ANESTHESIA EVENT (OUTPATIENT)
Dept: PERIOP | Facility: HOSPITAL | Age: 57
End: 2022-01-02

## 2022-01-02 PROBLEM — S82.301A CLOSED FRACTURE OF RIGHT DISTAL TIBIA: Status: ACTIVE | Noted: 2022-01-01

## 2022-01-02 PROBLEM — S82.831A CLOSED FRACTURE OF RIGHT DISTAL FIBULA: Status: ACTIVE | Noted: 2022-01-01

## 2022-01-02 LAB
ABO GROUP BLD: NORMAL
ABO GROUP BLD: NORMAL
ALBUMIN SERPL-MCNC: 3.6 G/DL (ref 3.5–5.2)
ALBUMIN/GLOB SERPL: 1.2 G/DL
ALP SERPL-CCNC: 109 U/L (ref 39–117)
ALT SERPL W P-5'-P-CCNC: 18 U/L (ref 1–41)
ANION GAP SERPL CALCULATED.3IONS-SCNC: 12.5 MMOL/L (ref 5–15)
APTT PPP: 30.9 SECONDS (ref 24.3–38.1)
AST SERPL-CCNC: 15 U/L (ref 1–40)
BILIRUB SERPL-MCNC: 0.8 MG/DL (ref 0–1.2)
BLD GP AB SCN SERPL QL: NEGATIVE
BUN SERPL-MCNC: 12 MG/DL (ref 6–20)
BUN/CREAT SERPL: 14.6 (ref 7–25)
CALCIUM SPEC-SCNC: 8.7 MG/DL (ref 8.6–10.5)
CHLORIDE SERPL-SCNC: 94 MMOL/L (ref 98–107)
CO2 SERPL-SCNC: 23.5 MMOL/L (ref 22–29)
CREAT SERPL-MCNC: 0.82 MG/DL (ref 0.76–1.27)
DEPRECATED RDW RBC AUTO: 40.9 FL (ref 37–54)
ERYTHROCYTE [DISTWIDTH] IN BLOOD BY AUTOMATED COUNT: 11.9 % (ref 12.3–15.4)
GFR SERPL CREATININE-BSD FRML MDRD: 96 ML/MIN/1.73
GLOBULIN UR ELPH-MCNC: 2.9 GM/DL
GLUCOSE BLDC GLUCOMTR-MCNC: 211 MG/DL (ref 70–130)
GLUCOSE BLDC GLUCOMTR-MCNC: 253 MG/DL (ref 70–130)
GLUCOSE BLDC GLUCOMTR-MCNC: 286 MG/DL (ref 70–130)
GLUCOSE SERPL-MCNC: 282 MG/DL (ref 65–99)
HCT VFR BLD AUTO: 42.4 % (ref 37.5–51)
HGB BLD-MCNC: 14.4 G/DL (ref 13–17.7)
INR PPP: 1.06 (ref 0.9–1.1)
MCH RBC QN AUTO: 31.4 PG (ref 26.6–33)
MCHC RBC AUTO-ENTMCNC: 34 G/DL (ref 31.5–35.7)
MCV RBC AUTO: 92.4 FL (ref 79–97)
PLATELET # BLD AUTO: 207 10*3/MM3 (ref 140–450)
PMV BLD AUTO: 9.5 FL (ref 6–12)
POTASSIUM SERPL-SCNC: 4.7 MMOL/L (ref 3.5–5.2)
PROCALCITONIN SERPL-MCNC: 0.45 NG/ML (ref 0–0.25)
PROT SERPL-MCNC: 6.5 G/DL (ref 6–8.5)
PROTHROMBIN TIME: 14 SECONDS (ref 12.1–15)
RBC # BLD AUTO: 4.59 10*6/MM3 (ref 4.14–5.8)
RH BLD: POSITIVE
RH BLD: POSITIVE
SODIUM SERPL-SCNC: 130 MMOL/L (ref 136–145)
T&S EXPIRATION DATE: NORMAL
WBC NRBC COR # BLD: 16.44 10*3/MM3 (ref 3.4–10.8)

## 2022-01-02 PROCEDURE — 96361 HYDRATE IV INFUSION ADD-ON: CPT

## 2022-01-02 PROCEDURE — 85730 THROMBOPLASTIN TIME PARTIAL: CPT | Performed by: ORTHOPAEDIC SURGERY

## 2022-01-02 PROCEDURE — C9290 INJ, BUPIVACAINE LIPOSOME: HCPCS | Performed by: ORTHOPAEDIC SURGERY

## 2022-01-02 PROCEDURE — 25010000002 VANCOMYCIN 1.5 G RECONSTITUTED SOLUTION 1 EACH VIAL: Performed by: ORTHOPAEDIC SURGERY

## 2022-01-02 PROCEDURE — 63710000001 TRAZODONE 50 MG TABLET: Performed by: ORTHOPAEDIC SURGERY

## 2022-01-02 PROCEDURE — 63710000001 PREGABALIN 75 MG CAPSULE: Performed by: ORTHOPAEDIC SURGERY

## 2022-01-02 PROCEDURE — 93010 ELECTROCARDIOGRAM REPORT: CPT | Performed by: INTERNAL MEDICINE

## 2022-01-02 PROCEDURE — C1713 ANCHOR/SCREW BN/BN,TIS/BN: HCPCS | Performed by: ORTHOPAEDIC SURGERY

## 2022-01-02 PROCEDURE — 86900 BLOOD TYPING SEROLOGIC ABO: CPT

## 2022-01-02 PROCEDURE — 63710000001 ACETAMINOPHEN 500 MG TABLET: Performed by: ORTHOPAEDIC SURGERY

## 2022-01-02 PROCEDURE — 86850 RBC ANTIBODY SCREEN: CPT | Performed by: ORTHOPAEDIC SURGERY

## 2022-01-02 PROCEDURE — 99225 PR SBSQ OBSERVATION CARE/DAY 25 MINUTES: CPT | Performed by: INTERNAL MEDICINE

## 2022-01-02 PROCEDURE — 99204 OFFICE O/P NEW MOD 45 MIN: CPT | Performed by: ORTHOPAEDIC SURGERY

## 2022-01-02 PROCEDURE — G0378 HOSPITAL OBSERVATION PER HR: HCPCS

## 2022-01-02 PROCEDURE — C1889 IMPLANT/INSERT DEVICE, NOC: HCPCS | Performed by: ORTHOPAEDIC SURGERY

## 2022-01-02 PROCEDURE — 63710000001 INSULIN DETEMIR PER 5 UNITS: Performed by: ORTHOPAEDIC SURGERY

## 2022-01-02 PROCEDURE — 63710000001 BENZTROPINE 1 MG TABLET: Performed by: ORTHOPAEDIC SURGERY

## 2022-01-02 PROCEDURE — 94799 UNLISTED PULMONARY SVC/PX: CPT

## 2022-01-02 PROCEDURE — A9270 NON-COVERED ITEM OR SERVICE: HCPCS | Performed by: ORTHOPAEDIC SURGERY

## 2022-01-02 PROCEDURE — 94761 N-INVAS EAR/PLS OXIMETRY MLT: CPT

## 2022-01-02 PROCEDURE — 86900 BLOOD TYPING SEROLOGIC ABO: CPT | Performed by: ORTHOPAEDIC SURGERY

## 2022-01-02 PROCEDURE — 27829 TREAT LOWER LEG JOINT: CPT | Performed by: ORTHOPAEDIC SURGERY

## 2022-01-02 PROCEDURE — 27792 TREATMENT OF ANKLE FRACTURE: CPT | Performed by: ORTHOPAEDIC SURGERY

## 2022-01-02 PROCEDURE — 86901 BLOOD TYPING SEROLOGIC RH(D): CPT

## 2022-01-02 PROCEDURE — 82962 GLUCOSE BLOOD TEST: CPT

## 2022-01-02 PROCEDURE — 93005 ELECTROCARDIOGRAM TRACING: CPT | Performed by: INTERNAL MEDICINE

## 2022-01-02 PROCEDURE — 80053 COMPREHEN METABOLIC PANEL: CPT | Performed by: INTERNAL MEDICINE

## 2022-01-02 PROCEDURE — 85027 COMPLETE CBC AUTOMATED: CPT | Performed by: INTERNAL MEDICINE

## 2022-01-02 PROCEDURE — 25010000002 HYDROMORPHONE PER 4 MG: Performed by: INTERNAL MEDICINE

## 2022-01-02 PROCEDURE — 25010000002 ONDANSETRON PER 1 MG: Performed by: NURSE ANESTHETIST, CERTIFIED REGISTERED

## 2022-01-02 PROCEDURE — 63710000001 GABAPENTIN 300 MG CAPSULE: Performed by: ORTHOPAEDIC SURGERY

## 2022-01-02 PROCEDURE — 63710000001 POVIDONE-IODINE 10 % SOLUTION 118 ML BOTTLE: Performed by: ORTHOPAEDIC SURGERY

## 2022-01-02 PROCEDURE — 76000 FLUOROSCOPY <1 HR PHYS/QHP: CPT

## 2022-01-02 PROCEDURE — 25010000002 DEXAMETHASONE PER 1 MG: Performed by: NURSE ANESTHETIST, CERTIFIED REGISTERED

## 2022-01-02 PROCEDURE — 96376 TX/PRO/DX INJ SAME DRUG ADON: CPT

## 2022-01-02 PROCEDURE — 85610 PROTHROMBIN TIME: CPT | Performed by: ORTHOPAEDIC SURGERY

## 2022-01-02 PROCEDURE — 25010000002 MIDAZOLAM PER 1MG: Performed by: NURSE ANESTHETIST, CERTIFIED REGISTERED

## 2022-01-02 PROCEDURE — 63710000001 AMITRIPTYLINE 25 MG TABLET: Performed by: ORTHOPAEDIC SURGERY

## 2022-01-02 PROCEDURE — 71045 X-RAY EXAM CHEST 1 VIEW: CPT

## 2022-01-02 PROCEDURE — 63710000001 ROSUVASTATIN 5 MG TABLET: Performed by: ORTHOPAEDIC SURGERY

## 2022-01-02 PROCEDURE — 25010000002 HYDROMORPHONE PER 4 MG: Performed by: ORTHOPAEDIC SURGERY

## 2022-01-02 PROCEDURE — 63710000001 INSULIN DETEMIR PER 5 UNITS: Performed by: INTERNAL MEDICINE

## 2022-01-02 PROCEDURE — 86901 BLOOD TYPING SEROLOGIC RH(D): CPT | Performed by: ORTHOPAEDIC SURGERY

## 2022-01-02 PROCEDURE — 96375 TX/PRO/DX INJ NEW DRUG ADDON: CPT

## 2022-01-02 PROCEDURE — 25010000002 PROPOFOL 10 MG/ML EMULSION: Performed by: NURSE ANESTHETIST, CERTIFIED REGISTERED

## 2022-01-02 PROCEDURE — 84145 PROCALCITONIN (PCT): CPT | Performed by: INTERNAL MEDICINE

## 2022-01-02 PROCEDURE — 73610 X-RAY EXAM OF ANKLE: CPT

## 2022-01-02 PROCEDURE — 76942 ECHO GUIDE FOR BIOPSY: CPT | Performed by: ORTHOPAEDIC SURGERY

## 2022-01-02 PROCEDURE — 0 BUPIVACAINE LIPOSOME 1.3 % SUSPENSION: Performed by: ORTHOPAEDIC SURGERY

## 2022-01-02 DEVICE — SCRW CORT FUL/THRD S/TAP HEX/SM 2.7X16MM: Type: IMPLANTABLE DEVICE | Site: ANKLE | Status: FUNCTIONAL

## 2022-01-02 DEVICE — IMPLANTABLE DEVICE: Type: IMPLANTABLE DEVICE | Site: ANKLE | Status: FUNCTIONAL

## 2022-01-02 DEVICE — SCRW ULS LK 2.7X18MM: Type: IMPLANTABLE DEVICE | Site: ANKLE | Status: FUNCTIONAL

## 2022-01-02 DEVICE — SCRW ULS LK 2.7X16MM: Type: IMPLANTABLE DEVICE | Site: ANKLE | Status: FUNCTIONAL

## 2022-01-02 DEVICE — SYS SYNDESMOSIS REPR ANKL ZIPTIGHT W/TOGLOC: Type: IMPLANTABLE DEVICE | Site: ANKLE | Status: FUNCTIONAL

## 2022-01-02 DEVICE — SCRW PERIART LK HD2.7MM 3.5X18MM: Type: IMPLANTABLE DEVICE | Site: ANKLE | Status: FUNCTIONAL

## 2022-01-02 DEVICE — SCRW ULS LK 2.7X20MM: Type: IMPLANTABLE DEVICE | Site: ANKLE | Status: FUNCTIONAL

## 2022-01-02 DEVICE — SCRW PERIART S/TAP HD/2.7MM 3.5X18MM: Type: IMPLANTABLE DEVICE | Site: ANKLE | Status: FUNCTIONAL

## 2022-01-02 RX ORDER — MIDAZOLAM HYDROCHLORIDE 2 MG/2ML
0.5 INJECTION, SOLUTION INTRAMUSCULAR; INTRAVENOUS
Status: COMPLETED | OUTPATIENT
Start: 2022-01-02 | End: 2022-01-02

## 2022-01-02 RX ORDER — LIDOCAINE HYDROCHLORIDE 20 MG/ML
INJECTION, SOLUTION INFILTRATION; PERINEURAL AS NEEDED
Status: DISCONTINUED | OUTPATIENT
Start: 2022-01-02 | End: 2022-01-02 | Stop reason: SURG

## 2022-01-02 RX ORDER — ONDANSETRON 2 MG/ML
4 INJECTION INTRAMUSCULAR; INTRAVENOUS ONCE AS NEEDED
Status: DISCONTINUED | OUTPATIENT
Start: 2022-01-02 | End: 2022-01-02 | Stop reason: HOSPADM

## 2022-01-02 RX ORDER — DEXMEDETOMIDINE HYDROCHLORIDE 100 UG/ML
INJECTION, SOLUTION INTRAVENOUS AS NEEDED
Status: DISCONTINUED | OUTPATIENT
Start: 2022-01-02 | End: 2022-01-02 | Stop reason: SURG

## 2022-01-02 RX ORDER — BUPIVACAINE HYDROCHLORIDE 2.5 MG/ML
INJECTION, SOLUTION EPIDURAL; INFILTRATION; INTRACAUDAL
Status: COMPLETED | OUTPATIENT
Start: 2022-01-02 | End: 2022-01-02

## 2022-01-02 RX ORDER — PROPOFOL 10 MG/ML
VIAL (ML) INTRAVENOUS AS NEEDED
Status: DISCONTINUED | OUTPATIENT
Start: 2022-01-02 | End: 2022-01-02 | Stop reason: SURG

## 2022-01-02 RX ORDER — FAMOTIDINE 10 MG/ML
20 INJECTION, SOLUTION INTRAVENOUS
Status: COMPLETED | OUTPATIENT
Start: 2022-01-02 | End: 2022-01-02

## 2022-01-02 RX ORDER — LIDOCAINE HYDROCHLORIDE 10 MG/ML
0.5 INJECTION, SOLUTION EPIDURAL; INFILTRATION; INTRACAUDAL; PERINEURAL ONCE AS NEEDED
Status: DISCONTINUED | OUTPATIENT
Start: 2022-01-02 | End: 2022-01-02 | Stop reason: HOSPADM

## 2022-01-02 RX ORDER — OXYCODONE HYDROCHLORIDE AND ACETAMINOPHEN 5; 325 MG/1; MG/1
1 TABLET ORAL ONCE AS NEEDED
Status: DISCONTINUED | OUTPATIENT
Start: 2022-01-02 | End: 2022-01-02 | Stop reason: HOSPADM

## 2022-01-02 RX ORDER — ONDANSETRON 2 MG/ML
4 INJECTION INTRAMUSCULAR; INTRAVENOUS EVERY 6 HOURS PRN
Status: DISCONTINUED | OUTPATIENT
Start: 2022-01-02 | End: 2022-01-02 | Stop reason: SDUPTHER

## 2022-01-02 RX ORDER — SODIUM CHLORIDE, SODIUM LACTATE, POTASSIUM CHLORIDE, CALCIUM CHLORIDE 600; 310; 30; 20 MG/100ML; MG/100ML; MG/100ML; MG/100ML
9 INJECTION, SOLUTION INTRAVENOUS CONTINUOUS PRN
Status: DISCONTINUED | OUTPATIENT
Start: 2022-01-02 | End: 2022-01-02 | Stop reason: HOSPADM

## 2022-01-02 RX ORDER — PREGABALIN 75 MG/1
150 CAPSULE ORAL ONCE
Status: COMPLETED | OUTPATIENT
Start: 2022-01-02 | End: 2022-01-02

## 2022-01-02 RX ORDER — CLINDAMYCIN PHOSPHATE 600 MG/50ML
600 INJECTION INTRAVENOUS EVERY 8 HOURS
Status: DISCONTINUED | OUTPATIENT
Start: 2022-01-02 | End: 2022-01-03 | Stop reason: HOSPADM

## 2022-01-02 RX ORDER — BUPIVACAINE HYDROCHLORIDE 5 MG/ML
INJECTION, SOLUTION EPIDURAL; INTRACAUDAL
Status: COMPLETED | OUTPATIENT
Start: 2022-01-02 | End: 2022-01-02

## 2022-01-02 RX ORDER — NALOXONE HCL 0.4 MG/ML
0.4 VIAL (ML) INJECTION
Status: DISCONTINUED | OUTPATIENT
Start: 2022-01-02 | End: 2022-01-03 | Stop reason: HOSPADM

## 2022-01-02 RX ORDER — ONDANSETRON 2 MG/ML
4 INJECTION INTRAMUSCULAR; INTRAVENOUS ONCE AS NEEDED
Status: COMPLETED | OUTPATIENT
Start: 2022-01-02 | End: 2022-01-02

## 2022-01-02 RX ORDER — SODIUM CHLORIDE 0.9 % (FLUSH) 0.9 %
10 SYRINGE (ML) INJECTION EVERY 12 HOURS SCHEDULED
Status: DISCONTINUED | OUTPATIENT
Start: 2022-01-02 | End: 2022-01-02 | Stop reason: HOSPADM

## 2022-01-02 RX ORDER — FENTANYL CITRATE 50 UG/ML
50 INJECTION, SOLUTION INTRAMUSCULAR; INTRAVENOUS
Status: DISCONTINUED | OUTPATIENT
Start: 2022-01-02 | End: 2022-01-02 | Stop reason: HOSPADM

## 2022-01-02 RX ORDER — MAGNESIUM HYDROXIDE 1200 MG/15ML
LIQUID ORAL AS NEEDED
Status: DISCONTINUED | OUTPATIENT
Start: 2022-01-02 | End: 2022-01-02 | Stop reason: HOSPADM

## 2022-01-02 RX ORDER — HYDROMORPHONE HCL 110MG/55ML
2 PATIENT CONTROLLED ANALGESIA SYRINGE INTRAVENOUS
Status: DISCONTINUED | OUTPATIENT
Start: 2022-01-02 | End: 2022-01-03 | Stop reason: HOSPADM

## 2022-01-02 RX ORDER — SODIUM CHLORIDE 9 MG/ML
100 INJECTION, SOLUTION INTRAVENOUS CONTINUOUS
Status: DISCONTINUED | OUTPATIENT
Start: 2022-01-02 | End: 2022-01-02

## 2022-01-02 RX ORDER — HYDRALAZINE HYDROCHLORIDE 20 MG/ML
20 INJECTION INTRAMUSCULAR; INTRAVENOUS EVERY 6 HOURS PRN
Status: DISCONTINUED | OUTPATIENT
Start: 2022-01-02 | End: 2022-01-03 | Stop reason: HOSPADM

## 2022-01-02 RX ORDER — OXYCODONE AND ACETAMINOPHEN 7.5; 325 MG/1; MG/1
2 TABLET ORAL EVERY 4 HOURS PRN
Status: DISCONTINUED | OUTPATIENT
Start: 2022-01-02 | End: 2022-01-03 | Stop reason: HOSPADM

## 2022-01-02 RX ORDER — SODIUM CHLORIDE 9 MG/ML
40 INJECTION, SOLUTION INTRAVENOUS AS NEEDED
Status: DISCONTINUED | OUTPATIENT
Start: 2022-01-02 | End: 2022-01-02 | Stop reason: HOSPADM

## 2022-01-02 RX ORDER — OXYCODONE AND ACETAMINOPHEN 7.5; 325 MG/1; MG/1
1 TABLET ORAL EVERY 4 HOURS PRN
Status: DISCONTINUED | OUTPATIENT
Start: 2022-01-02 | End: 2022-01-03 | Stop reason: HOSPADM

## 2022-01-02 RX ORDER — CLINDAMYCIN PHOSPHATE 600 MG/50ML
600 INJECTION INTRAVENOUS EVERY 8 HOURS
Status: DISCONTINUED | OUTPATIENT
Start: 2022-01-02 | End: 2022-01-02

## 2022-01-02 RX ORDER — ACETAMINOPHEN 500 MG
1000 TABLET ORAL ONCE
Status: COMPLETED | OUTPATIENT
Start: 2022-01-02 | End: 2022-01-02

## 2022-01-02 RX ORDER — SODIUM CHLORIDE 0.9 % (FLUSH) 0.9 %
10 SYRINGE (ML) INJECTION AS NEEDED
Status: DISCONTINUED | OUTPATIENT
Start: 2022-01-02 | End: 2022-01-02 | Stop reason: HOSPADM

## 2022-01-02 RX ORDER — DEXAMETHASONE SODIUM PHOSPHATE 4 MG/ML
8 INJECTION, SOLUTION INTRA-ARTICULAR; INTRALESIONAL; INTRAMUSCULAR; INTRAVENOUS; SOFT TISSUE ONCE AS NEEDED
Status: COMPLETED | OUTPATIENT
Start: 2022-01-02 | End: 2022-01-02

## 2022-01-02 RX ADMIN — PROPOFOL 50 MG: 10 INJECTION, EMULSION INTRAVENOUS at 14:19

## 2022-01-02 RX ADMIN — VANCOMYCIN HYDROCHLORIDE 1500 MG: 1.5 INJECTION, POWDER, LYOPHILIZED, FOR SOLUTION INTRAVENOUS at 12:55

## 2022-01-02 RX ADMIN — BUDESONIDE AND FORMOTEROL FUMARATE DIHYDRATE 2 PUFF: 160; 4.5 AEROSOL RESPIRATORY (INHALATION) at 21:38

## 2022-01-02 RX ADMIN — MIDAZOLAM HYDROCHLORIDE 0.5 MG: 2 INJECTION, SOLUTION INTRAMUSCULAR; INTRAVENOUS at 13:53

## 2022-01-02 RX ADMIN — LIDOCAINE HYDROCHLORIDE 5 ML: 20 INJECTION, SOLUTION INFILTRATION; PERINEURAL at 13:46

## 2022-01-02 RX ADMIN — ONDANSETRON 4 MG: 2 INJECTION INTRAMUSCULAR; INTRAVENOUS at 13:34

## 2022-01-02 RX ADMIN — PROPOFOL 55 MCG/KG/MIN: 10 INJECTION, EMULSION INTRAVENOUS at 14:19

## 2022-01-02 RX ADMIN — BENZTROPINE MESYLATE 1 MG: 1 TABLET ORAL at 08:59

## 2022-01-02 RX ADMIN — INSULIN DETEMIR 10 UNITS: 100 INJECTION, SOLUTION SUBCUTANEOUS at 08:59

## 2022-01-02 RX ADMIN — CLINDAMYCIN IN 5 PERCENT DEXTROSE 600 MG: 12 INJECTION, SOLUTION INTRAVENOUS at 18:45

## 2022-01-02 RX ADMIN — TRAZODONE HYDROCHLORIDE 50 MG: 50 TABLET ORAL at 22:03

## 2022-01-02 RX ADMIN — DEXMEDETOMIDINE 50 MCG: 100 INJECTION, SOLUTION, CONCENTRATE INTRAVENOUS at 13:51

## 2022-01-02 RX ADMIN — KETAMINE HYDROCHLORIDE 30 MG: 10 INJECTION, SOLUTION INTRAMUSCULAR; INTRAVENOUS at 14:19

## 2022-01-02 RX ADMIN — BUPIVACAINE HYDROCHLORIDE 30 ML: 5 INJECTION, SOLUTION EPIDURAL; INTRACAUDAL; PERINEURAL at 13:51

## 2022-01-02 RX ADMIN — BENZTROPINE MESYLATE 1 MG: 1 TABLET ORAL at 22:03

## 2022-01-02 RX ADMIN — ROSUVASTATIN CALCIUM 5 MG: 5 TABLET, FILM COATED ORAL at 22:03

## 2022-01-02 RX ADMIN — FAMOTIDINE 20 MG: 10 INJECTION, SOLUTION INTRAVENOUS at 13:33

## 2022-01-02 RX ADMIN — DEXMEDETOMIDINE 50 MCG: 100 INJECTION, SOLUTION, CONCENTRATE INTRAVENOUS at 13:43

## 2022-01-02 RX ADMIN — HYDROMORPHONE HYDROCHLORIDE 2 MG: 2 INJECTION, SOLUTION INTRAMUSCULAR; INTRAVENOUS; SUBCUTANEOUS at 22:02

## 2022-01-02 RX ADMIN — OXYCODONE HYDROCHLORIDE AND ACETAMINOPHEN 1 TABLET: 7.5; 325 TABLET ORAL at 03:27

## 2022-01-02 RX ADMIN — INSULIN DETEMIR 10 UNITS: 100 INJECTION, SOLUTION SUBCUTANEOUS at 22:03

## 2022-01-02 RX ADMIN — SODIUM CHLORIDE 100 ML/HR: 9 INJECTION, SOLUTION INTRAVENOUS at 07:37

## 2022-01-02 RX ADMIN — LIDOCAINE HYDROCHLORIDE 2 ML: 20 INJECTION, SOLUTION INFILTRATION; PERINEURAL at 13:40

## 2022-01-02 RX ADMIN — OXYCODONE HYDROCHLORIDE AND ACETAMINOPHEN 1 TABLET: 7.5; 325 TABLET ORAL at 08:59

## 2022-01-02 RX ADMIN — SODIUM CHLORIDE, PRESERVATIVE FREE 10 ML: 5 INJECTION INTRAVENOUS at 22:03

## 2022-01-02 RX ADMIN — SODIUM CHLORIDE, POTASSIUM CHLORIDE, SODIUM LACTATE AND CALCIUM CHLORIDE 9 ML/HR: 600; 310; 30; 20 INJECTION, SOLUTION INTRAVENOUS at 13:35

## 2022-01-02 RX ADMIN — KETAMINE HYDROCHLORIDE 20 MG: 10 INJECTION, SOLUTION INTRAMUSCULAR; INTRAVENOUS at 13:40

## 2022-01-02 RX ADMIN — GABAPENTIN 600 MG: 300 CAPSULE ORAL at 08:59

## 2022-01-02 RX ADMIN — ALBUTEROL SULFATE 2.5 MG: 2.5 SOLUTION RESPIRATORY (INHALATION) at 21:30

## 2022-01-02 RX ADMIN — DEXAMETHASONE SODIUM PHOSPHATE 8 MG: 4 INJECTION, SOLUTION INTRAMUSCULAR; INTRAVENOUS at 13:33

## 2022-01-02 RX ADMIN — BUPIVACAINE HYDROCHLORIDE 20 ML: 2.5 INJECTION, SOLUTION EPIDURAL; INFILTRATION; INTRACAUDAL; PERINEURAL at 13:43

## 2022-01-02 RX ADMIN — ALBUTEROL SULFATE 2.5 MG: 2.5 SOLUTION RESPIRATORY (INHALATION) at 08:42

## 2022-01-02 RX ADMIN — GABAPENTIN 600 MG: 300 CAPSULE ORAL at 22:03

## 2022-01-02 RX ADMIN — PREGABALIN 150 MG: 75 CAPSULE ORAL at 12:57

## 2022-01-02 RX ADMIN — CLINDAMYCIN IN 5 PERCENT DEXTROSE 600 MG: 12 INJECTION, SOLUTION INTRAVENOUS at 10:47

## 2022-01-02 RX ADMIN — ACETAMINOPHEN 1000 MG: 500 TABLET ORAL at 12:56

## 2022-01-02 RX ADMIN — HYDROMORPHONE HYDROCHLORIDE 2 MG: 2 INJECTION, SOLUTION INTRAMUSCULAR; INTRAVENOUS; SUBCUTANEOUS at 12:00

## 2022-01-02 RX ADMIN — MIDAZOLAM HYDROCHLORIDE 0.5 MG: 2 INJECTION, SOLUTION INTRAMUSCULAR; INTRAVENOUS at 13:38

## 2022-01-02 RX ADMIN — BUDESONIDE AND FORMOTEROL FUMARATE DIHYDRATE 2 PUFF: 160; 4.5 AEROSOL RESPIRATORY (INHALATION) at 08:51

## 2022-01-02 RX ADMIN — AMITRIPTYLINE HYDROCHLORIDE 25 MG: 25 TABLET, FILM COATED ORAL at 22:02

## 2022-01-02 RX ADMIN — HYDROMORPHONE HYDROCHLORIDE 0.5 MG: 2 INJECTION, SOLUTION INTRAMUSCULAR; INTRAVENOUS; SUBCUTANEOUS at 10:45

## 2022-01-02 RX ADMIN — HYDROMORPHONE HYDROCHLORIDE 0.5 MG: 2 INJECTION, SOLUTION INTRAMUSCULAR; INTRAVENOUS; SUBCUTANEOUS at 07:56

## 2022-01-02 NOTE — ANESTHESIA PREPROCEDURE EVALUATION
Anesthesia Evaluation     Patient summary reviewed and Nursing notes reviewed   no history of anesthetic complications:  NPO Solid Status: > 8 hours  NPO Liquid Status: > 8 hours           Airway   Mallampati: II  TM distance: <3 FB  Neck ROM: full  No difficulty expected  Dental - normal exam         Pulmonary - normal exam   (+) COPD moderate, asthma,sleep apnea,   (-) shortness of breath  Cardiovascular - normal exam  Exercise tolerance: poor (<4 METS)    ECG reviewed    (+) DVT resolved, hyperlipidemia,   (-) angina      Neuro/Psych  (+) psychiatric history Anxiety, Depression and Bipolar,     GI/Hepatic/Renal/Endo    (+)   diabetes mellitus type 2,     Musculoskeletal     Abdominal   (+) obese,    Substance History   (+) alcohol use,      OB/GYN negative ob/gyn ROS         Other   arthritis,                      Anesthesia Plan    ASA 3 - emergent     general with block     intravenous induction     Anesthetic plan, all risks, benefits, and alternatives have been provided, discussed and informed consent has been obtained with: patient.  Use of blood products discussed with patient  Consented to blood products.

## 2022-01-02 NOTE — ANESTHESIA PROCEDURE NOTES
Peripheral Block    Pre-sedation assessment completed: 1/2/2022 1:38 PM    Patient reassessed immediately prior to procedure    Patient location during procedure: pre-op  Start time: 1/2/2022 1:40 PM  Stop time: 1/2/2022 1:43 PM  Reason for block: at surgeon's request and post-op pain management  Performed by  CRNA: Juaquin Bah CRNA  Preanesthetic Checklist  Completed: patient identified, IV checked, site marked, risks and benefits discussed, surgical consent, monitors and equipment checked, pre-op evaluation and timeout performed  Prep:  Pt Position: supine  Sterile barriers:cap, gloves, gown, mask and sterile barriers  Prep: ChloraPrep  Patient monitoring: blood pressure monitoring, continuous pulse oximetry and EKG  Procedure    Sedation: yes  Performed under: local infiltration  Guidance:ultrasound guided    ULTRASOUND INTERPRETATION. Using ultrasound guidance a 21 G gauge needle was placed in close proximity to the nerve, at which point, under ultrasound guidance anesthetic was injected in the area of the nerve and spread of the anesthesia was seen on ultrasound in close proximity thereto.  There were no abnormalities seen on ultrasound; a digital image was taken; and the patient tolerated the procedure with no complications. Images:still images obtained, printed/placed on chart    Laterality:right  Block Type:adductor canal block  Injection Technique:single-shotNeedle Gauge:21 G  Resistance on Injection: none    Medications Used: bupivacaine PF (MARCAINE) injection 0.25%, 20 mL  Med administered at 1/2/2022 1:43 PM      Medications  Comment:50 dexmedetomidine     Post Assessment  Injection Assessment: negative aspiration for heme, no paresthesia on injection and incremental injection  Patient Tolerance:comfortable throughout block  Complications:no

## 2022-01-02 NOTE — ANESTHESIA PROCEDURE NOTES
Peripheral Block    Pre-sedation assessment completed: 1/2/2022 1:38 PM    Patient reassessed immediately prior to procedure    Patient location during procedure: pre-op  Start time: 1/2/2022 1:46 PM  Stop time: 1/2/2022 1:51 PM  Reason for block: at surgeon's request and post-op pain management  Performed by  CRNA: Juaquin Bah CRNA  Preanesthetic Checklist  Completed: patient identified, IV checked, site marked, risks and benefits discussed, surgical consent, monitors and equipment checked, pre-op evaluation and timeout performed  Prep:  Pt Position: left lateral decubitus  Sterile barriers:cap, gloves, gown, mask and sterile barriers  Prep: ChloraPrep  Patient monitoring: blood pressure monitoring, continuous pulse oximetry and EKG  Procedure    Sedation: yes  Performed under: local infiltration  Guidance:ultrasound guided    ULTRASOUND INTERPRETATION. Using ultrasound guidance a 21 G gauge needle was placed in close proximity to the nerve, at which point, under ultrasound guidance anesthetic was injected in the area of the nerve and spread of the anesthesia was seen on ultrasound in close proximity thereto.  There were no abnormalities seen on ultrasound; a digital image was taken; and the patient tolerated the procedure with no complications. Images:still images obtained, printed/placed on chart  Loss of twitch: 0.5 mA  Laterality:right  Block Type:popliteal  Injection Technique:single-shot  Needle Type:echogenic  Needle Gauge:21 G  Resistance on Injection: none    Medications Used: bupivacaine PF (MARCAINE) injection 0.5%, 30 mL  Med administered at 1/2/2022 1:51 PM      Medications  Comment:50dexmedetomidine     Post Assessment  Injection Assessment: negative aspiration for heme, no paresthesia on injection and incremental injection  Patient Tolerance:comfortable throughout block  Complications:no

## 2022-01-02 NOTE — ANESTHESIA POSTPROCEDURE EVALUATION
Patient: Richard Kim    Procedure Summary     Date: 01/02/22 Room / Location:  [facility] /  [facility]    Anesthesia Start: 1413 Anesthesia Stop: 1619    Procedure: ANKLE OPEN REDUCTION INTERNAL FIXATION (Right Ankle) Diagnosis:       Closed fracture of distal end of right fibula, unspecified fracture morphology, initial encounter      Closed fracture of distal end of right tibia, unspecified fracture morphology, initial encounter      (Closed fracture of distal end of right fibula, unspecified fracture morphology, initial encounter [S82.831A])      (Closed fracture of distal end of right tibia, unspecified fracture morphology, initial encounter [S82.301A])    Surgeons: Zeferino Montgomery MD Provider: Juaquin Bah CRNA    Anesthesia Type: general with block ASA Status: 3 - Emergent          Anesthesia Type: general with block    Vitals  Vitals Value Taken Time   /96 01/02/22 1655   Temp 97.3 °F (36.3 °C) 01/02/22 1616   Pulse 86 01/02/22 1659   Resp 19 01/02/22 1655   SpO2 93 % 01/02/22 1659   Vitals shown include unvalidated device data.        Post Anesthesia Care and Evaluation    Patient location during evaluation: bedside  Patient participation: complete - patient participated  Level of consciousness: awake and alert  Pain score: 0  Pain management: adequate  Airway patency: patent  Anesthetic complications: No anesthetic complications  PONV Status: none  Cardiovascular status: acceptable  Respiratory status: acceptable  Hydration status: acceptable  No anesthesia care post op

## 2022-01-03 VITALS
RESPIRATION RATE: 18 BRPM | TEMPERATURE: 97.5 F | DIASTOLIC BLOOD PRESSURE: 70 MMHG | HEART RATE: 95 BPM | HEIGHT: 69 IN | OXYGEN SATURATION: 96 % | SYSTOLIC BLOOD PRESSURE: 137 MMHG | WEIGHT: 210 LBS | BODY MASS INDEX: 31.1 KG/M2

## 2022-01-03 LAB
ANION GAP SERPL CALCULATED.3IONS-SCNC: 13.6 MMOL/L (ref 5–15)
BUN SERPL-MCNC: 10 MG/DL (ref 6–20)
BUN/CREAT SERPL: 12.3 (ref 7–25)
CALCIUM SPEC-SCNC: 9 MG/DL (ref 8.6–10.5)
CHLORIDE SERPL-SCNC: 94 MMOL/L (ref 98–107)
CO2 SERPL-SCNC: 23.4 MMOL/L (ref 22–29)
CREAT SERPL-MCNC: 0.81 MG/DL (ref 0.76–1.27)
DEPRECATED RDW RBC AUTO: 40.9 FL (ref 37–54)
ERYTHROCYTE [DISTWIDTH] IN BLOOD BY AUTOMATED COUNT: 11.9 % (ref 12.3–15.4)
GFR SERPL CREATININE-BSD FRML MDRD: 98 ML/MIN/1.73
GLUCOSE BLDC GLUCOMTR-MCNC: 189 MG/DL (ref 70–130)
GLUCOSE BLDC GLUCOMTR-MCNC: 211 MG/DL (ref 70–130)
GLUCOSE SERPL-MCNC: 223 MG/DL (ref 65–99)
HCT VFR BLD AUTO: 42.3 % (ref 37.5–51)
HGB BLD-MCNC: 14 G/DL (ref 13–17.7)
MCH RBC QN AUTO: 31 PG (ref 26.6–33)
MCHC RBC AUTO-ENTMCNC: 33.1 G/DL (ref 31.5–35.7)
MCV RBC AUTO: 93.8 FL (ref 79–97)
PLATELET # BLD AUTO: 205 10*3/MM3 (ref 140–450)
PMV BLD AUTO: 10.1 FL (ref 6–12)
POTASSIUM SERPL-SCNC: 4.2 MMOL/L (ref 3.5–5.2)
PROCALCITONIN SERPL-MCNC: 0.32 NG/ML (ref 0–0.25)
QT INTERVAL: 349 MS
RBC # BLD AUTO: 4.51 10*6/MM3 (ref 4.14–5.8)
SODIUM SERPL-SCNC: 131 MMOL/L (ref 136–145)
WBC NRBC COR # BLD: 12.01 10*3/MM3 (ref 3.4–10.8)

## 2022-01-03 PROCEDURE — 82962 GLUCOSE BLOOD TEST: CPT

## 2022-01-03 PROCEDURE — 25010000002 VANCOMYCIN PER 500 MG

## 2022-01-03 PROCEDURE — G0378 HOSPITAL OBSERVATION PER HR: HCPCS

## 2022-01-03 PROCEDURE — 63710000001 GABAPENTIN 300 MG CAPSULE: Performed by: ORTHOPAEDIC SURGERY

## 2022-01-03 PROCEDURE — 80048 BASIC METABOLIC PNL TOTAL CA: CPT | Performed by: ORTHOPAEDIC SURGERY

## 2022-01-03 PROCEDURE — A9270 NON-COVERED ITEM OR SERVICE: HCPCS | Performed by: ORTHOPAEDIC SURGERY

## 2022-01-03 PROCEDURE — 63710000001 OXYCODONE-ACETAMINOPHEN 7.5-325 MG TABLET: Performed by: ORTHOPAEDIC SURGERY

## 2022-01-03 PROCEDURE — 25010000002 HYDROMORPHONE PER 4 MG: Performed by: ORTHOPAEDIC SURGERY

## 2022-01-03 PROCEDURE — 94761 N-INVAS EAR/PLS OXIMETRY MLT: CPT

## 2022-01-03 PROCEDURE — 84145 PROCALCITONIN (PCT): CPT | Performed by: NURSE PRACTITIONER

## 2022-01-03 PROCEDURE — 85027 COMPLETE CBC AUTOMATED: CPT | Performed by: ORTHOPAEDIC SURGERY

## 2022-01-03 PROCEDURE — 63710000001 INSULIN DETEMIR PER 5 UNITS: Performed by: ORTHOPAEDIC SURGERY

## 2022-01-03 PROCEDURE — 63710000001 INSULIN ASPART PER 5 UNITS: Performed by: ORTHOPAEDIC SURGERY

## 2022-01-03 PROCEDURE — 97161 PT EVAL LOW COMPLEX 20 MIN: CPT

## 2022-01-03 PROCEDURE — 63710000001 BENZTROPINE 1 MG TABLET: Performed by: ORTHOPAEDIC SURGERY

## 2022-01-03 PROCEDURE — 99217 PR OBSERVATION CARE DISCHARGE MANAGEMENT: CPT | Performed by: NURSE PRACTITIONER

## 2022-01-03 PROCEDURE — 94799 UNLISTED PULMONARY SVC/PX: CPT

## 2022-01-03 PROCEDURE — 63710000001 APIXABAN 2.5 MG TABLET: Performed by: ORTHOPAEDIC SURGERY

## 2022-01-03 RX ORDER — CLINDAMYCIN HYDROCHLORIDE 300 MG/1
300 CAPSULE ORAL 3 TIMES DAILY
Qty: 15 CAPSULE | Refills: 0 | Status: SHIPPED | OUTPATIENT
Start: 2022-01-03 | End: 2022-01-08

## 2022-01-03 RX ORDER — VANCOMYCIN HYDROCHLORIDE 500 MG/10ML
INJECTION, POWDER, LYOPHILIZED, FOR SOLUTION INTRAVENOUS
Status: DISCONTINUED
Start: 2022-01-03 | End: 2022-01-03 | Stop reason: HOSPADM

## 2022-01-03 RX ORDER — OXYCODONE AND ACETAMINOPHEN 7.5; 325 MG/1; MG/1
TABLET ORAL
Qty: 36 TABLET | Refills: 0 | Status: SHIPPED | OUTPATIENT
Start: 2022-01-03 | End: 2022-01-12 | Stop reason: SDUPTHER

## 2022-01-03 RX ORDER — ACETAMINOPHEN 500 MG
1 TABLET ORAL DAILY
Qty: 30 CAPSULE | Refills: 0 | Status: SHIPPED | OUTPATIENT
Start: 2022-01-03

## 2022-01-03 RX ORDER — SODIUM CHLORIDE 9 MG/ML
INJECTION, SOLUTION INTRAVENOUS
Status: COMPLETED
Start: 2022-01-03 | End: 2022-01-03

## 2022-01-03 RX ADMIN — GABAPENTIN 600 MG: 300 CAPSULE ORAL at 08:33

## 2022-01-03 RX ADMIN — BUDESONIDE AND FORMOTEROL FUMARATE DIHYDRATE 2 PUFF: 160; 4.5 AEROSOL RESPIRATORY (INHALATION) at 09:33

## 2022-01-03 RX ADMIN — SODIUM CHLORIDE 500 ML: 9 INJECTION, SOLUTION INTRAVENOUS at 02:50

## 2022-01-03 RX ADMIN — CLINDAMYCIN IN 5 PERCENT DEXTROSE 600 MG: 12 INJECTION, SOLUTION INTRAVENOUS at 09:45

## 2022-01-03 RX ADMIN — BENZTROPINE MESYLATE 1 MG: 1 TABLET ORAL at 08:33

## 2022-01-03 RX ADMIN — VANCOMYCIN HYDROCHLORIDE 1500 MG: 1 INJECTION, POWDER, LYOPHILIZED, FOR SOLUTION INTRAVENOUS at 02:50

## 2022-01-03 RX ADMIN — OXYCODONE HYDROCHLORIDE AND ACETAMINOPHEN 2 TABLET: 7.5; 325 TABLET ORAL at 06:31

## 2022-01-03 RX ADMIN — APIXABAN 2.5 MG: 2.5 TABLET, FILM COATED ORAL at 08:33

## 2022-01-03 RX ADMIN — HYDROMORPHONE HYDROCHLORIDE 2 MG: 2 INJECTION, SOLUTION INTRAMUSCULAR; INTRAVENOUS; SUBCUTANEOUS at 03:00

## 2022-01-03 RX ADMIN — INSULIN DETEMIR 10 UNITS: 100 INJECTION, SOLUTION SUBCUTANEOUS at 08:42

## 2022-01-03 RX ADMIN — INSULIN ASPART 2 UNITS: 100 INJECTION, SOLUTION INTRAVENOUS; SUBCUTANEOUS at 08:33

## 2022-01-03 RX ADMIN — CLINDAMYCIN IN 5 PERCENT DEXTROSE 600 MG: 12 INJECTION, SOLUTION INTRAVENOUS at 01:24

## 2022-01-03 RX ADMIN — SODIUM CHLORIDE, PRESERVATIVE FREE 10 ML: 5 INJECTION INTRAVENOUS at 09:45

## 2022-01-04 ENCOUNTER — READMISSION MANAGEMENT (OUTPATIENT)
Dept: CALL CENTER | Facility: HOSPITAL | Age: 57
End: 2022-01-04

## 2022-01-04 NOTE — OUTREACH NOTE
Prep Survey      Responses   Holiness facility patient discharged from? [place]   Is LACE score < 7 ? Yes   Emergency Room discharge w/ pulse ox? No   Eligibility Readm Mgmt   Discharge diagnosis S/p ORIF right bimalleolar ankle fracture, dislocation with fixation of lateral malleolus only, POD 1   Does the patient have one of the following disease processes/diagnoses(primary or secondary)? General Surgery   Does the patient have Home health ordered? No   Is there a DME ordered? Yes   What DME was ordered? [facility] [place] [facility]    Prep survey completed? Yes          Giovanna Harkins RN

## 2022-01-12 DIAGNOSIS — Z87.81 STATUS POST OPEN REDUCTION WITH INTERNAL FIXATION (ORIF) OF FRACTURE OF ANKLE: ICD-10-CM

## 2022-01-12 DIAGNOSIS — Z98.890 STATUS POST OPEN REDUCTION WITH INTERNAL FIXATION (ORIF) OF FRACTURE OF ANKLE: ICD-10-CM

## 2022-01-12 DIAGNOSIS — Z98.890 STATUS POST OPEN REDUCTION WITH INTERNAL FIXATION (ORIF) OF FRACTURE OF ANKLE: Primary | ICD-10-CM

## 2022-01-12 DIAGNOSIS — Z87.81 STATUS POST OPEN REDUCTION WITH INTERNAL FIXATION (ORIF) OF FRACTURE OF ANKLE: Primary | ICD-10-CM

## 2022-01-12 RX ORDER — OXYCODONE AND ACETAMINOPHEN 7.5; 325 MG/1; MG/1
TABLET ORAL
Qty: 36 TABLET | Refills: 0 | Status: SHIPPED | OUTPATIENT
Start: 2022-01-12 | End: 2022-01-17

## 2022-01-12 RX ORDER — OXYCODONE HYDROCHLORIDE AND ACETAMINOPHEN 5; 325 MG/1; MG/1
1 TABLET ORAL EVERY 4 HOURS PRN
Qty: 42 TABLET | Refills: 0 | Status: SHIPPED | OUTPATIENT
Start: 2022-01-12 | End: 2022-01-21 | Stop reason: SDUPTHER

## 2022-01-12 NOTE — TELEPHONE ENCOUNTER
Patient's emergency contact calling to a request for an RX refill of pain medication he last filled Oxycodone-acetaminophen (PERCOCET) 7.5-325- Take 1-2 tablets by mouth every 4-6 hours PRN. # 36 Filled on 01.03.2022     DX: /p right ORIF bimalleolar ankle fracture dislocation with fixation of lateral malleolus only  Open reduction internal fixation right ankle syndesmotic disruption 01.02.2022       Previous RX pended for your approval, change or denial.     Please advise.

## 2022-01-17 ENCOUNTER — OFFICE VISIT (OUTPATIENT)
Dept: ORTHOPEDIC SURGERY | Facility: CLINIC | Age: 57
End: 2022-01-17

## 2022-01-17 VITALS — HEIGHT: 69 IN | BODY MASS INDEX: 31.1 KG/M2 | WEIGHT: 210 LBS

## 2022-01-17 DIAGNOSIS — Z98.890 STATUS POST OPEN REDUCTION WITH INTERNAL FIXATION (ORIF) OF FRACTURE OF ANKLE: Primary | ICD-10-CM

## 2022-01-17 DIAGNOSIS — Z87.81 STATUS POST OPEN REDUCTION WITH INTERNAL FIXATION (ORIF) OF FRACTURE OF ANKLE: Primary | ICD-10-CM

## 2022-01-17 PROCEDURE — 73610 X-RAY EXAM OF ANKLE: CPT | Performed by: NURSE PRACTITIONER

## 2022-01-17 PROCEDURE — 99024 POSTOP FOLLOW-UP VISIT: CPT | Performed by: NURSE PRACTITIONER

## 2022-01-17 NOTE — PROGRESS NOTES
CC: s/p right ORIF bimalleolar ankle fracture dislocation with fixation of lateral malleolus only  Open reduction internal fixation right ankle syndesmotic disruption    Interval history: Richard Kim returns to clinic for follow-up of his right lower extremity.  Is continued nonweightbearing right lower extremity denies any presence of numbness or tingling right lower extremity.  Is continue with active range of motion of all digits right foot.  Has continued Eliquis for DVT prophylaxis, denies any calf tenderness.    Exam:  Right lower extremity examined out of splint  Sutures clean dry and intact, mild to moderate swelling with medial and lateral aspect of the ankle  2+ dorsalis pedis pulse  Flex/extend all digits right foot  Brisk cap refill all digits right foot  All compartments soft easily compressible  Negative calf tenderness, negative Homans' sign  Minimal erythema along the lateral aspect of the ankle, skin irritation no evidence of any infection no drainage present  Fracture blister the medial aspect of the ankle, scabbed over no drainage present negative erythema no warmth appreciated    Imaging:  Right Ankle X-Ray  Indication: Status post open reduction internal fixation  bimalleolar ankle fracture dislocation with fixation of lateral malleolus only  Open reduction internal fixation right ankle syndesmotic disruption    Views: AP, Lateral, Mortise  Findings:  Hardware remains intact with anatomical alignment no significant callus appreciated on imaging x-ray imaging compared to the immediate postop film  prior studies available for comparison.    Assessment: Status post  right ORIF bimalleolar ankle fracture dislocation with fixation of lateral malleolus only  Open reduction internal fixation right ankle syndesmotic disruption    Plan:  1.  Discussed plan of care with the patient and family.  Strict nonweightbearing the right lower extremity.  Sutures removed Steri-Strips were placed short leg cast  applied.  We will plan to see him back in clinic in 2 weeks repeat x-ray images out of cast at follow-up.  We will likely transition to boot still nonweightbearing but we will plan to proceed with active range of motion of the ankle.  Continue with elevation as needed for swelling.  Discussed if he is experiencing any pain at the heel we will see him immediately.  Heel and Achilles well-padded prior to application of cast.  Continue with Eliquis twice daily x2 weeks all questions answered.    New Medications Ordered This Visit   Medications   • apixaban (ELIQUIS) 2.5 MG tablet tablet     Sig: Take 1 tablet by mouth Every 12 (Twelve) Hours. Indications: Prophylaxis of Venous Thromboembolism     Dispense:  30 tablet     Refill:  0

## 2022-01-21 DIAGNOSIS — Z98.890 STATUS POST OPEN REDUCTION WITH INTERNAL FIXATION (ORIF) OF FRACTURE OF ANKLE: ICD-10-CM

## 2022-01-21 DIAGNOSIS — Z87.81 STATUS POST OPEN REDUCTION WITH INTERNAL FIXATION (ORIF) OF FRACTURE OF ANKLE: ICD-10-CM

## 2022-01-21 RX ORDER — OXYCODONE HYDROCHLORIDE AND ACETAMINOPHEN 5; 325 MG/1; MG/1
TABLET ORAL
Qty: 36 TABLET | Refills: 0 | Status: SHIPPED | OUTPATIENT
Start: 2022-01-21 | End: 2022-01-28 | Stop reason: SDUPTHER

## 2022-01-21 RX ORDER — OXYCODONE HYDROCHLORIDE AND ACETAMINOPHEN 5; 325 MG/1; MG/1
1 TABLET ORAL EVERY 4 HOURS PRN
Qty: 42 TABLET | Refills: 0 | Status: CANCELLED | OUTPATIENT
Start: 2022-01-21

## 2022-01-21 NOTE — TELEPHONE ENCOUNTER
Rx Refill Note  Requested Prescriptions     Pending Prescriptions Disp Refills   • oxyCODONE-acetaminophen (PERCOCET) 5-325 MG per tablet 42 tablet 0     Sig: Take 1 tablet by mouth Every 4 (Four) Hours As Needed for Moderate Pain  or Severe Pain .      Last office visit with prescribing clinician: 01.17.2022   Next office visit with prescribing clinician: 02.01.2022       Encounter Diagnosis   Name Primary?   • Status post open reduction with internal fixation (ORIF) of fracture of ankle      01.02.2021.    Last Filled:01.12.2022    Sabrina Espino MA  01/21/22, 13:46 EST    Previous RX pended for your approval, change or denial.     {TIP  Encounters:    {TIP  Please add Last Relevant Lab Date if appropriate:  {TIP  Is Refill Pharmacy correct?:    Upon speaking to the patient's girlfriend she states that the patient has not been taking his Eliquis as he is out of it and [facility] would not fill it- called [facility] who checked there system and did a complete med count and found no shortages (per there system show it was autofilled and not opened from the factory) they had a full fill of 60 tablets on 01.04.2022 for a 1 month supply- when I called back and spoke with the girlfriend she went to verify the medication names via his medication bottles and found the full bottle of the medication she report she was going to have the patient take the medication immediately and resume q 12 hrs as directed. (I did verify he has no new complaint of increased calf tenderness or pain or shortness increased shortness of breath.)

## 2022-01-28 DIAGNOSIS — Z98.890 STATUS POST OPEN REDUCTION WITH INTERNAL FIXATION (ORIF) OF FRACTURE OF ANKLE: ICD-10-CM

## 2022-01-28 DIAGNOSIS — Z87.81 STATUS POST OPEN REDUCTION WITH INTERNAL FIXATION (ORIF) OF FRACTURE OF ANKLE: ICD-10-CM

## 2022-01-28 RX ORDER — OXYCODONE HYDROCHLORIDE AND ACETAMINOPHEN 5; 325 MG/1; MG/1
TABLET ORAL
Qty: 36 TABLET | Refills: 0 | Status: SHIPPED | OUTPATIENT
Start: 2022-01-28 | End: 2022-02-04 | Stop reason: SDUPTHER

## 2022-01-28 NOTE — TELEPHONE ENCOUNTER
Rx Refill Note  Requested Prescriptions     Pending Prescriptions Disp Refills   • oxyCODONE-acetaminophen (PERCOCET) 5-325 MG per tablet 36 tablet 0     Si-2 tablets every 4-6 hours, prn moderate-severe pain      Last office visit with prescribing clinician: Visit date not found      Next office visit with prescribing clinician: Visit date not found     Encounter Diagnosis   Name Primary?   • Status post open reduction with internal fixation (ORIF) of fracture of ankle     22    Last Filled: 22    Antonina Bowens MA  22, 10:29 EST    Previous RX pended for your approval, change or denial.     {TIP  Encounters:    {TIP  Please add Last Relevant Lab Date if appropriate:  {TIP  Is Refill Pharmacy correct?:

## 2022-02-01 ENCOUNTER — OFFICE VISIT (OUTPATIENT)
Dept: ORTHOPEDIC SURGERY | Facility: CLINIC | Age: 57
End: 2022-02-01

## 2022-02-01 VITALS — BODY MASS INDEX: 31.1 KG/M2 | WEIGHT: 210 LBS | HEIGHT: 69 IN

## 2022-02-01 DIAGNOSIS — Z98.890 STATUS POST OPEN REDUCTION WITH INTERNAL FIXATION (ORIF) OF FRACTURE OF ANKLE: Primary | ICD-10-CM

## 2022-02-01 DIAGNOSIS — Z87.81 STATUS POST OPEN REDUCTION WITH INTERNAL FIXATION (ORIF) OF FRACTURE OF ANKLE: Primary | ICD-10-CM

## 2022-02-01 PROCEDURE — 99024 POSTOP FOLLOW-UP VISIT: CPT | Performed by: NURSE PRACTITIONER

## 2022-02-01 PROCEDURE — 73610 X-RAY EXAM OF ANKLE: CPT | Performed by: NURSE PRACTITIONER

## 2022-02-01 NOTE — PROGRESS NOTES
CC: s/p right ORIF bimalleolar ankle fracture dislocation with fixation of lateral malleolus only  Open reduction internal fixation right ankle syndesmotic disruption, DOS 01/02/2022    Interval history: Richard Kim returns to clinic for follow-up of his right lower extremity.  Is continued with cast tolerating well nonweightbearing right lower extremity.  He has noted some sharp pain from time to time in the lateral aspect of the ankle but otherwise improving.  Is continued with toe range of motion denies any presence of numbness or tingling at the right lower extremity.  Denies all other concerns present.    Exam:  Right lower extremity examined  Incisions continue to heal at the medial lateral aspect of the ankle  Flex/extend all digits right foot  2+ dorsalis pedis pulse  Abrasion noted to the great toe appears cast rubbing abrasion good blood flow no evidence of any infection  Mild to moderate swelling of the ankle  Plantarflexion 15 degrees, dorsiflexion approximately 25 degrees  Negative calf tenderness, negative Homans' sign  Brisk cap refill all digits right foot all compartments soft and easily compressible    Imaging:  Right Ankle X-Ray  Indication: Status post open reduction internal fixation bimalleolar ankle fracture dislocation fixation of lateral malleolus only, open reduction internal fixation right ankle syndesmotic disruption  Views: AP, Lateral, Mortise  Findings:  Hardware intact anatomical alignment appreciated, mild callus noted fibula mild soft tissue swelling    prior studies available for comparison.    Assessment: Status post  right ORIF bimalleolar ankle fracture dislocation with fixation of lateral malleolus only  Open reduction internal fixation right ankle syndesmotic disruption    Plan:  1.  Discussed plan of care with patient.  Strict nonweightbearing right lower extremity discussed if he bears weight at the right lower extremity the hardware will fail and fracture will not  improve.  He verbalized understanding of all information.  2.  Tall fracture boot applied right lower extremity again strict nonweightbearing while out of the boot he is able to plantarflex and dorsiflex.  Discussed that the incisions have healed he is now able to submerge down into water which he can actually work on the plantar flexion and dorsiflexion of the right lower extremity.  We will plan to see him back in clinic in 2 weeks x-ray images right ankle out of boot to be completed.  He verbalized understanding of all information and agrees with plan of care.  Denies other concerns present this time.    No orders of the defined types were placed in this encounter.

## 2022-02-02 ENCOUNTER — TELEPHONE (OUTPATIENT)
Dept: ORTHOPEDIC SURGERY | Facility: CLINIC | Age: 57
End: 2022-02-02

## 2022-02-02 NOTE — TELEPHONE ENCOUNTER
Patient's girlfriend calling stating that Right ankle and leg are killing him- she does confirm he is taking his Eliquis blood thinner. They report the pain as a thumping/ throbbing pain. Patient was advised to be NON-Weightbearing and encouraged to rest/ ICE and elevate his leg above the level of his heart, ice for 20 minutes on every 2 hours.     No complaint of calf tenderness or pain.     s/p right ORIF bimalleolar ankle fracture dislocation with fixation of lateral malleolus only  Open reduction internal fixation right ankle syndesmotic disruption, DOS 01/02/2022

## 2022-02-04 DIAGNOSIS — Z87.81 STATUS POST OPEN REDUCTION WITH INTERNAL FIXATION (ORIF) OF FRACTURE OF ANKLE: ICD-10-CM

## 2022-02-04 DIAGNOSIS — Z98.890 STATUS POST OPEN REDUCTION WITH INTERNAL FIXATION (ORIF) OF FRACTURE OF ANKLE: ICD-10-CM

## 2022-02-04 RX ORDER — OXYCODONE HYDROCHLORIDE AND ACETAMINOPHEN 5; 325 MG/1; MG/1
TABLET ORAL
Qty: 36 TABLET | Refills: 0 | Status: SHIPPED | OUTPATIENT
Start: 2022-02-04 | End: 2022-02-11 | Stop reason: SDUPTHER

## 2022-02-04 NOTE — TELEPHONE ENCOUNTER
Rx Refill Note  Requested Prescriptions     Pending Prescriptions Disp Refills   • oxyCODONE-acetaminophen (PERCOCET) 5-325 MG per tablet 36 tablet 0     Si-2 tablets every 4-6 hours, prn moderate-severe pain      Last office visit with prescribing clinician: Visit date not found      Next office visit with prescribing clinician: Visit date not found   Last Filled:2022    Encounter Diagnosis   Name Primary?   • Status post open reduction with internal fixation (ORIF) of fracture of ankle       Date of Surgery:s/p right ORIF bimalleolar ankle fracture dislocation with fixation of lateral malleolus only  Open reduction internal fixation right ankle syndesmotic disruption, DOS 2022    Sabrina Espino MA  22, 13:55 EST    Previous RX pended for your approval, change or denial.     {TIP  Encounters:    {TIP  Please add Last Relevant Lab Date if appropriate:  {TIP  Is Refill Pharmacy correct?:

## 2022-02-11 DIAGNOSIS — Z98.890 STATUS POST OPEN REDUCTION WITH INTERNAL FIXATION (ORIF) OF FRACTURE OF ANKLE: ICD-10-CM

## 2022-02-11 DIAGNOSIS — Z87.81 STATUS POST OPEN REDUCTION WITH INTERNAL FIXATION (ORIF) OF FRACTURE OF ANKLE: ICD-10-CM

## 2022-02-11 RX ORDER — OXYCODONE HYDROCHLORIDE AND ACETAMINOPHEN 5; 325 MG/1; MG/1
TABLET ORAL
Qty: 36 TABLET | Refills: 0 | Status: SHIPPED | OUTPATIENT
Start: 2022-02-11 | End: 2022-02-18 | Stop reason: SDUPTHER

## 2022-02-11 NOTE — TELEPHONE ENCOUNTER
Rx Refill Note  Requested Prescriptions     Pending Prescriptions Disp Refills   • oxyCODONE-acetaminophen (PERCOCET) 5-325 MG per tablet 36 tablet 0     Si-2 tablets every 4-6 hours, prn moderate-severe pain      Last office visit with prescribing clinician: Visit date not found      Next office visit with prescribing clinician: Visit date not found   Last Filled:2022    Encounter Diagnosis   Name Primary?   • Status post open reduction with internal fixation (ORIF) of fracture of ankle       Date of Surgery:2022      Sabrina Espino MA  22, 11:52 EST    Previous RX pended for your approval, change or denial.     {TIP  Encounters:    {TIP  Please add Last Relevant Lab Date if appropriate:  {TIP  Is Refill Pharmacy correct?:

## 2022-02-16 ENCOUNTER — OFFICE VISIT (OUTPATIENT)
Dept: ORTHOPEDIC SURGERY | Facility: CLINIC | Age: 57
End: 2022-02-16

## 2022-02-16 VITALS — BODY MASS INDEX: 31.1 KG/M2 | WEIGHT: 210 LBS | HEIGHT: 69 IN

## 2022-02-16 DIAGNOSIS — Z87.81 STATUS POST OPEN REDUCTION WITH INTERNAL FIXATION (ORIF) OF FRACTURE OF ANKLE: Primary | ICD-10-CM

## 2022-02-16 DIAGNOSIS — Z98.890 STATUS POST OPEN REDUCTION WITH INTERNAL FIXATION (ORIF) OF FRACTURE OF ANKLE: Primary | ICD-10-CM

## 2022-02-16 PROCEDURE — 99024 POSTOP FOLLOW-UP VISIT: CPT | Performed by: NURSE PRACTITIONER

## 2022-02-16 PROCEDURE — 73610 X-RAY EXAM OF ANKLE: CPT | Performed by: NURSE PRACTITIONER

## 2022-02-16 RX ORDER — METHYLPREDNISOLONE 4 MG/1
TABLET ORAL
Qty: 21 TABLET | Refills: 0 | Status: SHIPPED | OUTPATIENT
Start: 2022-02-16 | End: 2022-03-02

## 2022-02-17 NOTE — PROGRESS NOTES
CC: s/p right ORIF bimalleolar ankle fracture dislocation with fixation of lateral malleolus only  Open reduction internal fixation right ankle syndesmotic disruption, DOS 01/02/2022    Interval history: Richard Kim returns with family for follow-up of his right ankle.  Has continued with boot tolerating well continued active range of motion out of the boot.  He can does report some significant swelling around the medial and lateral aspect of the ankle which does decrease with rest and elevation.  He is experiencing burning sharp shooting pain which does wake him up at night does believe is related to the swelling.  Denies any numbness or tingling the right lower extremity.  Denies any presence of calf tenderness posterior aspect lower extremity.  Denies other concerns present time.    Exam:  Right lower extremity examined  Incisions clean dry and intact, well-healed  Plantarflexion 45 degrees, dorsiflexion passively to 25 degrees active with little effort to 20 degrees  Negative calf tenderness, negative Homans' sign  Brisk cap refill all digits right foot  All compartments soft and easily compressible  Abrasion to the dorsum of the foot over the greater toe continues to heal  2+ dorsalis pedis pulse  Moderate swelling at the medial and lateral ankle    Imaging:  Right Ankle X-Ray  Indication: s/p right ORIF bimalleolar ankle fracture dislocation with fixation of lateral malleolus only  Open reduction internal fixation right ankle syndesmotic disruption  Views: AP, Lateral, Mortise  Findings:  Hardware remains intact anatomical alignment appreciated, no evidence of loosening hardware failure noted on imaging no evidence of any dislocation moderate callus appreciated   prior studies available for comparison.    Assessment: s/p right ORIF bimalleolar ankle fracture dislocation with fixation of lateral malleolus only  Open reduction internal fixation right ankle syndesmotic disruption    Plan:  1.  Discussed plan  of care with patient.  High suspicion patient has continued weightbearing although instructed to avoid weightbearing as he has demonstrated when he is ambulating down the hallway on the way into clinic.  It has been reviewed with patient several times he is nonweightbearing to the right lower extremity in the past.  He does report continued range of motion exercises out of boot.  Discussed at today's visit referral for physical therapy which he does not wish to proceed but believes he can do this at home and his significant other agrees she will guide him range of motion exercises including dorsiflexion plantarflexion with use of a towel or sheet I recommended resistance band.  Can progress to partial weightbearing to the right lower extremity again I do suspect he has been weightbearing for at least the last few weeks on the right ankle.  Can work on range of motion when out of the boot but weightbearing in boot.  Plan to see him back in clinic in 2 weeks with repeat x-ray images at that time of the ankle.  Given the significant swelling and severe pain he is experiencing we will start Medrol Dosepak.  Encouraged to continue with ice, elevation.  All questions answered.    New Medications Ordered This Visit   Medications   • methylPREDNISolone (MEDROL) 4 MG dose pack     Sig: Use as directed by package instructions     Dispense:  21 tablet     Refill:  0

## 2022-02-18 DIAGNOSIS — Z98.890 STATUS POST OPEN REDUCTION WITH INTERNAL FIXATION (ORIF) OF FRACTURE OF ANKLE: ICD-10-CM

## 2022-02-18 DIAGNOSIS — Z87.81 STATUS POST OPEN REDUCTION WITH INTERNAL FIXATION (ORIF) OF FRACTURE OF ANKLE: ICD-10-CM

## 2022-02-18 NOTE — TELEPHONE ENCOUNTER
Assessment: s/p right ORIF bimalleolar ankle fracture dislocation with fixation of lateral malleolus only  Open reduction internal fixation right ankle syndesmotic disruption    Last filled 2.11.22

## 2022-02-21 RX ORDER — OXYCODONE HYDROCHLORIDE AND ACETAMINOPHEN 5; 325 MG/1; MG/1
TABLET ORAL
Qty: 36 TABLET | Refills: 0 | Status: SHIPPED | OUTPATIENT
Start: 2022-02-21 | End: 2022-03-02

## 2022-03-02 ENCOUNTER — OFFICE VISIT (OUTPATIENT)
Dept: ORTHOPEDIC SURGERY | Facility: CLINIC | Age: 57
End: 2022-03-02

## 2022-03-02 VITALS — BODY MASS INDEX: 31.1 KG/M2 | WEIGHT: 210 LBS | HEIGHT: 69 IN

## 2022-03-02 DIAGNOSIS — Z98.890 STATUS POST OPEN REDUCTION WITH INTERNAL FIXATION (ORIF) OF FRACTURE OF ANKLE: Primary | ICD-10-CM

## 2022-03-02 DIAGNOSIS — Z87.81 STATUS POST OPEN REDUCTION WITH INTERNAL FIXATION (ORIF) OF FRACTURE OF ANKLE: Primary | ICD-10-CM

## 2022-03-02 PROCEDURE — 99024 POSTOP FOLLOW-UP VISIT: CPT | Performed by: NURSE PRACTITIONER

## 2022-03-02 PROCEDURE — 73610 X-RAY EXAM OF ANKLE: CPT | Performed by: NURSE PRACTITIONER

## 2022-03-02 RX ORDER — HYDROCODONE BITARTRATE AND ACETAMINOPHEN 5; 325 MG/1; MG/1
TABLET ORAL
Qty: 30 TABLET | Refills: 0 | Status: SHIPPED | OUTPATIENT
Start: 2022-03-02 | End: 2022-03-16 | Stop reason: SDUPTHER

## 2022-03-02 NOTE — PROGRESS NOTES
CC: s/p right ORIF bimalleolar ankle fracture dislocation with fixation of lateral malleolus only  Open reduction internal fixation right ankle syndesmotic disruption, DOS 01/02/2022    Interval history: Rcihard Kim returns to clinic for follow-up of his right ankle.  Is continued with active range of motion tolerating well.  Has completed the oral steroid which did help decrease the swelling.  Does continue to experience shooting pain from time to time but does decrease with rest.  Continues to deny presence of calf tenderness.  Denies other concerns present time.    Exam:  Right lower extremity examined  Incisions clean dry and intact, well-healed  Plantarflexion 40 degrees, dorsiflexion passively to 25 degrees active with little effort to 20 degrees, 4/5 strength against resistance   Negative calf tenderness, negative Homans' sign  Brisk cap refill all digits right foot  All compartments soft and easily compressible  Abrasion to the dorsum of the foot over the greater toe continues to heal  2+ dorsalis pedis pulse  Mild swelling at the medial and lateral ankle    Imaging:  Right Ankle X-Ray  Indication: Open reduction internal fixation bimalleolar ankle fracture  Views: AP, Lateral, Mortise  Findings:  Hardware remains intact anatomical alignment appreciated no evidence of any loosening or hardware failure, moderate callus appreciated no significant changes from most recent imaging  prior studies available for comparison.    Assessment: s/p right ORIF bimalleolar ankle fracture dislocation with fixation of lateral malleolus only  Open reduction internal fixation right ankle syndesmotic disruption    Plan:  1.  Discussed with Patient.  Encouraged to continue with active range of motion including the resistance band.  Again does not wish to proceed with any referral to physical therapy.  I do suspect noncompliance with weightbearing I do suspect that he has been weightbearing for the last several weeks.  Was  able to demonstrate weightbearing in clinic today as he has brought tennis shoe tolerating very well.  Plan to see him back in clinic in 4 weeks with repeat x-ray images of his ankle at that time.  All questions answered.

## 2022-03-16 DIAGNOSIS — Z98.890 STATUS POST OPEN REDUCTION WITH INTERNAL FIXATION (ORIF) OF FRACTURE OF ANKLE: ICD-10-CM

## 2022-03-16 DIAGNOSIS — Z87.81 STATUS POST OPEN REDUCTION WITH INTERNAL FIXATION (ORIF) OF FRACTURE OF ANKLE: ICD-10-CM

## 2022-03-16 RX ORDER — HYDROCODONE BITARTRATE AND ACETAMINOPHEN 5; 325 MG/1; MG/1
TABLET ORAL
Qty: 30 TABLET | Refills: 0 | Status: SHIPPED | OUTPATIENT
Start: 2022-03-16 | End: 2022-03-30

## 2022-03-16 NOTE — TELEPHONE ENCOUNTER
Rx Refill Note  Requested Prescriptions     Pending Prescriptions Disp Refills   • HYDROcodone-acetaminophen (NORCO) 5-325 MG per tablet 30 tablet 0     Si-2 tablets every 4-6 hours, prn moderate-severe pain      Last office visit with prescribing clinician: Visit date not found      Next office visit with prescribing clinician: Visit date not found   Last Filled:2022    Encounter Diagnosis   Name Primary?   • Status post open reduction with internal fixation (ORIF) of fracture of ankle, DOS 2022       DX:s/p right ORIF bimalleolar ankle fracture dislocation with fixation of lateral malleolus only  Open reduction internal fixation right ankle syndesmotic disruption, DOS 2022      Sabrina Espino MA  22, 12:10 EDT    Previous RX pended for your approval, change or denial.     {TIP  Encounters:    {TIP  Please add Last Relevant Lab Date if appropriate:  {TIP  Is Refill Pharmacy correct?: refill

## 2022-03-30 ENCOUNTER — OFFICE VISIT (OUTPATIENT)
Dept: ORTHOPEDIC SURGERY | Facility: CLINIC | Age: 57
End: 2022-03-30

## 2022-03-30 ENCOUNTER — HOSPITAL ENCOUNTER (OUTPATIENT)
Dept: ULTRASOUND IMAGING | Facility: HOSPITAL | Age: 57
Discharge: HOME | End: 2022-03-30
Admitting: NURSE PRACTITIONER

## 2022-03-30 VITALS — HEIGHT: 69 IN | BODY MASS INDEX: 31.1 KG/M2 | WEIGHT: 210 LBS

## 2022-03-30 DIAGNOSIS — Z87.81 STATUS POST OPEN REDUCTION WITH INTERNAL FIXATION (ORIF) OF FRACTURE OF ANKLE: ICD-10-CM

## 2022-03-30 DIAGNOSIS — M79.661 RIGHT CALF PAIN: ICD-10-CM

## 2022-03-30 DIAGNOSIS — Z87.81 STATUS POST OPEN REDUCTION WITH INTERNAL FIXATION (ORIF) OF FRACTURE OF ANKLE: Primary | ICD-10-CM

## 2022-03-30 DIAGNOSIS — Z98.890 STATUS POST OPEN REDUCTION WITH INTERNAL FIXATION (ORIF) OF FRACTURE OF ANKLE: ICD-10-CM

## 2022-03-30 DIAGNOSIS — Z98.890 STATUS POST OPEN REDUCTION WITH INTERNAL FIXATION (ORIF) OF FRACTURE OF ANKLE: Primary | ICD-10-CM

## 2022-03-30 PROCEDURE — 73610 X-RAY EXAM OF ANKLE: CPT | Performed by: NURSE PRACTITIONER

## 2022-03-30 PROCEDURE — 93971 EXTREMITY STUDY: CPT

## 2022-03-30 PROCEDURE — 99024 POSTOP FOLLOW-UP VISIT: CPT | Performed by: NURSE PRACTITIONER

## 2022-03-30 RX ORDER — MELOXICAM 15 MG/1
15 TABLET ORAL DAILY
Qty: 30 TABLET | Refills: 2 | Status: SHIPPED | OUTPATIENT
Start: 2022-03-30 | End: 2022-11-16

## 2022-03-30 RX ORDER — HYDROCODONE BITARTRATE AND ACETAMINOPHEN 5; 325 MG/1; MG/1
1 TABLET ORAL EVERY 8 HOURS PRN
Qty: 30 TABLET | Refills: 0 | Status: SHIPPED | OUTPATIENT
Start: 2022-03-30

## 2022-03-30 NOTE — PROGRESS NOTES
CC: s/p right ORIF bimalleolar ankle fracture dislocation with fixation of lateral malleolus only  Open reduction internal fixation right ankle syndesmotic disruption, DOS 01/02/2022    Interval history: Richard Kim returns to clinic for follow-up of his right ankle.  Continues to experience pain along the medial and lateral aspect of the ankle as well as calf pain at the right lower extremity.  Continues to experience swelling medial lateral aspect the ankle as well.  Increased pain noted with rest, activity such as weightbearing activities.  Is continued weightbearing as tolerated right lower extremity.  He does report numbness and tingling but does have underlying history of neuropathy in the right lower extremity.  Discussed physical therapy last visit does not wish to proceed with any PT reports has been completing home physical therapy with family. Denies other concerns present time.    Exam:  Right lower extremity examined:  Incisions well-healed  Moderate swelling medial and lateral aspect of ankle  Plantarflexion 40 degrees passively, dorsiflexion 20 degrees passively with 4 out of 5 strength  Positive calf tenderness, positive Homans' sign  2+ dorsalis pedis pulse  All compartments soft easily compressible  Abrasion noted at the distal tibia mild erythema reports that he hit the anterior aspect of the lower extremity the other day    Imaging:  Right Ankle X-Ray  Indication:   Views: AP, Lateral, Mortise  Findings: s/p right ORIF bimalleolar ankle fracture dislocation with fixation of lateral malleolus only  Open reduction internal fixation right ankle syndesmotic disruption  Hardware remains intact anatomical alignment appreciated fracture line remains visible distal fibula no evidence of any hardware failure loosening soft tissue swelling along the medial and lateral ankle  Prior studies available for comparison.    Assessment: s/p right ORIF bimalleolar ankle fracture dislocation with fixation of  lateral malleolus only  Open reduction internal fixation right ankle syndesmotic disruption    Plan:  1.  We will proceed with stat US venous Doppler to evaluate for blood clot.  Discussed we will call with results.  Also plan to proceed with CT to evaluate fracture healing right ankle.  We will start meloxicam 1 tablet once daily.  Refill hydrocodone/acetaminophen at this time due to the severity of the pain he is experiencing.  Encouraged to continue range of motion weightbearing as tolerated.  All questions answered.    New Medications Ordered This Visit   Medications   • meloxicam (MOBIC) 15 MG tablet     Sig: Take 1 tablet by mouth Daily.     Dispense:  30 tablet     Refill:  2   • HYDROcodone-acetaminophen (NORCO) 5-325 MG per tablet     Sig: Take 1 tablet by mouth Every 8 (Eight) Hours As Needed for Severe Pain .     Dispense:  30 tablet     Refill:  0

## 2022-04-05 ENCOUNTER — HOSPITAL ENCOUNTER (OUTPATIENT)
Dept: CT IMAGING | Facility: HOSPITAL | Age: 57
Discharge: HOME | End: 2022-04-05
Admitting: NURSE PRACTITIONER

## 2022-04-05 DIAGNOSIS — Z87.81 STATUS POST OPEN REDUCTION WITH INTERNAL FIXATION (ORIF) OF FRACTURE OF ANKLE: ICD-10-CM

## 2022-04-05 DIAGNOSIS — Z98.890 STATUS POST OPEN REDUCTION WITH INTERNAL FIXATION (ORIF) OF FRACTURE OF ANKLE: ICD-10-CM

## 2022-04-05 PROCEDURE — 73700 CT LOWER EXTREMITY W/O DYE: CPT

## 2022-04-11 ENCOUNTER — TELEPHONE (OUTPATIENT)
Dept: ORTHOPEDIC SURGERY | Facility: CLINIC | Age: 57
End: 2022-04-11

## 2022-04-11 DIAGNOSIS — Z98.890 STATUS POST OPEN REDUCTION WITH INTERNAL FIXATION (ORIF) OF FRACTURE OF ANKLE: Primary | ICD-10-CM

## 2022-04-11 DIAGNOSIS — S82.841K ANKLE FRACTURE, BIMALLEOLAR, CLOSED, RIGHT, WITH NONUNION, SUBSEQUENT ENCOUNTER: ICD-10-CM

## 2022-04-11 DIAGNOSIS — Z87.81 STATUS POST OPEN REDUCTION WITH INTERNAL FIXATION (ORIF) OF FRACTURE OF ANKLE: Primary | ICD-10-CM

## 2022-04-11 NOTE — TELEPHONE ENCOUNTER
XR Ankle 3+ View Right    Result Date: 3/30/2022  Ordering physician's impression is located in the Encounter Note dated 03/30/22.     US Venous Doppler Lower Extremity Right (duplex)    Result Date: 3/30/2022  VENOUS DOPPLER ULTRASOUND, RIGHT LOWER EXTREMITY, 03/30/2022  HISTORY: 56-year-old male with right ankle fracture 2 months ago. Increasing right calf pain over the last 2 weeks. Assess for DVT.  TECHNIQUE: Venous Doppler ultrasound examination of the right leg was performed using grey-scale, spectral Doppler, and color flow Doppler ultrasound imaging.  FINDINGS: The examination is negative.  There is no evidence of deep venous thrombosis from the groin to the lower calf. The greater saphenous vein is also patent.      Negative examination.  No evidence of right lower extremity DVT.  This report was finalized on 3/30/2022 10:56 AM by Dr. Grant Wiggins MD.      CT ankle right wo contrast    Result Date: 4/6/2022  CT Ankle RT WO INDICATION:  Lateral right ankle pain and swelling status post ORIF 2 months ago. Order requesting evaluation of fracture healing. TECHNIQUE: CT of the right ankle without IV contrast. Coronal and sagittal reconstructions were obtained.  Radiation dose reduction techniques included automated exposure control or exposure modulation based on body size. Count of known CT and cardiac nuc med studies performed in previous 12 months: 2.  COMPARISON:  CT right ankle 1/1/2022 FINDINGS: There are postsurgical changes from ORIF of the distal fibula with a lateral sideplate and multiple associated screws as well as 2 interfragmentary screws in the distal fibular metaphysis. No evidence of hardware failure or loosening. Alignment is anatomic. The spiral oblique fracture of the distal fibular metaphysis remains well-visualized without evidence of significant osseous bridging. There is a retention band noted through the distal tibiofibular syndesmosis. Tibiotalar alignment is normal.  Redemonstrated coronal oblique intra-articular fracture of the posterior malleolus of the distal tibia. Alignment is near-anatomic. There is residual 2 mm step off at the articular surface. The fracture line remains well-visualized with minimal osseous bridging and callus formation along the proximal aspect of the fracture. Overall healing is estimated at less than 10%. Persistent osteochondral irregularity of the lateral talar dome. No loose or unstable osteochondral bodies. There are a few small osseous fragments anterior and inferior to the medial malleolus. Moderate subcutaneous soft tissue edema surrounding the visualized lower leg and hindfoot. No grossly drainable soft tissue fluid collections. Visualized flexor and extensor tendons appear grossly unremarkable.     1. Postsurgical changes from ORIF of the right ankle, detailed above. No evidence of hardware complication. 2. Spiral oblique fracture of the distal fibular metaphysis remains well-visualized without evidence of significant osseous bridging. Alignment is anatomic. 3. Coronal oblique intra-articular fracture of the posterior malleolus of the distal tibia with persistent 2 mm step off at the articular surface. Alignment is near-anatomic. Estimated less than 10% healing along the proximal aspect of the fracture. 4. Osteochondral irregularity of the lateral talar dome. No evidence of a loose or unstable osteochondral fragment. 5. Several small osseous fragments anterior and inferior to the medial malleolus. 6. Moderate subcutaneous soft tissue edema throughout the visualized lower leg and hindfoot. No grossly drainable soft tissue fluid collections. Signer Name: Gary Rosario MD  Signed: 4/6/2022 1:01 PM  Workstation Name: [id number]  [facility]      Called patient and family to discuss treatment going forward.  We will proceed with bone stimulator right ankle.  Will be contacted regarding the bone stimulator plan to see him back  in clinic after 4 weeks of wearing a bone stimulator with repeat x-ray images of the right ankle at that time.  Patient and family verbalized understanding of information agrees with plan of care.  Denies other concerns present time.

## 2022-04-13 DIAGNOSIS — Z87.81 STATUS POST OPEN REDUCTION WITH INTERNAL FIXATION (ORIF) OF FRACTURE OF ANKLE: ICD-10-CM

## 2022-04-13 DIAGNOSIS — Z98.890 STATUS POST OPEN REDUCTION WITH INTERNAL FIXATION (ORIF) OF FRACTURE OF ANKLE: ICD-10-CM

## 2022-04-13 RX ORDER — HYDROCODONE BITARTRATE AND ACETAMINOPHEN 5; 325 MG/1; MG/1
1 TABLET ORAL EVERY 8 HOURS PRN
Qty: 30 TABLET | Refills: 0 | OUTPATIENT
Start: 2022-04-13

## 2022-04-13 NOTE — TELEPHONE ENCOUNTER
Patient's emergency contact advised as she is whom answered the phone- per her report the patient was outside.

## 2022-04-13 NOTE — TELEPHONE ENCOUNTER
Rx Refill Note  Requested Prescriptions     Pending Prescriptions Disp Refills   • HYDROcodone-acetaminophen (NORCO) 5-325 MG per tablet 30 tablet 0     Sig: Take 1 tablet by mouth Every 8 (Eight) Hours As Needed for Severe Pain .      Last office visit with prescribing clinician: Visit date not found      Next office visit with prescribing clinician: Visit date not found   Last Filled:03.30.2022    Encounter Diagnosis   Name Primary?   • Status post open reduction with internal fixation (ORIF) of fracture of ankle       Date of Surgery:  s/p right ORIF bimalleolar ankle fracture dislocation with fixation of lateral malleolus only  Open reduction internal fixation right ankle syndesmotic disruption, DOS 01/02/2022    Sabrina Espino MA  04/13/22, 11:31 EDT    Previous RX pended for your approval, change or denial.     {TIP  Encounters:    {TIP  Please add Last Relevant Lab Date if appropriate:  {TIP  Is Refill Pharmacy correct?:

## 2022-04-15 ENCOUNTER — TELEPHONE (OUTPATIENT)
Dept: ORTHOPEDIC SURGERY | Facility: CLINIC | Age: 57
End: 2022-04-15

## 2022-04-15 NOTE — TELEPHONE ENCOUNTER
Spoke with Kim MARRUFO she sent the bone stem order to [facility] was working on the Auth ans will contact the patient.     DX:s/p right ORIF bimalleolar ankle fracture dislocation with fixation of lateral malleolus only  Open reduction internal fixation right ankle syndesmotic disruption, DOS 01/02/2022    Patient agreeable.

## 2022-11-16 RX ORDER — MELOXICAM 15 MG/1
TABLET ORAL
Qty: 30 TABLET | Refills: 0 | Status: SHIPPED | OUTPATIENT
Start: 2022-11-16 | End: 2023-01-27

## 2022-11-16 NOTE — TELEPHONE ENCOUNTER
Rx Refill Note  Requested Prescriptions     Pending Prescriptions Disp Refills    meloxicam (MOBIC) 15 MG tablet [Pharmacy Med Name: MELOXICAM 15 MG TABLET] 30 tablet 2     Sig: TAKE ONE TABLET BY MOUTH DAILY      Last office visit with prescribing clinician: 3/30/2022      Next office visit with prescribing clinician: Visit date not found   Last Filled:3/30/22    Status post open reduction with internal fixation (ORIF) of fracture of ankle +1 more    Emely Clifton MA  11/16/22, 14:23 EST    Previous RX pended for your approval, change or denial.     {TIP  Encounters:    {TIP  Please add Last Relevant Lab Date if appropriate:  {TIP  Is Refill Pharmacy correct?:

## 2023-01-27 RX ORDER — MELOXICAM 15 MG/1
TABLET ORAL
Qty: 30 TABLET | Refills: 0 | Status: SHIPPED | OUTPATIENT
Start: 2023-01-27 | End: 2023-02-27

## 2023-01-27 NOTE — TELEPHONE ENCOUNTER
Rx Refill Note  Requested Prescriptions     Pending Prescriptions Disp Refills    meloxicam (MOBIC) 15 MG tablet [Pharmacy Med Name: MELOXICAM 15 MG TABLET] 30 tablet 0     Sig: TAKE ONE TABLET BY MOUTH DAILY      Last office visit with prescribing clinician: 3/30/2022      Next office visit with prescribing clinician: Visit date not found   Last Filled:11.16.2022    DX:s/p right ORIF bimalleolar ankle fracture dislocation with fixation of lateral malleolus only  Open reduction internal fixation right ankle syndesmotic disruption, DOS 01/02/2022      Sabrina Espino MA  01/27/23, 13:50 EST    {TIP  Encounters:    {TIP  Please add Last Relevant Lab Date if appropriate:  {TIP  Is Refill Pharmacy correct?:  c

## 2023-02-27 RX ORDER — MELOXICAM 15 MG/1
TABLET ORAL
Qty: 30 TABLET | Refills: 0 | Status: SHIPPED | OUTPATIENT
Start: 2023-02-27

## 2023-02-27 NOTE — TELEPHONE ENCOUNTER
Rx Refill Note  Requested Prescriptions     Pending Prescriptions Disp Refills    meloxicam (MOBIC) 15 MG tablet [Pharmacy Med Name: MELOXICAM 15 MG TABLET] 30 tablet 0     Sig: TAKE ONE TABLET BY MOUTH DAILY      Last office visit with prescribing clinician: 3/30/2022      Next office visit with prescribing clinician: Visit date not found   Last Filled:01.27.2023    DX:  s/p right ORIF bimalleolar ankle fracture dislocation with fixation of lateral malleolus only  Open reduction internal fixation right ankle syndesmotic disruption, DOS 01/02/2022    Sabrina Espino MA  02/27/23, 11:43 EST    {TIP  Encounters:    {TIP  Please add Last Relevant Lab Date if appropriate:  {TIP  Is Refill Pharmacy correct?:

## 2023-03-29 RX ORDER — MELOXICAM 15 MG/1
TABLET ORAL
Qty: 30 TABLET | Refills: 0 | OUTPATIENT
Start: 2023-03-29

## 2023-11-21 ENCOUNTER — TELEPHONE (OUTPATIENT)
Dept: SURGERY | Facility: CLINIC | Age: 58
End: 2023-11-21
Payer: MEDICARE

## 2023-12-04 ENCOUNTER — PREP FOR SURGERY (OUTPATIENT)
Dept: OTHER | Facility: HOSPITAL | Age: 58
End: 2023-12-04
Payer: MEDICARE

## 2023-12-04 DIAGNOSIS — Z12.11 COLON CANCER SCREENING: Primary | ICD-10-CM

## 2023-12-05 PROBLEM — Z12.11 COLON CANCER SCREENING: Status: ACTIVE | Noted: 2023-12-04

## 2023-12-11 ENCOUNTER — ANESTHESIA EVENT (OUTPATIENT)
Dept: PERIOP | Facility: HOSPITAL | Age: 58
End: 2023-12-11
Payer: MEDICARE

## 2023-12-12 ENCOUNTER — HOSPITAL ENCOUNTER (OUTPATIENT)
Facility: HOSPITAL | Age: 58
Setting detail: HOSPITAL OUTPATIENT SURGERY
Discharge: HOME | End: 2023-12-12
Attending: SURGERY | Admitting: SURGERY
Payer: MEDICARE

## 2023-12-12 ENCOUNTER — ANESTHESIA (OUTPATIENT)
Dept: PERIOP | Facility: HOSPITAL | Age: 58
End: 2023-12-12
Payer: MEDICARE

## 2023-12-12 VITALS
OXYGEN SATURATION: 97 % | TEMPERATURE: 98 F | HEART RATE: 97 BPM | DIASTOLIC BLOOD PRESSURE: 74 MMHG | BODY MASS INDEX: 27.96 KG/M2 | HEIGHT: 69 IN | RESPIRATION RATE: 15 BRPM | SYSTOLIC BLOOD PRESSURE: 136 MMHG | WEIGHT: 188.8 LBS

## 2023-12-12 DIAGNOSIS — Z12.11 COLON CANCER SCREENING: ICD-10-CM

## 2023-12-12 LAB — GLUCOSE BLDC GLUCOMTR-MCNC: 201 MG/DL (ref 70–130)

## 2023-12-12 PROCEDURE — 25010000002 PROPOFOL 200 MG/20ML EMULSION: Performed by: NURSE ANESTHETIST, CERTIFIED REGISTERED

## 2023-12-12 PROCEDURE — 88305 TISSUE EXAM BY PATHOLOGIST: CPT | Performed by: SURGERY

## 2023-12-12 PROCEDURE — 82948 REAGENT STRIP/BLOOD GLUCOSE: CPT

## 2023-12-12 PROCEDURE — 25810000003 LACTATED RINGERS PER 1000 ML: Performed by: NURSE ANESTHETIST, CERTIFIED REGISTERED

## 2023-12-12 RX ORDER — MAGNESIUM HYDROXIDE 1200 MG/15ML
LIQUID ORAL AS NEEDED
Status: DISCONTINUED | OUTPATIENT
Start: 2023-12-12 | End: 2023-12-12 | Stop reason: HOSPADM

## 2023-12-12 RX ORDER — SODIUM CHLORIDE 0.9 % (FLUSH) 0.9 %
10 SYRINGE (ML) INJECTION EVERY 12 HOURS SCHEDULED
Status: DISCONTINUED | OUTPATIENT
Start: 2023-12-12 | End: 2023-12-12 | Stop reason: HOSPADM

## 2023-12-12 RX ORDER — SODIUM CHLORIDE 9 MG/ML
40 INJECTION, SOLUTION INTRAVENOUS AS NEEDED
Status: DISCONTINUED | OUTPATIENT
Start: 2023-12-12 | End: 2023-12-12 | Stop reason: HOSPADM

## 2023-12-12 RX ORDER — LIDOCAINE HYDROCHLORIDE 20 MG/ML
INJECTION, SOLUTION INFILTRATION; PERINEURAL AS NEEDED
Status: DISCONTINUED | OUTPATIENT
Start: 2023-12-12 | End: 2023-12-12 | Stop reason: SURG

## 2023-12-12 RX ORDER — SODIUM CHLORIDE, SODIUM LACTATE, POTASSIUM CHLORIDE, CALCIUM CHLORIDE 600; 310; 30; 20 MG/100ML; MG/100ML; MG/100ML; MG/100ML
9 INJECTION, SOLUTION INTRAVENOUS CONTINUOUS
Status: DISCONTINUED | OUTPATIENT
Start: 2023-12-12 | End: 2023-12-12 | Stop reason: HOSPADM

## 2023-12-12 RX ORDER — SODIUM CHLORIDE 0.9 % (FLUSH) 0.9 %
10 SYRINGE (ML) INJECTION AS NEEDED
Status: DISCONTINUED | OUTPATIENT
Start: 2023-12-12 | End: 2023-12-12 | Stop reason: HOSPADM

## 2023-12-12 RX ORDER — LIDOCAINE HYDROCHLORIDE 10 MG/ML
0.5 INJECTION, SOLUTION INFILTRATION; PERINEURAL ONCE AS NEEDED
Status: DISCONTINUED | OUTPATIENT
Start: 2023-12-12 | End: 2023-12-12 | Stop reason: HOSPADM

## 2023-12-12 RX ORDER — PROPOFOL 10 MG/ML
INJECTION, EMULSION INTRAVENOUS AS NEEDED
Status: DISCONTINUED | OUTPATIENT
Start: 2023-12-12 | End: 2023-12-12 | Stop reason: SURG

## 2023-12-12 RX ORDER — ONDANSETRON 2 MG/ML
4 INJECTION INTRAMUSCULAR; INTRAVENOUS ONCE AS NEEDED
Status: DISCONTINUED | OUTPATIENT
Start: 2023-12-12 | End: 2023-12-12 | Stop reason: HOSPADM

## 2023-12-12 RX ADMIN — LIDOCAINE HYDROCHLORIDE 100 MG: 20 INJECTION, SOLUTION INFILTRATION; PERINEURAL at 08:57

## 2023-12-12 RX ADMIN — SODIUM CHLORIDE, POTASSIUM CHLORIDE, SODIUM LACTATE AND CALCIUM CHLORIDE: 600; 310; 30; 20 INJECTION, SOLUTION INTRAVENOUS at 08:45

## 2023-12-12 RX ADMIN — PROPOFOL INJECTABLE EMULSION 600 MG: 10 INJECTION, EMULSION INTRAVENOUS at 08:57

## 2023-12-12 NOTE — ANESTHESIA POSTPROCEDURE EVALUATION
Patient: iRchard Kim    Procedure Summary       Date: 12/12/23 Room / Location: [facility] ENDOSCOPY 1 /  [facility] OR    Anesthesia Start: 0853 Anesthesia Stop:     Procedure: COLONOSCOPY WITH POLYPECTOMY Diagnosis:       Colon cancer screening      (Colon cancer screening [Z12.11])    Surgeons: Evelyn Ontiveros DO Provider: Anthony Mittal CRNA    Anesthesia Type: MAC ASA Status: 3            Anesthesia Type: MAC    Vitals  Vitals Value Taken Time   /70 12/12/23 0950   Temp 98 °F (36.7 °C) 12/12/23 0931   Pulse 97 12/12/23 0957   Resp 15 12/12/23 0950   SpO2 97 % 12/12/23 0957   Vitals shown include unfiled device data.        Post Anesthesia Care and Evaluation    Patient location during evaluation: PHASE II  Patient participation: complete - patient participated  Level of consciousness: awake and alert  Pain score: 0  Pain management: adequate    Airway patency: patent  Anesthetic complications: No anesthetic complications  PONV Status: none  Cardiovascular status: acceptable  Respiratory status: acceptable  Hydration status: acceptable

## 2023-12-12 NOTE — H&P
General Surgery      Patient Care Team:  Parris Glover MD as PCP - General (Family Medicine)    CHIEF COMPLAINT: Colon cancer screening    HISTORY OF PRESENT ILLNESS:    This very pleasant 4-year-old male is here for his colonoscopy.  He had a colonoscopy 10 years ago that was normal.      Past Medical History:   Diagnosis Date    Anxiety and depression     Arthritis     Asthma     Bipolar disorder     COPD (chronic obstructive pulmonary disease)     Diabetes mellitus     DVT (deep venous thrombosis)     YEARS AGO IN LEFT LEG    Erectile dysfunction     Leg pain, bilateral     Low back pain     Neuropathy     Penile implant failure     Sleep apnea      Past Surgical History:   Procedure Laterality Date    ANKLE OPEN REDUCTION INTERNAL FIXATION Right 1/2/2022    Procedure: ANKLE OPEN REDUCTION INTERNAL FIXATION;  Surgeon: Zeferino Montgomery MD;  Location: [facility] OR;  Service: Orthopedics;  Laterality: Right;    COLONOSCOPY      PENILE PROSTHESIS IMPLANT N/A 3/13/2019    Procedure: PENILE PROSTHESIS PLACEMENT;  Surgeon: Campos Hopkins MD;  Location: [facility] OR;  Service: Urology    PENILE PROSTHESIS IMPLANT N/A 10/29/2020    Procedure: MANIPULATION OF PENILE PROSTHESIS;  Surgeon: Campos Hopkins MD;  Location: [facility] OR;  Service: Urology;  Laterality: N/A;    SINUS SURGERY       Family History   Problem Relation Age of Onset    No Known Problems Mother     No Known Problems Father     Malig Hyperthermia Neg Hx      Social History     Tobacco Use    Smoking status: Never    Smokeless tobacco: Never   Vaping Use    Vaping Use: Never used   Substance Use Topics    Alcohol use: Yes     Comment: OCCASIONALLY    Drug use: No     Medications Prior to Admission   Medication Sig Dispense Refill Last Dose    albuterol sulfate  (90 Base) MCG/ACT inhaler Inhale 2 puffs Every 4 (Four) Hours As Needed for Wheezing.   12/11/2023    amitriptyline (ELAVIL) 25 MG tablet Take 1 tablet by mouth Every  "Night. CAN TAKE 1-2 TAB   12/11/2023    benztropine (COGENTIN) 1 MG tablet Take 1 tablet by mouth 2 (Two) Times a Day.   12/11/2023    Canagliflozin (INVOKANA) 300 MG tablet tablet Take 1 tablet by mouth Daily.   12/11/2023    Cariprazine HCl (VRAYLAR) 3 MG capsule capsule Take 1 capsule by mouth Every Night.   12/11/2023    Dulaglutide 1.5 MG/0.5ML solution pen-injector Inject 1.5 mg under the skin into the appropriate area as directed 1 (One) Time Per Week. MONDAYS 12/11/2023    gabapentin (NEURONTIN) 300 MG capsule Take 2 capsules by mouth 3 (Three) Times a Day.   12/11/2023    glimepiride (AMARYL) 2 MG tablet Take 1 tablet by mouth Every Morning Before Breakfast.   12/11/2023    metFORMIN ER (GLUCOPHAGE-XR) 500 MG 24 hr tablet Take 2 tablets by mouth 2 (Two) Times a Day.   12/11/2023    Probiotic, Lactobacillus, capsule Take 1 capsule by mouth Daily. 30 capsule 0 12/11/2023    rosuvastatin (CRESTOR) 5 MG tablet Take 1 tablet by mouth Every Night.   12/11/2023    traZODone (DESYREL) 50 MG tablet Take 1 tablet by mouth Every Night.   12/11/2023    albuterol (PROVENTIL) (2.5 MG/3ML) 0.083% nebulizer solution Inhale 2.5 mg Every 4 (Four) Hours.   12/10/2023    meloxicam (MOBIC) 15 MG tablet TAKE ONE TABLET BY MOUTH DAILY 30 tablet 0 12/10/2023     Allergies:  Aspirin, Banana, and Penicillins    REVIEW OF SYSTEMS:  Please see the above history of present illness for pertinent positives and negatives.  The remainder of the patient's systems have been reviewed and are negative.     Vital Signs  Temp:  [98.2 °F (36.8 °C)] 98.2 °F (36.8 °C)  Heart Rate:  [93] 93  Resp:  [12] 12  BP: (140)/(79) 140/79    Flowsheet Rows      Flowsheet Row First Filed Value   Admission Height 175.3 cm (69\") Documented at 12/11/2023 1724   Admission Weight 99.8 kg (220 lb) Documented at 12/11/2023 1724             Physical Exam:  Physical Exam   Constitutional: Patient appears well-developed and well-nourished and in no acute distress "   HEENT:   Head: Normocephalic and atraumatic.   Eyes:  Pupils are equal, round, and reactive to light.  Mouth and Throat: Patient has moist mucous membranes. Oropharynx is clear of any erythema or exudate.     Musculoskeletal: Normal posture.  Extremities: No edema.  Neurological: Patient is alert and oriented.  Psychological:   Mood and behavior appropriate.  Skin: Skin is warm and dry.    Debilities/Disabilities Identified: None    Emotional Behavior: Appropriate           Results Review:    I reviewed the patient's new clinical results.  Lab Results (most recent)       Procedure Component Value Units Date/Time    POC Glucose Once [[id number]]  (Abnormal) Collected: 12/12/23 0828    Specimen: Blood Updated: 12/12/23 0835     Glucose 201 mg/dL             Imaging Results (Most Recent)       None              ECG/EMG Results (most recent)       None              Assessment & Plan     Colon cancer screening  I discussed with the patient the benefits and risks of performing endoscopy.  Benefits and risks were not limited to but including bleeding, infection, perforation, complications of anesthesia, aspiration.  The patient appeared to understand and is willing to proceed.    Thank you for allowing me to participate in the care of this interesting patient.      Evelyn Ontiveros DO  12/12/23  08:48 EST

## 2023-12-12 NOTE — ANESTHESIA PREPROCEDURE EVALUATION
Anesthesia Evaluation     Patient summary reviewed and Nursing notes reviewed   no history of anesthetic complications:   NPO Solid Status: > 8 hours  NPO Liquid Status: > 8 hours           Airway   Mallampati: II  TM distance: >3 FB  Neck ROM: full  No difficulty expected  Dental          Pulmonary - normal exam    breath sounds clear to auscultation  (+) COPD moderate, asthma,sleep apnea  (-) shortness of breath, not a smoker  Cardiovascular - normal exam  Exercise tolerance: poor (<4 METS)    ECG reviewed  Rhythm: regular  Rate: normal    (+) DVT resolved, hyperlipidemia  (-) angina      Neuro/Psych  (+) psychiatric history Anxiety, Depression and Bipolar  GI/Hepatic/Renal/Endo    (+) diabetes mellitus type 2    Musculoskeletal     Abdominal   (+) obese   Substance History   (+) alcohol use     OB/GYN negative ob/gyn ROS         Other   arthritis,                       Anesthesia Plan    ASA 3     MAC     intravenous induction     Anesthetic plan, risks, benefits, and alternatives have been provided, discussed and informed consent has been obtained with: patient.  Pre-procedure education provided  Use of blood products discussed with patient  Consented to blood products.    Plan discussed with CRNA.

## 2023-12-12 NOTE — OP NOTE
Colonoscopy Procedure Note      Pre-operative Diagnosis: Colon cancer screening    Post-operative Diagnosis: Left colon polyps-very poor prep and diverticulosis    Procedure: Colonoscopy with polypectomy using hot snare and cold forceps    Surgeon: Rama    Anesthetic: Anthony Delgado CRNA    Estimated Blood Loss: Minimal    Complications: None    Indications: Colon cancer screening    Findings/Treatments:   Left colon polyps x 2-1 was with hot snare the other cold forceps       Scope Withdrawal Time:  6 minutes      Recommendations: Richard will need a 1 year repeat with a 2-day prep      Procedure Details     After discussing the benefits and risks of the procedure with the patient, not limited to but including:  Bleeding, infection, perforation, aspiration; informed consent was signed.  The patient was taken into the endoscopy room at [facility] and placed in the left lateral decubitus position.  MAC anesthesia was given with appropriate cardiopulmonary monitoring.  A rectal exam was performed.  Sphincter tone was normal.  The colonoscope was then inserted and carefully advanced to the cecum while visualizing the mucosa.  The cecum was identified by the ileocecal valve and the orifice of the appendix.  Once in the cecum the scope was slowly withdrawn while attempting to well visualize mucosa and deflate air.  He had diverticulosis of the colon as well as 2 polyps left colon that removed with hot snare and cold forceps.  After the rectum was evaluated the scope was removed and Richard was taken to the recovery area in stable postoperative condition having tolerated his procedure well.    Evelyn Ontiveros DO

## 2023-12-13 LAB
LAB AP CASE REPORT: NORMAL
PATH REPORT.FINAL DX SPEC: NORMAL
PATH REPORT.GROSS SPEC: NORMAL

## 2024-10-23 ENCOUNTER — TELEPHONE (OUTPATIENT)
Dept: SURGERY | Facility: CLINIC | Age: 59
End: 2024-10-23

## 2024-11-01 ENCOUNTER — OFFICE VISIT (OUTPATIENT)
Dept: ORTHOPEDIC SURGERY | Facility: CLINIC | Age: 59
End: 2024-11-01
Payer: MEDICARE

## 2024-11-01 VITALS
SYSTOLIC BLOOD PRESSURE: 133 MMHG | HEART RATE: 88 BPM | BODY MASS INDEX: 26.36 KG/M2 | WEIGHT: 178 LBS | DIASTOLIC BLOOD PRESSURE: 80 MMHG | HEIGHT: 69 IN

## 2024-11-01 DIAGNOSIS — M25.562 CHRONIC PAIN OF LEFT KNEE: Primary | ICD-10-CM

## 2024-11-01 DIAGNOSIS — G89.29 CHRONIC PAIN OF LEFT KNEE: Primary | ICD-10-CM

## 2024-11-01 DIAGNOSIS — M17.12 PRIMARY OSTEOARTHRITIS OF LEFT KNEE: ICD-10-CM

## 2024-11-01 PROCEDURE — 99213 OFFICE O/P EST LOW 20 MIN: CPT | Performed by: NURSE PRACTITIONER

## 2024-11-01 RX ORDER — DULAGLUTIDE 4.5 MG/.5ML
INJECTION, SOLUTION SUBCUTANEOUS
COMMUNITY
Start: 2024-10-28

## 2024-11-01 RX ORDER — OXCARBAZEPINE 600 MG/1
TABLET, FILM COATED ORAL
COMMUNITY

## 2024-11-01 RX ORDER — ALBUTEROL SULFATE 4 MG/1
TABLET ORAL
COMMUNITY

## 2024-11-01 RX ORDER — ZOLPIDEM TARTRATE 10 MG/1
10 TABLET ORAL DAILY
COMMUNITY

## 2024-11-01 RX ORDER — HYDROCODONE BITARTRATE AND ACETAMINOPHEN 5; 325 MG/1; MG/1
TABLET ORAL
COMMUNITY
Start: 2024-10-15

## 2024-11-01 RX ORDER — BUDESONIDE AND FORMOTEROL FUMARATE DIHYDRATE 160; 4.5 UG/1; UG/1
AEROSOL RESPIRATORY (INHALATION)
COMMUNITY

## 2024-11-01 RX ORDER — METOPROLOL SUCCINATE 25 MG/1
TABLET, EXTENDED RELEASE ORAL
COMMUNITY
Start: 2024-10-14

## 2024-11-01 NOTE — PROGRESS NOTES
Subjective:     Patient ID: Richard Kim is a 59 y.o. male.    Chief Complaint:  Left knee pain, new issue to examiner  History of Present Illness  History of Present Illness    Richard Kim presents to clinic today for evaluation of his left knee.  He is experience pain on and off for quite some time worse within the last several months.  Pain present along the medial aspect of the knee as well as the anterior aspect of the knee, pain with extension and flexion of the knee.  Family present with him today he does report he has been diagnosed with rheumatoid arthritis previously however not taking medication at this time.  He received a few injections previously in years past proximally 2 years ago without a significant symptom resolution of the knee.  He has completed x-ray imaging and MRI outside facility has brought MRI with him available for review rates his comfort a 10 out of a 10 describes pain as throbbing and aching and grinding in nature.  Knee does feel as if it is giving way.  He is experiencing clicking popping grinding sensation worse with Ace and descending stairs the pain also noted with ambulatory activities, transitional activities, seated and standing activities as well as with rest.  He has tried ambulating with assistive devices currently taking hydrocodone for symptom management.  He is also tried ibuprofen every 4 hours without a significant symptom resolution.  Denies any other concerns present.    Social History     Occupational History    Not on file   Tobacco Use    Smoking status: Never     Passive exposure: Never    Smokeless tobacco: Never   Vaping Use    Vaping status: Never Used   Substance and Sexual Activity    Alcohol use: Yes     Comment: OCCASIONALLY    Drug use: No    Sexual activity: Defer      Past Medical History:   Diagnosis Date    Anxiety and depression     Arthritis     Asthma     Bipolar disorder     COPD (chronic obstructive pulmonary disease)     Diabetes  "mellitus     DVT (deep venous thrombosis)     YEARS AGO IN LEFT LEG    Erectile dysfunction     Leg pain, bilateral     Low back pain     Neuropathy     Penile implant failure     Sleep apnea      Past Surgical History:   Procedure Laterality Date    ANKLE OPEN REDUCTION INTERNAL FIXATION Right 1/2/2022    Procedure: ANKLE OPEN REDUCTION INTERNAL FIXATION;  Surgeon: Zeferino Montgomery MD;  Location: [facility];  Service: Orthopedics;  Laterality: Right;    COLONOSCOPY      COLONOSCOPY W/ POLYPECTOMY N/A 12/12/2023    Procedure: COLONOSCOPY WITH POLYPECTOMY;  Surgeon: Evelyn Ontiveros DO;  Location: [facility] OR;  Service: Gastroenterology;  Laterality: N/A;  poor prep, diverticulosis, left colon polyps x2 (hot snare x1)    PENILE PROSTHESIS IMPLANT N/A 3/13/2019    Procedure: PENILE PROSTHESIS PLACEMENT;  Surgeon: Campos Hopkins MD;  Location: [facility];  Service: Urology    PENILE PROSTHESIS IMPLANT N/A 10/29/2020    Procedure: MANIPULATION OF PENILE PROSTHESIS;  Surgeon: Campos Hopkins MD;  Location: [facility];  Service: Urology;  Laterality: N/A;    SINUS SURGERY         Family History   Problem Relation Age of Onset    No Known Problems Mother     No Known Problems Father     Malig Hyperthermia Neg Hx                Objective:  Physical Exam    Vital signs reviewed.   General: No acute distress.  Eyes: conjunctiva clear; pupils equally round and reactive  ENT: external ears and nose atraumatic; oropharynx clear  CV: no peripheral edema  Resp: normal respiratory effort  Skin: no rashes or wounds; normal turgor  Psych: mood and affect appropriate; recent and remote memory intact    Vitals:    11/01/24 1054   BP: 133/80   Pulse: 88   Weight: 80.7 kg (178 lb)   Height: 175.3 cm (69\")         11/01/24  1054   Weight: 80.7 kg (178 lb)     Body mass index is 26.29 kg/m².      Left Knee Exam     Tenderness   The patient is experiencing tenderness in the medial joint line, patella and lateral " joint line.    Range of Motion   Extension:  0   Flexion:  120     Tests   Latrell:  Medial - positive Lateral - negative  Varus: negative Valgus: negative  Lachman:  Anterior - 1+      Drawer:  Anterior - trace       Patellar apprehension: positive    Other   Erythema: absent  Sensation: normal  Pulse: present  Swelling: moderate  Effusion: no effusion present    Comments:  Positive active patellar compression test  Positive crepitus throughout arc of motion  Quad strength 5 out of 5             Physical Exam      Imaging:          Independently reviewed MRI completed outside facility left knee complex tearing medial meniscus with full-thickness radial tear tear posterior horn root junction horizontal undersurface tear of the meniscal body posterior horn flipped fragment the tibial recess.  Mucoid degeneration of the ACL without obvious tear, severe tricompartmental arthritis with extensive cartilage loss subchondral marrow changes along the patellofemoral and medial compartments full-thickness cartilage loss medial femoral condyle with loose body the posterior joint  Left Knee X-Ray  Indication: Pain    AP, Lateral, and [place] views    Findings:  No fracture  No bony lesion  Normal soft tissues  Tricompartmental osteoarthritis     prior studies were available for comparison MRI from outside facility  Assessment:        1. Chronic pain of left knee    2. Primary osteoarthritis of left knee         Assessment & Plan      Plan:  Long discussion with patient and family regarding treatment options.  We did discuss options including repeat corticosteroid injection again which he has completed in the past without any significant symptom resolution at least long-term symptom resolution.  We also discussed surgical intervention which is certainly a possibility.  He had prior recent GI infection which she is completed the oral antibiotic for we we did discuss the importance of ensuring he is in good health prior to any  elective procedure to prevent complications.  He verbalized understanding of all information he does wish to proceed with discussion for surgical intervention with Dr. Montgomery.  He is not currently the utilizing ETOH per patient and family discussed the importance of avoidance to promote healing and prevent further injury before and after any elective procedure.  We also discussed the importance of diligently participating with physical therapy to prevent any complications.  Again he verbalized understanding of all information commitment with physical therapy.  Denies any other concerns present.  Orders:  Orders Placed This Encounter   Procedures    XR Knee 3+ View With [place] Left     No orders of the defined types were placed in this encounter.          I ordered and reviewed the MAR today.       Dragon dictation utilized  BMI is >= 30 and <35. (Class 1 Obesity). The following options were offered after discussion;: weight loss educational material (shared in after visit summary)

## 2024-11-04 ENCOUNTER — OFFICE VISIT (OUTPATIENT)
Dept: ORTHOPEDIC SURGERY | Facility: CLINIC | Age: 59
End: 2024-11-04
Payer: MEDICARE

## 2024-11-04 VITALS
DIASTOLIC BLOOD PRESSURE: 84 MMHG | HEIGHT: 69 IN | BODY MASS INDEX: 26.36 KG/M2 | WEIGHT: 178 LBS | SYSTOLIC BLOOD PRESSURE: 150 MMHG | HEART RATE: 93 BPM

## 2024-11-04 DIAGNOSIS — M25.562 CHRONIC PAIN OF LEFT KNEE: ICD-10-CM

## 2024-11-04 DIAGNOSIS — G89.29 CHRONIC PAIN OF LEFT KNEE: ICD-10-CM

## 2024-11-04 DIAGNOSIS — M17.12 PRIMARY OSTEOARTHRITIS OF LEFT KNEE: Primary | ICD-10-CM

## 2024-11-04 PROCEDURE — 1159F MED LIST DOCD IN RCRD: CPT | Performed by: ORTHOPAEDIC SURGERY

## 2024-11-04 PROCEDURE — 99214 OFFICE O/P EST MOD 30 MIN: CPT | Performed by: ORTHOPAEDIC SURGERY

## 2024-11-04 PROCEDURE — 1160F RVW MEDS BY RX/DR IN RCRD: CPT | Performed by: ORTHOPAEDIC SURGERY

## 2024-11-04 NOTE — PROGRESS NOTES
Subjective:     Patient ID: Richard Kim is a 59 y.o. male.    Chief Complaint:  Left knee pain, new issue to examiner  History of Present Illness  History of Present Illness  The patient presents to the clinic today for evaluation of left knee pain.    He has been experiencing chronic left knee pain, which has worsened over the past 6 to 8 months. The pain began approximately 2 to 3 years ago, with no known injury preceding its onset. It is localized to the medial and anterior aspects of his left knee and is exacerbated by maximal extension, deep flexion, prolonged weightbearing, walking, rising from a seated position, and stair climbing. He describes the pain as aching, rating it as 9 to 10 out of 10, and accompanied by a grinding sensation. He also reports associated clicking, popping, and instability in his knee.    Despite attempts at relief through intra-articular injections, anti-inflammatory medications, and home exercises for greater than 12 weeks, he has only experienced temporary improvement. Currently, he is taking ibuprofen every 4 hours to manage his pain.    He reports no significant radiation of the pain, hip or groin pain, fevers, chills, or sweats.      Social History     Occupational History    Not on file   Tobacco Use    Smoking status: Never     Passive exposure: Never    Smokeless tobacco: Never   Vaping Use    Vaping status: Never Used   Substance and Sexual Activity    Alcohol use: Yes     Comment: OCCASIONALLY    Drug use: No    Sexual activity: Defer      Past Medical History:   Diagnosis Date    Anxiety and depression     Arthritis     Asthma     Bipolar disorder     COPD (chronic obstructive pulmonary disease)     Diabetes mellitus     DVT (deep venous thrombosis)     YEARS AGO IN LEFT LEG    Erectile dysfunction     Leg pain, bilateral     Low back pain     Neuropathy     Penile implant failure     Sleep apnea      Past Surgical History:   Procedure Laterality Date    ANKLE OPEN  "REDUCTION INTERNAL FIXATION Right 1/2/2022    Procedure: ANKLE OPEN REDUCTION INTERNAL FIXATION;  Surgeon: Zeferino Montgomery MD;  Location: [facility] OR;  Service: Orthopedics;  Laterality: Right;    COLONOSCOPY      COLONOSCOPY W/ POLYPECTOMY N/A 12/12/2023    Procedure: COLONOSCOPY WITH POLYPECTOMY;  Surgeon: Evelyn Ontiveros DO;  Location: [facility] OR;  Service: Gastroenterology;  Laterality: N/A;  poor prep, diverticulosis, left colon polyps x2 (hot snare x1)    PENILE PROSTHESIS IMPLANT N/A 3/13/2019    Procedure: PENILE PROSTHESIS PLACEMENT;  Surgeon: Campos Hopkins MD;  Location: [facility] OR;  Service: Urology    PENILE PROSTHESIS IMPLANT N/A 10/29/2020    Procedure: MANIPULATION OF PENILE PROSTHESIS;  Surgeon: Campos Hopkins MD;  Location: [facility] OR;  Service: Urology;  Laterality: N/A;    SINUS SURGERY         Family History   Problem Relation Age of Onset    No Known Problems Mother     No Known Problems Father     Malig Hyperthermia Neg Hx          Review of Systems        Objective:  Vitals:    11/04/24 1317   BP: 150/84   Pulse: 93   Weight: 80.7 kg (178 lb)   Height: 175.3 cm (69\")         11/04/24  1317   Weight: 80.7 kg (178 lb)     Body mass index is 26.29 kg/m².  Physical Exam    Vital signs reviewed.   General: No acute distress, alert and oriented  Eyes: conjunctiva clear; pupils equally round and reactive  ENT: external ears and nose atraumatic; oropharynx clear  CV: no peripheral edema  Resp: normal respiratory effort  Skin: no rashes or wounds; normal turgor  Psych: mood and affect appropriate; recent and remote memory intact          Physical Exam  Left knee demonstrates an active range of motion from 3 to 110 degrees, with 4 out of 5 strength on flexion and extension. Moderate effusion is present, along with maximal tenderness to palpation at the medial joint line and both medial and lateral patellar facets. Positive active patellar compression test is noted, along " with moderate effusion. Latrell's exam is positive with pain along the medial joint line and a mild click. Stability is observed to varus and valgus stress at 3 and 30 degrees, with a grade 1A Lachman. Anterior and posterior drawer tests are negative. No hip pain is reported on logroll or [facility] exam. Overall varus alignment is observed. Sensation to light touch is positive in all distributions of the left foot, symmetric to the right. Brisk cap refill is noted in all digits, with a 1+ dorsalis pedis pulse.         Imaging:    XR Knee 3+ View With [place] Left    Result Date: 11/1/2024  Ordering physician's impression is located in the Encounter Note dated 11/01/24.              Review of outside MRI as well as x-rays of the left knee indicates extensive full-thickness chondral loss to both the medial and patellofemoral compartments, moderate joint space narrowing noted on standing x-rays from office with mild reactive osteophyte formation appreciated.  Slight varus alignment.    Assessment:        1. Primary osteoarthritis of left knee    2. Chronic pain of left knee           Plan:          Assessment & Plan  1. Left knee pain.  He presents with chronic left knee pain, particularly worse over the last 6 to 8 months, with no history of injury. The pain is localized to the medial and anterior aspect of the knee, exacerbated by maximal extension, deep flexion, prolonged weightbearing, walking, getting up from a seated position, and stair climbing. The pain is rated as 9-10 out of 10, aching in nature, with associated grinding, clicking, popping, and giving way.     He has failed intra-articular injections, anti-inflammatory medications, and home exercises.     On examination, there is moderate effusion, maximal tenderness to palpation of the medial joint line and patellar facets, positive active patellar compression test, and positive Latrell's exam with pain along the medial joint line.     Given the  significant pain, limited motion, and failure of conservative treatments, along with advanced degenerative changes on x-rays and MRI, he opts to proceed with left total knee arthroplasty.    I reviewed anatomy and a model of a total knee arthroplasty, as well as typical postoperative recovery of 6-12 months before maximal recovery, and possible need for rehabilitation stay after hospitalization. We also discussed risks, benefits, and alternatives of procedure with risks including but not limited to neurovascular damage, bleeding, infection, malalignment, chronic pian, failure of implants, osteolysis, loosening of implants, loss of motion, weakness, stiffness, instability, DVT, pulmonary embolus, death, stroke, complex regional pain syndrome, myocardial infarction, and need for additional procedures. Patient understood all these and had all questions answered before consenting to the procedure. No guarantees were given in regards to results from the surgery. We will have patient medically optimized by their primary care physician and plan on proceeding with surgery at next available date.        An observation stay and use of the iASSIST device are planned.     He is diabetic but unsure of his last hemoglobin A1c. It is important to monitor his blood sugar levels closely, especially in the perioperative period. He should continue his current diabetic management and follow up with his primary care provider for further evaluation of his hemoglobin A1c. He has no history of DVT or pulmonary embolus and reports no cardiac history.     He has a history of MRSA as noted in his electronic medical record. Appropriate precautions and antibiotic prophylaxis will be considered during his surgical procedure to prevent any potential infection.      Richard Kim was in agreement with plan and had all questions answered.     Orders:  No orders of the defined types were placed in this encounter.      Medications:  No orders of  the defined types were placed in this encounter.      Followup:  No follow-ups on file.    Diagnoses and all orders for this visit:    1. Primary osteoarthritis of left knee (Primary)    2. Chronic pain of left knee                  Dictated utilizing Dragon dictation     Patient or patient representative verbalized consent for the use of Ambient Listening during the visit with  Zeferino Montgomery MD for chart documentation. 11/13/2024  13:48 EST

## 2024-11-06 ENCOUNTER — TELEPHONE (OUTPATIENT)
Dept: ORTHOPEDIC SURGERY | Facility: CLINIC | Age: 59
End: 2024-11-06

## 2024-11-06 NOTE — TELEPHONE ENCOUNTER
LM ON PATIENT VM. HUB OK TO RELAY MESSAGE IF THEY CALL BACK  PATIENTS  GENERALLY HEAR FROM US ABOUT SCHEDULING ELECTIVE PROCEDURES WITH IN 10-12 BUSINESS DAYS FROM THEIR LAST OFFICE VISIT. PATIENT WAS LAST SEEN AND DISCUSSED SX ON 11/4.

## 2024-11-06 NOTE — TELEPHONE ENCOUNTER
Provider: GASPER    Caller: BETY     Relationship to Patient: S.O.    Phone Number: [phone]    Reason for Call: BETY IS ASKING FOR AN UPDATE ON GETTING PATIENT'S KNEE SURGERY SCHEDULED.

## 2024-11-13 ENCOUNTER — TELEPHONE (OUTPATIENT)
Dept: ORTHOPEDIC SURGERY | Facility: CLINIC | Age: 59
End: 2024-11-13

## 2024-11-13 PROBLEM — M17.12 PRIMARY OSTEOARTHRITIS OF LEFT KNEE: Status: ACTIVE | Noted: 2024-11-04

## 2024-11-13 PROBLEM — Z96.659 S/P TOTAL KNEE ARTHROPLASTY: Status: ACTIVE | Noted: 2024-11-13

## 2024-11-13 RX ORDER — ACETAMINOPHEN 325 MG/1
1000 TABLET ORAL ONCE
OUTPATIENT
Start: 2024-11-13 | End: 2024-11-13

## 2024-11-13 RX ORDER — PREGABALIN 150 MG/1
150 CAPSULE ORAL ONCE
OUTPATIENT
Start: 2024-11-13 | End: 2024-11-13

## 2024-11-13 NOTE — TELEPHONE ENCOUNTER
Provider: DR JACKMAN     Caller: Dinora Caceres    Relationship to Patient: Emergency Contact    Phone Number: [phone]    Reason for Call: PATIENT WANTS TO KNOW IF HE COULD GET AN INJECTION IN HIS LEFT KNEE SINCE HE HAS A CAST ON HIS LEG FOR AN INFECTED WOUND ON THE BOTTOM OF HIS LEFT FOOT.  PLEASE ADVISE.

## 2024-11-13 NOTE — TELEPHONE ENCOUNTER
Went to call patient about scheduling L TKA and saw this note in chart. Discussed with Dr Montgomery that at this time the wound would need to completely heal before he'd consider doing a joint replacement. Dr Montgomery said an inj in the knee would be okay at this time, and to schedule with Kim. Called patient and let him know. He is scheduled for inj on Friday with Kim Woody and is aware surgery will be delayed 3 months due to the inj, and that wound must be completely healed as well.

## 2024-11-15 ENCOUNTER — CLINICAL SUPPORT (OUTPATIENT)
Dept: ORTHOPEDIC SURGERY | Facility: CLINIC | Age: 59
End: 2024-11-15
Payer: MEDICARE

## 2024-11-15 VITALS — HEIGHT: 69 IN | BODY MASS INDEX: 26.36 KG/M2 | WEIGHT: 178 LBS

## 2024-11-15 DIAGNOSIS — M17.12 PRIMARY OSTEOARTHRITIS OF LEFT KNEE: Primary | ICD-10-CM

## 2024-11-15 RX ORDER — LIDOCAINE HYDROCHLORIDE 10 MG/ML
8 INJECTION, SOLUTION EPIDURAL; INFILTRATION; INTRACAUDAL; PERINEURAL
Status: COMPLETED | OUTPATIENT
Start: 2024-11-15 | End: 2024-11-15

## 2024-11-15 RX ORDER — TRIAMCINOLONE ACETONIDE 40 MG/ML
80 INJECTION, SUSPENSION INTRA-ARTICULAR; INTRAMUSCULAR
Status: COMPLETED | OUTPATIENT
Start: 2024-11-15 | End: 2024-11-15

## 2024-11-15 RX ADMIN — LIDOCAINE HYDROCHLORIDE 8 ML: 10 INJECTION, SOLUTION EPIDURAL; INFILTRATION; INTRACAUDAL; PERINEURAL at 12:59

## 2024-11-15 RX ADMIN — TRIAMCINOLONE ACETONIDE 80 MG: 40 INJECTION, SUSPENSION INTRA-ARTICULAR; INTRAMUSCULAR at 12:59

## 2024-11-15 NOTE — PROGRESS NOTES
- Large Joint Arthrocentesis: L knee on 11/15/2024 12:59 PM  Indications: pain  Details: 22 G needle, anterolateral approach  Medications: 80 mg triamcinolone acetonide 40 MG/ML; 8 mL lidocaine PF 1% 1 %  Outcome: tolerated well, no immediate complications  Procedure, treatment alternatives, risks and benefits explained, specific risks discussed. Consent was given by the patient. Immediately prior to procedure a time out was called to verify the correct patient, procedure, equipment, support staff and site/side marked as required. Patient was prepped and draped in the usual sterile fashion.     Discussed plan of care with patient. Wishes to proceed with corticosteroid injection left knee.  I do recommend application of ice injection site this evening.  May take 3 to 7 days before he notices significant symptom resolution.  He verbalized understanding of all information agrees with plan of care.  Denies other concerns present.

## 2024-12-04 DIAGNOSIS — Z86.0100 HISTORY OF COLON POLYPS: Primary | ICD-10-CM

## 2024-12-16 ENCOUNTER — ANESTHESIA EVENT (OUTPATIENT)
Dept: PERIOP | Facility: HOSPITAL | Age: 59
End: 2024-12-16
Payer: MEDICARE

## 2024-12-17 ENCOUNTER — HOSPITAL ENCOUNTER (OUTPATIENT)
Facility: HOSPITAL | Age: 59
Setting detail: HOSPITAL OUTPATIENT SURGERY
Discharge: HOME | End: 2024-12-17
Attending: SURGERY | Admitting: SURGERY
Payer: MEDICARE

## 2024-12-17 ENCOUNTER — ANESTHESIA (OUTPATIENT)
Dept: PERIOP | Facility: HOSPITAL | Age: 59
End: 2024-12-17
Payer: MEDICARE

## 2024-12-17 VITALS
DIASTOLIC BLOOD PRESSURE: 93 MMHG | RESPIRATION RATE: 16 BRPM | SYSTOLIC BLOOD PRESSURE: 165 MMHG | OXYGEN SATURATION: 98 % | HEART RATE: 85 BPM | WEIGHT: 180.6 LBS | TEMPERATURE: 98.3 F | BODY MASS INDEX: 26.67 KG/M2

## 2024-12-17 DIAGNOSIS — Z86.0100 HISTORY OF COLON POLYPS: ICD-10-CM

## 2024-12-17 LAB — GLUCOSE BLDC GLUCOMTR-MCNC: 120 MG/DL (ref 70–130)

## 2024-12-17 PROCEDURE — 25010000002 LIDOCAINE PF 1% 1 % SOLUTION

## 2024-12-17 PROCEDURE — 88305 TISSUE EXAM BY PATHOLOGIST: CPT | Performed by: SURGERY

## 2024-12-17 PROCEDURE — 25010000002 PROPOFOL 200 MG/20ML EMULSION

## 2024-12-17 PROCEDURE — 82948 REAGENT STRIP/BLOOD GLUCOSE: CPT

## 2024-12-17 RX ORDER — ONDANSETRON 2 MG/ML
4 INJECTION INTRAMUSCULAR; INTRAVENOUS ONCE AS NEEDED
Status: DISCONTINUED | OUTPATIENT
Start: 2024-12-17 | End: 2024-12-17 | Stop reason: HOSPADM

## 2024-12-17 RX ORDER — SODIUM CHLORIDE, SODIUM LACTATE, POTASSIUM CHLORIDE, CALCIUM CHLORIDE 600; 310; 30; 20 MG/100ML; MG/100ML; MG/100ML; MG/100ML
100 INJECTION, SOLUTION INTRAVENOUS ONCE
Status: DISCONTINUED | OUTPATIENT
Start: 2024-12-17 | End: 2024-12-17 | Stop reason: HOSPADM

## 2024-12-17 RX ORDER — SODIUM CHLORIDE 0.9 % (FLUSH) 0.9 %
10 SYRINGE (ML) INJECTION AS NEEDED
Status: DISCONTINUED | OUTPATIENT
Start: 2024-12-17 | End: 2024-12-17 | Stop reason: HOSPADM

## 2024-12-17 RX ORDER — SODIUM CHLORIDE 9 MG/ML
40 INJECTION, SOLUTION INTRAVENOUS AS NEEDED
Status: DISCONTINUED | OUTPATIENT
Start: 2024-12-17 | End: 2024-12-17 | Stop reason: HOSPADM

## 2024-12-17 RX ORDER — SODIUM CHLORIDE 9 MG/ML
INJECTION, SOLUTION INTRAMUSCULAR; INTRAVENOUS; SUBCUTANEOUS AS NEEDED
Status: DISCONTINUED | OUTPATIENT
Start: 2024-12-17 | End: 2024-12-17 | Stop reason: SURG

## 2024-12-17 RX ORDER — LIDOCAINE HYDROCHLORIDE 10 MG/ML
INJECTION, SOLUTION EPIDURAL; INFILTRATION; INTRACAUDAL; PERINEURAL AS NEEDED
Status: DISCONTINUED | OUTPATIENT
Start: 2024-12-17 | End: 2024-12-17 | Stop reason: SURG

## 2024-12-17 RX ORDER — LIDOCAINE HYDROCHLORIDE 10 MG/ML
0.5 INJECTION, SOLUTION EPIDURAL; INFILTRATION; INTRACAUDAL; PERINEURAL ONCE AS NEEDED
Status: DISCONTINUED | OUTPATIENT
Start: 2024-12-17 | End: 2024-12-17 | Stop reason: HOSPADM

## 2024-12-17 RX ORDER — PROPOFOL 10 MG/ML
INJECTION, EMULSION INTRAVENOUS AS NEEDED
Status: DISCONTINUED | OUTPATIENT
Start: 2024-12-17 | End: 2024-12-17 | Stop reason: SURG

## 2024-12-17 RX ORDER — SODIUM CHLORIDE 0.9 % (FLUSH) 0.9 %
10 SYRINGE (ML) INJECTION EVERY 12 HOURS SCHEDULED
Status: DISCONTINUED | OUTPATIENT
Start: 2024-12-17 | End: 2024-12-17 | Stop reason: HOSPADM

## 2024-12-17 RX ADMIN — PROPOFOL 380 MG: 10 INJECTION, EMULSION INTRAVENOUS at 09:05

## 2024-12-17 RX ADMIN — SODIUM CHLORIDE 5 ML: 9 INJECTION, SOLUTION INTRAMUSCULAR; INTRAVENOUS; SUBCUTANEOUS at 09:25

## 2024-12-17 RX ADMIN — LIDOCAINE HYDROCHLORIDE 30 MG: 10 INJECTION, SOLUTION EPIDURAL; INFILTRATION; INTRACAUDAL; PERINEURAL at 09:04

## 2024-12-17 NOTE — H&P
General Surgery      Patient Care Team:  Parris Glover MD as PCP - General (Family Medicine)    CHIEF COMPLAINT: Colon cancer screening    HISTORY OF PRESENT ILLNESS:    Richard is here for a colonoscopy.  We attempted a colonoscopy last year but his prep was poor and visualization was difficult so he recommended a 1 year repeat.  He had colon polyps removed at that time.  He completed a 2-day prep and says it went very well.  He has no complaints.      Past Medical History:   Diagnosis Date    Anxiety and depression     Arthritis     Asthma     Bipolar disorder     COPD (chronic obstructive pulmonary disease)     Diabetes mellitus     DVT (deep venous thrombosis)     YEARS AGO IN LEFT LEG    Erectile dysfunction     Leg pain, bilateral     Low back pain     Neuropathy     Penile implant failure     Sleep apnea      Past Surgical History:   Procedure Laterality Date    ANKLE OPEN REDUCTION INTERNAL FIXATION Right 1/2/2022    Procedure: ANKLE OPEN REDUCTION INTERNAL FIXATION;  Surgeon: Zeferino Montgomery MD;  Location: [facility];  Service: Orthopedics;  Laterality: Right;    COLONOSCOPY      COLONOSCOPY W/ POLYPECTOMY N/A 12/12/2023    Procedure: COLONOSCOPY WITH POLYPECTOMY;  Surgeon: Evelyn Ontiveros DO;  Location: [facility] OR;  Service: Gastroenterology;  Laterality: N/A;  poor prep, diverticulosis, left colon polyps x2 (hot snare x1)    PENILE PROSTHESIS IMPLANT N/A 3/13/2019    Procedure: PENILE PROSTHESIS PLACEMENT;  Surgeon: Campos Hopkins MD;  Location: [facility] OR;  Service: Urology    PENILE PROSTHESIS IMPLANT N/A 10/29/2020    Procedure: MANIPULATION OF PENILE PROSTHESIS;  Surgeon: Campos Hopkins MD;  Location: [facility] OR;  Service: Urology;  Laterality: N/A;    SINUS SURGERY       Family History   Problem Relation Age of Onset    No Known Problems Mother     No Known Problems Father     Malig Hyperthermia Neg Hx      Social History     Tobacco Use    Smoking status: Never      Passive exposure: Never    Smokeless tobacco: Never   Vaping Use    Vaping status: Never Used   Substance Use Topics    Alcohol use: Yes     Comment: OCCASIONALLY    Drug use: No     Medications Prior to Admission   Medication Sig Dispense Refill Last Dose/Taking    albuterol sulfate  (90 Base) MCG/ACT inhaler Inhale 2 puffs Every 4 (Four) Hours As Needed for Wheezing.   12/17/2024 Morning    Cariprazine HCl (VRAYLAR) 3 MG capsule capsule Take 1 capsule by mouth Every Night.   12/16/2024    ESCITALOPRAM OXALATE PO Take  by mouth.   12/16/2024    metoprolol succinate XL (TOPROL-XL) 25 MG 24 hr tablet    12/16/2024 Morning    OXcarbazepine (TRILEPTAL) 600 MG tablet    12/16/2024    pregabalin (LYRICA) 50 MG capsule Take 1 capsule by mouth 3 (Three) Times a Day.   12/16/2024    tiZANidine (ZANAFLEX) 4 MG tablet Take 1 tablet by mouth At Night As Needed for Muscle Spasms.   12/16/2024    zolpidem (AMBIEN) 10 MG tablet Take 1 tablet by mouth Daily.   12/16/2024    metFORMIN ER (GLUCOPHAGE-XR) 500 MG 24 hr tablet Take 2 tablets by mouth 2 (Two) Times a Day.       Trulicity 4.5 MG/0.5ML solution auto-injector    12/3/2024     Allergies:  Aspirin, Banana, and Penicillins    REVIEW OF SYSTEMS:  Please see the above history of present illness for pertinent positives and negatives.  The remainder of the patient's systems have been reviewed and are negative.     Vital Signs  Temp:  [98.3 °F (36.8 °C)] 98.3 °F (36.8 °C)  Heart Rate:  [80] 80  Resp:  [16] 16  BP: (185)/(107) 185/107    Flowsheet Rows      Flowsheet Row First Filed Value   Admission Height --   Admission Weight 81.9 kg (180 lb 9.6 oz) Documented at 12/17/2024 0810             Physical Exam:  Physical Exam   Constitutional: Patient appears well-developed and well-nourished and in no acute distress   HEENT:   Head: Normocephalic and atraumatic.   Musculoskeletal: Normal posture.  Extremities: No edema.   Neurological: Patient is alert and  oriented.  Psychological:   Mood and behavior appropriate.  Skin: Skin is warm and dry.    Debilities/Disabilities Identified: None    Emotional Behavior: Appropriate           Results Review:    I reviewed the patient's new clinical results.  Lab Results (most recent)       Procedure Component Value Units Date/Time    POC Glucose Once [[id number]]  (Normal) Collected: 12/17/24 0812    Specimen: Blood Updated: 12/17/24 0822     Glucose 120 mg/dL             Imaging Results (Most Recent)       None              ECG/EMG Results (most recent)       None              Assessment & Plan     History of colon polyps  Colon cancer screening  I discussed with Richard and his wife performing colonoscopy today.  All of their questions were answered and he is willing to proceed.      Evelyn Ontiveros DO  12/17/24  08:24 EST

## 2024-12-17 NOTE — ANESTHESIA PREPROCEDURE EVALUATION
Anesthesia Evaluation     Patient summary reviewed and Nursing notes reviewed   no history of anesthetic complications:   NPO Solid Status: > 8 hours  NPO Liquid Status: > 8 hours           Airway   Mallampati: II  TM distance: >3 FB  Neck ROM: full  Possible difficult intubation  Dental    (+) poor dentition and edentulous    Pulmonary - normal exam    breath sounds clear to auscultation  (+) COPD moderate, asthma (uses rescue inhaler 10x per day),sleep apnea  (-) shortness of breath, not a smoker  Cardiovascular - normal exam  Exercise tolerance: poor (<4 METS)    ECG reviewed  Patient on routine beta blocker and Beta blocker given within 24 hours of surgery  Rhythm: regular  Rate: normal    (+) DVT resolved, hyperlipidemia  (-) valvular problems/murmurs, angina    ROS comment: 12 lead EKG  Sinus rhythm  No change from prior tracing  Electronically Signed By: Patricio Marrufo ([facility]) 03-Jan-2022 08:09:13  Date and Time of Study: 2022-01-02 05:35:58    TTE 10/2020  Calculated left ventricular EF = 55% Estimated left ventricular EF was in agreement with the calculated left ventricular EF. Normal left ventricular cavity size and wall thickness noted. All left ventricular wall segments contract normally. Left ventricular diastolic function was normal.    Neuro/Psych  (+) dizziness/light headedness (with quick position changes), psychiatric history Bipolar, Anxiety and Depression  GI/Hepatic/Renal/Endo    (+) diabetes mellitus type 2 well controlled  (-) no thyroid disorder    Musculoskeletal     (-) neck pain, neck stiffness  Abdominal     Abdomen: soft.   Substance History   (+) alcohol use (beer x5 per week)  (-) drug use     OB/GYN          Other   arthritis,                   Anesthesia Plan    ASA 3     MAC   total IV anesthesia  intravenous induction     Anesthetic plan, risks, benefits, and alternatives have been provided, discussed and informed consent has been obtained with: patient and spouse/significant  other.    Use of blood products discussed with patient and spouse/significant other  Consented to blood products.    Plan discussed with CRNA.    CODE STATUS:

## 2024-12-17 NOTE — OP NOTE
Colonoscopy Procedure Note      Pre-operative Diagnosis: Colon cancer screening    Post-operative Diagnosis: Transverse colon polyp, sigmoid colon polyp, diverticulosis    Procedure: Colonoscopy with polypectomy using hot snare and cold forceps    Surgeon: Rama    Anesthetic: MAC per Nicki Silverman CRNA    Estimated Blood Loss: Minimal    Complications: None    Indications: Colon cancer screening    Findings/Treatments:   Transverse colon polyp-removed using cold forceps  Sigmoid colon polyp-removed with hot snare   Diverticulosis-increase fiber and water intake by using Metamucil       Scope Withdrawal Time: 6 minutes      Recommendations: Await pathology      Procedure Details     After discussing the benefits and risks of the procedure with the patient, not limited to but including:  Bleeding, infection, perforation, aspiration; informed consent was signed.  The patient was taken into the endoscopy room at [facility] and placed in the left lateral decubitus position.  MAC anesthesia was given with appropriate cardiopulmonary monitoring.  A rectal exam was performed.  Sphincter tone was normal.  The colonoscope was then inserted and carefully advanced to the cecum while visualizing the mucosa.  The cecum was identified by the ileocecal valve and the orifice of the appendix.  Once in the cecum the scope was slowly withdrawn while carefully evaluating the mucosa and deflating air.  Richard had a transverse colon polyp removed with cold forceps.  He had a large sigmoid colon polyp using hot snare.  He had diverticulosis of the sigmoid colon.  Once the rectum was evaluated the scope was removed and Richard was taken to the recovery area in stable postoperative condition having tolerated his procedure well.    Evelyn Ontiveros DO

## 2024-12-17 NOTE — ANESTHESIA POSTPROCEDURE EVALUATION
Patient: Richard Kim    Procedure Summary       Date: 12/17/24 Room / Location: [facility] ENDOSCOPY 1 /  [facility] OR    Anesthesia Start: 0902 Anesthesia Stop: 0930    Procedure: COLONOSCOPY WITH POLYPECTOMY Diagnosis:       History of colon polyps      (History of colon polyps [Z86.0100])    Surgeons: Evelyn Ontiveros DO Provider: Nicki Silverman CRNA    Anesthesia Type: MAC ASA Status: 3            Anesthesia Type: MAC    Vitals  Vitals Value Taken Time   /82 12/17/24 0940   Temp     Pulse 78 12/17/24 0947   Resp 16 12/17/24 0932   SpO2 97 % 12/17/24 0947   Vitals shown include unfiled device data.        Post Anesthesia Care and Evaluation    Patient location during evaluation: PHASE II  Patient participation: complete - patient participated  Level of consciousness: awake  Pain management: adequate    Airway patency: patent  Anesthetic complications: No anesthetic complications  PONV Status: none  Cardiovascular status: acceptable  Respiratory status: acceptable  Hydration status: acceptable

## 2024-12-18 LAB
CYTO UR: NORMAL
LAB AP CASE REPORT: NORMAL
PATH REPORT.FINAL DX SPEC: NORMAL
PATH REPORT.GROSS SPEC: NORMAL

## 2025-05-08 ENCOUNTER — PRE-ADMISSION TESTING (OUTPATIENT)
Dept: PREADMISSION TESTING | Facility: HOSPITAL | Age: 60
End: 2025-05-08
Payer: MEDICARE

## 2025-05-08 VITALS
HEIGHT: 69 IN | HEART RATE: 86 BPM | RESPIRATION RATE: 16 BRPM | WEIGHT: 180.6 LBS | OXYGEN SATURATION: 96 % | BODY MASS INDEX: 26.75 KG/M2 | SYSTOLIC BLOOD PRESSURE: 96 MMHG | DIASTOLIC BLOOD PRESSURE: 62 MMHG

## 2025-05-08 LAB
ANION GAP SERPL CALCULATED.3IONS-SCNC: 11.1 MMOL/L (ref 5–15)
BUN SERPL-MCNC: 14 MG/DL (ref 8–23)
BUN/CREAT SERPL: 15.9 (ref 7–25)
CALCIUM SPEC-SCNC: 9.3 MG/DL (ref 8.6–10.5)
CHLORIDE SERPL-SCNC: 102 MMOL/L (ref 98–107)
CO2 SERPL-SCNC: 23.9 MMOL/L (ref 22–29)
CREAT SERPL-MCNC: 0.88 MG/DL (ref 0.76–1.27)
DEPRECATED RDW RBC AUTO: 49 FL (ref 37–54)
EGFRCR SERPLBLD CKD-EPI 2021: 97.8 ML/MIN/1.73
ERYTHROCYTE [DISTWIDTH] IN BLOOD BY AUTOMATED COUNT: 13.9 % (ref 12.3–15.4)
GLUCOSE SERPL-MCNC: 248 MG/DL (ref 65–99)
HCT VFR BLD AUTO: 45.4 % (ref 37.5–51)
HGB BLD-MCNC: 15.2 G/DL (ref 13–17.7)
MCH RBC QN AUTO: 32.4 PG (ref 26.6–33)
MCHC RBC AUTO-ENTMCNC: 33.5 G/DL (ref 31.5–35.7)
MCV RBC AUTO: 96.8 FL (ref 79–97)
PLATELET # BLD AUTO: 232 10*3/MM3 (ref 140–450)
PMV BLD AUTO: 9.3 FL (ref 6–12)
POTASSIUM SERPL-SCNC: 4.8 MMOL/L (ref 3.5–5.2)
QT INTERVAL: 371 MS
QTC INTERVAL: 447 MS
RBC # BLD AUTO: 4.69 10*6/MM3 (ref 4.14–5.8)
SODIUM SERPL-SCNC: 137 MMOL/L (ref 136–145)
WBC NRBC COR # BLD AUTO: 6.79 10*3/MM3 (ref 3.4–10.8)

## 2025-05-08 PROCEDURE — 85027 COMPLETE CBC AUTOMATED: CPT

## 2025-05-08 PROCEDURE — 93010 ELECTROCARDIOGRAM REPORT: CPT | Performed by: INTERNAL MEDICINE

## 2025-05-08 PROCEDURE — 93005 ELECTROCARDIOGRAM TRACING: CPT

## 2025-05-08 PROCEDURE — 80048 BASIC METABOLIC PNL TOTAL CA: CPT

## 2025-05-08 PROCEDURE — 36415 COLL VENOUS BLD VENIPUNCTURE: CPT

## 2025-05-08 NOTE — DISCHARGE INSTRUCTIONS
PRE-ADMISSION TESTING INSTRUCTIONS FOR ADULTS    Take these medications the morning of surgery with a small sip of water:  use inhaler, take lexapro, metoprolol, trileptal, and lyrica      Do not take any insulin or diabetes medications the morning of surgery.      No aspirin, advil, aleve, ibuprofen, naproxen, diet pills, decongestants, or herbal/vitamins for a week prior to surgery.       Hold trulicity until after surgery    Tylenol/Acetaminophen is okay to take if needed.    General Instructions:    DO NOT EAT SOLID FOOD AFTER MIDNIGHT THE NIGHT BEFORE SURGERY. No gum, mints, or hard candy after midnight the night before surgery.  You may drink clear liquids the day of surgery up until 2 hours before your arrival time.  Clear liquids are liquids you can see through. Nothing RED in color.    Plain water    Sports drinks      Gelatin (Jell-O)  Fruit juices without pulp such as white grape juice and apple juice  Popsicles that contain no fruit or yogurt  Tea or coffee (no cream or milk added)    It is beneficial for you to have a clear drink that contains carbohydrates 2 hours before your arrival time.  We suggest a 20 ounce bottle of Gatorade or Powerade for non-diabetic patients or a 20 ounce bottle of Gatorade Zero or Powerade Zero for diabetic patients.     Patients who avoid smoking, chewing tobacco and alcohol for 4 weeks prior to surgery have a reduced risk of post-operative complications.  If at all possible, quit smoking as many days before surgery as you can.    Do not smoke, use chewing tobacco or drink alcohol the day of surgery    Bring your C-PAP/ BI-PAP machine if you use one.  Wear clean comfortable clothes.  Do not wear contact lenses, lotion, deodorant, or make-up.  Bring a case for your glasses if applicable. You may brush your teeth the morning of surgery.  You may wear dentures/partials, do not put adhesive/glue on them.  Leave all other jewelry and valuables at home.      Preventing a Surgical  Site Infection:    Shower the night before and on the morning of surgery using the chlorhexidine soap you were given.  Use a clean washcloth with the soap.  Place clean sheets on your bed after showering the night before surgery. Do not use the CHG soap on your hair, face, or private areas. Wash your body gently for five (5) minutes. Do not scrub your skin.  Dry with a clean towel and dress in clean clothing.  Do not shave the surgical area for 10 days-2 weeks prior to surgery  because the razor can irritate skin and make it easier to develop an infection.  Make sure you, your family, and all healthcare providers clean their hands with soap and water or an alcohol based hand  before caring for you or your wound.      Day of surgery:    Your surgeon’s office will advise you of your arrival time for the day of surgery.    Upon arrival, a Pre-op nurse and Anesthesia provider will review your health history, obtain vital signs, and answer questions you may have. The anesthesia provider will also discuss the type of anesthesia that will be needed for your procedure, which may include general anesthesia. The only belongings needed at this time will be your home medications and if applicable your C-PAP/BI-PAP machine.  If you are staying overnight your family can leave the rest of your belongings in the car and bring them to your room later.  A Pre-op nurse will start an IV and you may receive medication in preparation for surgery, including something to help you relax.  Your family will be able to see you in the Pre-op area.  While you are in surgery your family should notify the waiting room  if they leave the waiting room area and provide a contact phone number.    IF you have any questions, you can call the Pre-Admission Department at [phone] or your surgeon's office.  Notify your surgeon if  you become sick, have a fever, productive cough, or cannot be here the day of surgery    Please be  aware that surgery does come with discomfort.  We want to make every effort to control your discomfort so please discuss any uncontrolled symptoms with your nurse.   Your doctor will most likely have prescribed pain medications.      If you are going home after surgery, you will receive individualized written care instructions before being discharged.  A responsible adult (over the age of 18) must drive you to and from the hospital on the day of your surgery and stay with you for 24 hours after anesthesia.    If you are staying overnight following surgery, you will be transported to your hospital room following the recovery period.  [facility] has all private rooms.    You may receive a survey regarding the care you received. Your feedback is very important and will be used to collect the necessary data to help us to continue to provide excellent care.     Deductibles and co-payments are collected on the day of service. Please be prepared to pay the required co-pay, deductible or deposit on the day of service as defined by your plan.

## 2025-05-08 NOTE — PAT
Pt here for PAT visit.  Pre-op tests completed, chg soap given, and instructions reviewed.  Instructed clears until 2 hrs prior to arrival time, voiced understanding. Pt aware to hold trulicity for surgery.

## 2025-05-13 ENCOUNTER — ANESTHESIA EVENT (OUTPATIENT)
Dept: PERIOP | Facility: HOSPITAL | Age: 60
End: 2025-05-13
Payer: MEDICARE

## 2025-05-14 ENCOUNTER — ANESTHESIA (OUTPATIENT)
Dept: PERIOP | Facility: HOSPITAL | Age: 60
End: 2025-05-14
Payer: MEDICARE

## 2025-05-14 ENCOUNTER — HOSPITAL ENCOUNTER (OUTPATIENT)
Facility: HOSPITAL | Age: 60
Discharge: HOME | End: 2025-05-15
Attending: UROLOGY | Admitting: UROLOGY
Payer: MEDICARE

## 2025-05-14 DIAGNOSIS — T83.490A MALFUNCTION OF PENILE PROSTHESIS, INITIAL ENCOUNTER: Primary | ICD-10-CM

## 2025-05-14 DIAGNOSIS — N52.9 ERECTILE DYSFUNCTION: ICD-10-CM

## 2025-05-14 LAB
CYTO UR: NORMAL
GLUCOSE BLDC GLUCOMTR-MCNC: 179 MG/DL (ref 70–130)
GLUCOSE BLDC GLUCOMTR-MCNC: 186 MG/DL (ref 70–130)
GLUCOSE BLDC GLUCOMTR-MCNC: 214 MG/DL (ref 70–130)
GLUCOSE BLDC GLUCOMTR-MCNC: 251 MG/DL (ref 70–130)
LAB AP CASE REPORT: NORMAL
LAB AP CLINICAL INFORMATION: NORMAL
PATH REPORT.FINAL DX SPEC: NORMAL
PATH REPORT.GROSS SPEC: NORMAL

## 2025-05-14 PROCEDURE — 25010000002 HYDRALAZINE PER 20 MG: Performed by: FAMILY MEDICINE

## 2025-05-14 PROCEDURE — 25010000002 MAGNESIUM SULFATE 2 GM/50ML SOLUTION: Performed by: NURSE ANESTHETIST, CERTIFIED REGISTERED

## 2025-05-14 PROCEDURE — G0378 HOSPITAL OBSERVATION PER HR: HCPCS

## 2025-05-14 PROCEDURE — 25010000002 DIPHENHYDRAMINE PER 50 MG: Performed by: NURSE ANESTHETIST, CERTIFIED REGISTERED

## 2025-05-14 PROCEDURE — 99222 1ST HOSP IP/OBS MODERATE 55: CPT | Performed by: FAMILY MEDICINE

## 2025-05-14 PROCEDURE — A9270 NON-COVERED ITEM OR SERVICE: HCPCS | Performed by: UROLOGY

## 2025-05-14 PROCEDURE — 63710000001 ESCITALOPRAM 10 MG TABLET: Performed by: UROLOGY

## 2025-05-14 PROCEDURE — C1813 PROSTHESIS, PENILE, INFLATAB: HCPCS | Performed by: UROLOGY

## 2025-05-14 PROCEDURE — 25010000002 DEXAMETHASONE PER 1 MG: Performed by: NURSE ANESTHETIST, CERTIFIED REGISTERED

## 2025-05-14 PROCEDURE — 63710000001 OXYCODONE-ACETAMINOPHEN 7.5-325 MG TABLET: Performed by: UROLOGY

## 2025-05-14 PROCEDURE — 63710000001 ZOLPIDEM 5 MG TABLET: Performed by: UROLOGY

## 2025-05-14 PROCEDURE — 94761 N-INVAS EAR/PLS OXIMETRY MLT: CPT

## 2025-05-14 PROCEDURE — 25810000003 LACTATED RINGERS PER 1000 ML: Performed by: NURSE ANESTHETIST, CERTIFIED REGISTERED

## 2025-05-14 PROCEDURE — 82948 REAGENT STRIP/BLOOD GLUCOSE: CPT

## 2025-05-14 PROCEDURE — 94640 AIRWAY INHALATION TREATMENT: CPT

## 2025-05-14 PROCEDURE — 25810000003 SODIUM CHLORIDE 0.9 % SOLUTION 250 ML FLEX CONT: Performed by: UROLOGY

## 2025-05-14 PROCEDURE — 25010000002 HYDROMORPHONE 1 MG/ML SOLUTION: Performed by: ANESTHESIOLOGY

## 2025-05-14 PROCEDURE — 63710000001 CARIPRAZINE HCL 1.5 MG CAPSULE: Performed by: UROLOGY

## 2025-05-14 PROCEDURE — 25010000002 HYDROMORPHONE 1 MG/ML SOLUTION: Performed by: UROLOGY

## 2025-05-14 PROCEDURE — 63710000001 METOPROLOL SUCCINATE XL 25 MG TABLET SUSTAINED-RELEASE 24 HOUR: Performed by: UROLOGY

## 2025-05-14 PROCEDURE — 25010000002 FAMOTIDINE 10 MG/ML SOLUTION: Performed by: NURSE ANESTHETIST, CERTIFIED REGISTERED

## 2025-05-14 PROCEDURE — A9270 NON-COVERED ITEM OR SERVICE: HCPCS | Performed by: FAMILY MEDICINE

## 2025-05-14 PROCEDURE — 25010000002 PROPOFOL 200 MG/20ML EMULSION: Performed by: NURSE ANESTHETIST, CERTIFIED REGISTERED

## 2025-05-14 PROCEDURE — 25010000002 FENTANYL CITRATE (PF) 50 MCG/ML SOLUTION: Performed by: NURSE ANESTHETIST, CERTIFIED REGISTERED

## 2025-05-14 PROCEDURE — 63710000001 METFORMIN ER 500 MG TABLET SUSTAINED-RELEASE 24 HOUR: Performed by: UROLOGY

## 2025-05-14 PROCEDURE — 63710000001 OXCARBAZEPINE 300 MG TABLET: Performed by: UROLOGY

## 2025-05-14 PROCEDURE — 25010000002 ONDANSETRON PER 1 MG: Performed by: UROLOGY

## 2025-05-14 PROCEDURE — 25010000002 VANCOMYCIN 1 G RECONSTITUTED SOLUTION 1 EACH VIAL: Performed by: UROLOGY

## 2025-05-14 PROCEDURE — 25010000002 VANCOMYCIN 1 G RECONSTITUTED SOLUTION: Performed by: NURSE ANESTHETIST, CERTIFIED REGISTERED

## 2025-05-14 PROCEDURE — 88300 SURGICAL PATH GROSS: CPT | Performed by: UROLOGY

## 2025-05-14 PROCEDURE — 25010000002 HYDROMORPHONE 1 MG/ML SOLUTION: Performed by: NURSE ANESTHETIST, CERTIFIED REGISTERED

## 2025-05-14 PROCEDURE — 63710000001 INSULIN LISPRO (HUMAN) PER 5 UNITS: Performed by: FAMILY MEDICINE

## 2025-05-14 PROCEDURE — 25010000002 GENTAMICIN PER 80 MG: Performed by: NURSE ANESTHETIST, CERTIFIED REGISTERED

## 2025-05-14 PROCEDURE — 25010000002 LIDOCAINE 2% SOLUTION: Performed by: NURSE ANESTHETIST, CERTIFIED REGISTERED

## 2025-05-14 PROCEDURE — 63710000001 TIZANIDINE 4 MG TABLET: Performed by: UROLOGY

## 2025-05-14 PROCEDURE — 25010000002 ONDANSETRON PER 1 MG: Performed by: NURSE ANESTHETIST, CERTIFIED REGISTERED

## 2025-05-14 DEVICE — NON-STACKABLE REAR TIP EXTENDERS FOR AMS 700 CX AND LGX CYLINDERS
Type: IMPLANTABLE DEVICE | Site: PENIS | Status: FUNCTIONAL
Brand: REAR TIP EXTENDER

## 2025-05-14 DEVICE — INFLATABLE PENILE PROSTHESIS, INHIBIZONE/PRECONNECTED - PENOSCROTAL 1 PUMP 2 CYLINDERS WITH 12 CM LONG PRECONNECT TUBING
Type: IMPLANTABLE DEVICE | Site: PENIS | Status: FUNCTIONAL
Brand: AMS 700 CX MS PUMP

## 2025-05-14 DEVICE — INFLATABLE PENILE PROSTHESIS WITH MS PUMP ACCESSORY KIT
Type: IMPLANTABLE DEVICE | Site: PENIS | Status: FUNCTIONAL
Brand: AMS 700 ACCESSORY KIT

## 2025-05-14 DEVICE — AMS 700 PENILE PROSTHESIS, 1 LOW PROFILE RESERVOIR, UP TO 100 ML, INHIBIZONE TREATED
Type: IMPLANTABLE DEVICE | Site: PENIS | Status: FUNCTIONAL
Brand: CONCEAL

## 2025-05-14 RX ORDER — ALBUTEROL SULFATE 0.83 MG/ML
SOLUTION RESPIRATORY (INHALATION)
Status: COMPLETED
Start: 2025-05-14 | End: 2025-05-14

## 2025-05-14 RX ORDER — FAMOTIDINE 10 MG/ML
20 INJECTION, SOLUTION INTRAVENOUS
Status: COMPLETED | OUTPATIENT
Start: 2025-05-14 | End: 2025-05-14

## 2025-05-14 RX ORDER — INSULIN LISPRO 100 [IU]/ML
2-7 INJECTION, SOLUTION INTRAVENOUS; SUBCUTANEOUS
Status: DISCONTINUED | OUTPATIENT
Start: 2025-05-14 | End: 2025-05-15 | Stop reason: HOSPADM

## 2025-05-14 RX ORDER — LIDOCAINE HYDROCHLORIDE 20 MG/ML
INJECTION, SOLUTION INFILTRATION; PERINEURAL AS NEEDED
Status: DISCONTINUED | OUTPATIENT
Start: 2025-05-14 | End: 2025-05-14 | Stop reason: SURG

## 2025-05-14 RX ORDER — FENTANYL CITRATE 50 UG/ML
INJECTION, SOLUTION INTRAMUSCULAR; INTRAVENOUS AS NEEDED
Status: DISCONTINUED | OUTPATIENT
Start: 2025-05-14 | End: 2025-05-14 | Stop reason: SURG

## 2025-05-14 RX ORDER — GLUCAGON 1 MG/ML
1 KIT INJECTION
Status: DISCONTINUED | OUTPATIENT
Start: 2025-05-14 | End: 2025-05-15 | Stop reason: HOSPADM

## 2025-05-14 RX ORDER — MAGNESIUM SULFATE HEPTAHYDRATE 40 MG/ML
INJECTION, SOLUTION INTRAVENOUS AS NEEDED
Status: DISCONTINUED | OUTPATIENT
Start: 2025-05-14 | End: 2025-05-14 | Stop reason: SURG

## 2025-05-14 RX ORDER — ONDANSETRON 2 MG/ML
4 INJECTION INTRAMUSCULAR; INTRAVENOUS ONCE AS NEEDED
Status: DISCONTINUED | OUTPATIENT
Start: 2025-05-14 | End: 2025-05-14 | Stop reason: HOSPADM

## 2025-05-14 RX ORDER — SODIUM CHLORIDE 0.9 % (FLUSH) 0.9 %
10 SYRINGE (ML) INJECTION EVERY 12 HOURS SCHEDULED
Status: DISCONTINUED | OUTPATIENT
Start: 2025-05-14 | End: 2025-05-14 | Stop reason: HOSPADM

## 2025-05-14 RX ORDER — ESCITALOPRAM OXALATE 10 MG/1
10 TABLET ORAL DAILY
Status: DISCONTINUED | OUTPATIENT
Start: 2025-05-14 | End: 2025-05-15 | Stop reason: HOSPADM

## 2025-05-14 RX ORDER — METFORMIN HYDROCHLORIDE 500 MG/1
1000 TABLET, EXTENDED RELEASE ORAL 2 TIMES DAILY
Status: DISCONTINUED | OUTPATIENT
Start: 2025-05-14 | End: 2025-05-15 | Stop reason: HOSPADM

## 2025-05-14 RX ORDER — DEXAMETHASONE SODIUM PHOSPHATE 4 MG/ML
4 INJECTION, SOLUTION INTRA-ARTICULAR; INTRALESIONAL; INTRAMUSCULAR; INTRAVENOUS; SOFT TISSUE ONCE AS NEEDED
Status: COMPLETED | OUTPATIENT
Start: 2025-05-14 | End: 2025-05-14

## 2025-05-14 RX ORDER — ONDANSETRON 4 MG/1
4 TABLET, ORALLY DISINTEGRATING ORAL EVERY 6 HOURS PRN
Status: DISCONTINUED | OUTPATIENT
Start: 2025-05-14 | End: 2025-05-15 | Stop reason: HOSPADM

## 2025-05-14 RX ORDER — NICOTINE POLACRILEX 4 MG
15 LOZENGE BUCCAL
Status: DISCONTINUED | OUTPATIENT
Start: 2025-05-14 | End: 2025-05-15 | Stop reason: HOSPADM

## 2025-05-14 RX ORDER — SODIUM CHLORIDE 9 MG/ML
40 INJECTION, SOLUTION INTRAVENOUS AS NEEDED
Status: DISCONTINUED | OUTPATIENT
Start: 2025-05-14 | End: 2025-05-15 | Stop reason: HOSPADM

## 2025-05-14 RX ORDER — OXCARBAZEPINE 300 MG/1
600 TABLET, FILM COATED ORAL 2 TIMES DAILY
Status: DISCONTINUED | OUTPATIENT
Start: 2025-05-14 | End: 2025-05-15 | Stop reason: HOSPADM

## 2025-05-14 RX ORDER — MIDAZOLAM HYDROCHLORIDE 2 MG/2ML
1 INJECTION, SOLUTION INTRAMUSCULAR; INTRAVENOUS
Status: DISCONTINUED | OUTPATIENT
Start: 2025-05-14 | End: 2025-05-14 | Stop reason: HOSPADM

## 2025-05-14 RX ORDER — LIDOCAINE HYDROCHLORIDE 10 MG/ML
0.5 INJECTION, SOLUTION EPIDURAL; INFILTRATION; INTRACAUDAL; PERINEURAL ONCE AS NEEDED
Status: DISCONTINUED | OUTPATIENT
Start: 2025-05-14 | End: 2025-05-14 | Stop reason: HOSPADM

## 2025-05-14 RX ORDER — SODIUM CHLORIDE 0.9 % (FLUSH) 0.9 %
10 SYRINGE (ML) INJECTION AS NEEDED
Status: DISCONTINUED | OUTPATIENT
Start: 2025-05-14 | End: 2025-05-14 | Stop reason: HOSPADM

## 2025-05-14 RX ORDER — SULFAMETHOXAZOLE AND TRIMETHOPRIM 800; 160 MG/1; MG/1
1 TABLET ORAL 2 TIMES DAILY
Qty: 28 TABLET | Refills: 0 | Status: SHIPPED | OUTPATIENT
Start: 2025-05-14

## 2025-05-14 RX ORDER — DIPHENHYDRAMINE HYDROCHLORIDE 50 MG/ML
12.5 INJECTION, SOLUTION INTRAMUSCULAR; INTRAVENOUS
Status: COMPLETED | OUTPATIENT
Start: 2025-05-14 | End: 2025-05-14

## 2025-05-14 RX ORDER — ALBUTEROL SULFATE 0.83 MG/ML
2.5 SOLUTION RESPIRATORY (INHALATION) EVERY 4 HOURS PRN
Status: DISCONTINUED | OUTPATIENT
Start: 2025-05-14 | End: 2025-05-15 | Stop reason: HOSPADM

## 2025-05-14 RX ORDER — DEXMEDETOMIDINE HYDROCHLORIDE 100 UG/ML
INJECTION, SOLUTION INTRAVENOUS AS NEEDED
Status: DISCONTINUED | OUTPATIENT
Start: 2025-05-14 | End: 2025-05-14 | Stop reason: SURG

## 2025-05-14 RX ORDER — HYDRALAZINE HYDROCHLORIDE 20 MG/ML
10 INJECTION INTRAMUSCULAR; INTRAVENOUS EVERY 4 HOURS PRN
Status: DISCONTINUED | OUTPATIENT
Start: 2025-05-14 | End: 2025-05-15 | Stop reason: HOSPADM

## 2025-05-14 RX ORDER — GENTAMICIN 40 MG/ML
INJECTION, SOLUTION INTRAMUSCULAR; INTRAVENOUS AS NEEDED
Status: DISCONTINUED | OUTPATIENT
Start: 2025-05-14 | End: 2025-05-14 | Stop reason: SURG

## 2025-05-14 RX ORDER — SODIUM CHLORIDE 0.9 % (FLUSH) 0.9 %
10 SYRINGE (ML) INJECTION AS NEEDED
Status: DISCONTINUED | OUTPATIENT
Start: 2025-05-14 | End: 2025-05-15 | Stop reason: HOSPADM

## 2025-05-14 RX ORDER — OXYCODONE AND ACETAMINOPHEN 7.5; 325 MG/1; MG/1
1 TABLET ORAL EVERY 4 HOURS PRN
Status: DISCONTINUED | OUTPATIENT
Start: 2025-05-14 | End: 2025-05-15 | Stop reason: HOSPADM

## 2025-05-14 RX ORDER — DEXTROSE MONOHYDRATE 25 G/50ML
25 INJECTION, SOLUTION INTRAVENOUS
Status: DISCONTINUED | OUTPATIENT
Start: 2025-05-14 | End: 2025-05-15 | Stop reason: HOSPADM

## 2025-05-14 RX ORDER — ONDANSETRON 2 MG/ML
4 INJECTION INTRAMUSCULAR; INTRAVENOUS ONCE AS NEEDED
Status: COMPLETED | OUTPATIENT
Start: 2025-05-14 | End: 2025-05-14

## 2025-05-14 RX ORDER — ONDANSETRON 2 MG/ML
4 INJECTION INTRAMUSCULAR; INTRAVENOUS EVERY 6 HOURS PRN
Status: DISCONTINUED | OUTPATIENT
Start: 2025-05-14 | End: 2025-05-15 | Stop reason: HOSPADM

## 2025-05-14 RX ORDER — NALOXONE HCL 0.4 MG/ML
0.1 VIAL (ML) INJECTION
Status: DISCONTINUED | OUTPATIENT
Start: 2025-05-14 | End: 2025-05-15 | Stop reason: HOSPADM

## 2025-05-14 RX ORDER — SODIUM CHLORIDE 0.9 % (FLUSH) 0.9 %
3 SYRINGE (ML) INJECTION EVERY 12 HOURS SCHEDULED
Status: DISCONTINUED | OUTPATIENT
Start: 2025-05-14 | End: 2025-05-15 | Stop reason: HOSPADM

## 2025-05-14 RX ORDER — HYDROCODONE BITARTRATE AND ACETAMINOPHEN 5; 325 MG/1; MG/1
1 TABLET ORAL ONCE AS NEEDED
Status: COMPLETED | OUTPATIENT
Start: 2025-05-14 | End: 2025-05-14

## 2025-05-14 RX ORDER — ALBUTEROL SULFATE 0.83 MG/ML
2.5 SOLUTION RESPIRATORY (INHALATION) EVERY 4 HOURS PRN
Status: DISCONTINUED | OUTPATIENT
Start: 2025-05-14 | End: 2025-05-14 | Stop reason: SDUPTHER

## 2025-05-14 RX ORDER — ZOLPIDEM TARTRATE 5 MG/1
10 TABLET ORAL NIGHTLY PRN
Status: DISCONTINUED | OUTPATIENT
Start: 2025-05-14 | End: 2025-05-15 | Stop reason: HOSPADM

## 2025-05-14 RX ORDER — METOPROLOL SUCCINATE 25 MG/1
25 TABLET, EXTENDED RELEASE ORAL DAILY
Status: DISCONTINUED | OUTPATIENT
Start: 2025-05-14 | End: 2025-05-15 | Stop reason: HOSPADM

## 2025-05-14 RX ORDER — FENTANYL CITRATE 50 UG/ML
25 INJECTION, SOLUTION INTRAMUSCULAR; INTRAVENOUS
Status: DISCONTINUED | OUTPATIENT
Start: 2025-05-14 | End: 2025-05-14 | Stop reason: HOSPADM

## 2025-05-14 RX ORDER — VANCOMYCIN HYDROCHLORIDE 1 G/20ML
INJECTION, POWDER, LYOPHILIZED, FOR SOLUTION INTRAVENOUS AS NEEDED
Status: DISCONTINUED | OUTPATIENT
Start: 2025-05-14 | End: 2025-05-14 | Stop reason: SURG

## 2025-05-14 RX ORDER — OXYCODONE AND ACETAMINOPHEN 7.5; 325 MG/1; MG/1
1 TABLET ORAL EVERY 4 HOURS PRN
Qty: 30 TABLET | Refills: 0 | Status: SHIPPED | OUTPATIENT
Start: 2025-05-14 | End: 2025-05-20

## 2025-05-14 RX ORDER — PROPOFOL 10 MG/ML
INJECTION, EMULSION INTRAVENOUS AS NEEDED
Status: DISCONTINUED | OUTPATIENT
Start: 2025-05-14 | End: 2025-05-14 | Stop reason: SURG

## 2025-05-14 RX ORDER — SODIUM CHLORIDE, SODIUM LACTATE, POTASSIUM CHLORIDE, CALCIUM CHLORIDE 600; 310; 30; 20 MG/100ML; MG/100ML; MG/100ML; MG/100ML
9 INJECTION, SOLUTION INTRAVENOUS CONTINUOUS PRN
Status: DISCONTINUED | OUTPATIENT
Start: 2025-05-14 | End: 2025-05-14 | Stop reason: HOSPADM

## 2025-05-14 RX ORDER — SODIUM CHLORIDE 9 MG/ML
40 INJECTION, SOLUTION INTRAVENOUS AS NEEDED
Status: DISCONTINUED | OUTPATIENT
Start: 2025-05-14 | End: 2025-05-14 | Stop reason: HOSPADM

## 2025-05-14 RX ORDER — SODIUM CHLORIDE 9 MG/ML
100 INJECTION, SOLUTION INTRAVENOUS CONTINUOUS
Status: ACTIVE | OUTPATIENT
Start: 2025-05-14 | End: 2025-05-14

## 2025-05-14 RX ORDER — ALBUTEROL SULFATE 90 UG/1
2 INHALANT RESPIRATORY (INHALATION) EVERY 4 HOURS PRN
Status: DISCONTINUED | OUTPATIENT
Start: 2025-05-14 | End: 2025-05-14

## 2025-05-14 RX ADMIN — ALBUTEROL SULFATE 2.5 MG: 2.5 SOLUTION RESPIRATORY (INHALATION) at 21:16

## 2025-05-14 RX ADMIN — CARIPRAZINE 3 MG: 1.5 CAPSULE, GELATIN COATED ORAL at 21:12

## 2025-05-14 RX ADMIN — DEXMEDETOMIDINE 4 MCG: 200 INJECTION, SOLUTION INTRAVENOUS at 10:00

## 2025-05-14 RX ADMIN — INSULIN LISPRO 2 UNITS: 100 INJECTION, SOLUTION INTRAVENOUS; SUBCUTANEOUS at 21:43

## 2025-05-14 RX ADMIN — HYDROMORPHONE HYDROCHLORIDE 1 MG: 1 INJECTION, SOLUTION INTRAMUSCULAR; INTRAVENOUS; SUBCUTANEOUS at 12:22

## 2025-05-14 RX ADMIN — METFORMIN HYDROCHLORIDE 1000 MG: 500 TABLET, EXTENDED RELEASE ORAL at 13:38

## 2025-05-14 RX ADMIN — PROPOFOL 170 MG: 10 INJECTION, EMULSION INTRAVENOUS at 09:36

## 2025-05-14 RX ADMIN — FENTANYL CITRATE 25 MCG: 50 INJECTION INTRAMUSCULAR; INTRAVENOUS at 10:00

## 2025-05-14 RX ADMIN — ZOLPIDEM TARTRATE 10 MG: 5 TABLET ORAL at 23:04

## 2025-05-14 RX ADMIN — HYDROMORPHONE HYDROCHLORIDE 0.5 MG: 1 INJECTION, SOLUTION INTRAMUSCULAR; INTRAVENOUS; SUBCUTANEOUS at 11:10

## 2025-05-14 RX ADMIN — DEXMEDETOMIDINE 8 MCG: 200 INJECTION, SOLUTION INTRAVENOUS at 09:36

## 2025-05-14 RX ADMIN — ONDANSETRON 4 MG: 2 INJECTION INTRAMUSCULAR; INTRAVENOUS at 07:19

## 2025-05-14 RX ADMIN — GENTAMICIN SULFATE 400 MG: 40 INJECTION, SOLUTION INTRAMUSCULAR; INTRAVENOUS at 09:56

## 2025-05-14 RX ADMIN — HYDROMORPHONE HYDROCHLORIDE 1 MG: 1 INJECTION, SOLUTION INTRAMUSCULAR; INTRAVENOUS; SUBCUTANEOUS at 12:09

## 2025-05-14 RX ADMIN — DIPHENHYDRAMINE HYDROCHLORIDE 12.5 MG: 50 INJECTION, SOLUTION INTRAMUSCULAR; INTRAVENOUS at 12:22

## 2025-05-14 RX ADMIN — METFORMIN HYDROCHLORIDE 1000 MG: 500 TABLET, EXTENDED RELEASE ORAL at 21:12

## 2025-05-14 RX ADMIN — FENTANYL CITRATE 25 MCG: 50 INJECTION INTRAMUSCULAR; INTRAVENOUS at 09:40

## 2025-05-14 RX ADMIN — DEXAMETHASONE SODIUM PHOSPHATE 4 MG: 4 INJECTION, SOLUTION INTRA-ARTICULAR; INTRALESIONAL; INTRAMUSCULAR; INTRAVENOUS; SOFT TISSUE at 07:19

## 2025-05-14 RX ADMIN — FENTANYL CITRATE 25 MCG: 50 INJECTION INTRAMUSCULAR; INTRAVENOUS at 11:41

## 2025-05-14 RX ADMIN — MAGNESIUM SULFATE HEPTAHYDRATE 2 G/HR: 40 INJECTION, SOLUTION INTRAVENOUS at 10:30

## 2025-05-14 RX ADMIN — HYDROMORPHONE HYDROCHLORIDE 0.5 MG: 1 INJECTION, SOLUTION INTRAMUSCULAR; INTRAVENOUS; SUBCUTANEOUS at 12:02

## 2025-05-14 RX ADMIN — DIPHENHYDRAMINE HYDROCHLORIDE 12.5 MG: 50 INJECTION, SOLUTION INTRAMUSCULAR; INTRAVENOUS at 12:09

## 2025-05-14 RX ADMIN — SODIUM CHLORIDE, POTASSIUM CHLORIDE, SODIUM LACTATE AND CALCIUM CHLORIDE 9 ML/HR: 600; 310; 30; 20 INJECTION, SOLUTION INTRAVENOUS at 07:19

## 2025-05-14 RX ADMIN — FENTANYL CITRATE 25 MCG: 50 INJECTION INTRAMUSCULAR; INTRAVENOUS at 11:52

## 2025-05-14 RX ADMIN — DEXMEDETOMIDINE 4 MCG: 200 INJECTION, SOLUTION INTRAVENOUS at 10:15

## 2025-05-14 RX ADMIN — HYDRALAZINE HYDROCHLORIDE 10 MG: 20 INJECTION INTRAMUSCULAR; INTRAVENOUS at 15:43

## 2025-05-14 RX ADMIN — TIZANIDINE HYDROCHLORIDE 4 MG: 4 TABLET ORAL at 23:04

## 2025-05-14 RX ADMIN — HYDROMORPHONE HYDROCHLORIDE 0.5 MG: 1 INJECTION, SOLUTION INTRAMUSCULAR; INTRAVENOUS; SUBCUTANEOUS at 10:15

## 2025-05-14 RX ADMIN — METOPROLOL SUCCINATE 25 MG: 25 TABLET, EXTENDED RELEASE ORAL at 13:38

## 2025-05-14 RX ADMIN — HYDROMORPHONE HYDROCHLORIDE 0.5 MG: 1 INJECTION, SOLUTION INTRAMUSCULAR; INTRAVENOUS; SUBCUTANEOUS at 11:53

## 2025-05-14 RX ADMIN — VANCOMYCIN HYDROCHLORIDE 1 G: 1 INJECTION, POWDER, LYOPHILIZED, FOR SOLUTION INTRAVENOUS at 10:05

## 2025-05-14 RX ADMIN — HYDROMORPHONE HYDROCHLORIDE 0.5 MG: 1 INJECTION, SOLUTION INTRAMUSCULAR; INTRAVENOUS; SUBCUTANEOUS at 10:08

## 2025-05-14 RX ADMIN — OXCARBAZEPINE 600 MG: 300 TABLET, FILM COATED ORAL at 21:12

## 2025-05-14 RX ADMIN — OXYCODONE HYDROCHLORIDE AND ACETAMINOPHEN 1 TABLET: 7.5; 325 TABLET ORAL at 15:37

## 2025-05-14 RX ADMIN — ESCITALOPRAM OXALATE 10 MG: 10 TABLET, FILM COATED ORAL at 13:38

## 2025-05-14 RX ADMIN — FAMOTIDINE 20 MG: 10 INJECTION INTRAVENOUS at 07:19

## 2025-05-14 RX ADMIN — ALBUTEROL SULFATE 2.5 MG: 0.83 SOLUTION RESPIRATORY (INHALATION) at 21:16

## 2025-05-14 RX ADMIN — LIDOCAINE HYDROCHLORIDE 100 MG: 20 INJECTION, SOLUTION INFILTRATION; PERINEURAL at 09:36

## 2025-05-14 RX ADMIN — INSULIN LISPRO 3 UNITS: 100 INJECTION, SOLUTION INTRAVENOUS; SUBCUTANEOUS at 17:19

## 2025-05-14 RX ADMIN — Medication 3 ML: at 13:38

## 2025-05-14 RX ADMIN — HYDROMORPHONE HYDROCHLORIDE 0.5 MG: 1 INJECTION, SOLUTION INTRAMUSCULAR; INTRAVENOUS; SUBCUTANEOUS at 10:30

## 2025-05-14 RX ADMIN — HYDROCODONE BITARTRATE AND ACETAMINOPHEN 1 TABLET: 5; 325 TABLET ORAL at 11:50

## 2025-05-14 RX ADMIN — VANCOMYCIN HYDROCHLORIDE 1000 MG: 1 INJECTION, POWDER, LYOPHILIZED, FOR SOLUTION INTRAVENOUS at 21:48

## 2025-05-14 RX ADMIN — HYDROMORPHONE HYDROCHLORIDE 1 MG: 1 INJECTION, SOLUTION INTRAMUSCULAR; INTRAVENOUS; SUBCUTANEOUS at 20:21

## 2025-05-14 RX ADMIN — ONDANSETRON 4 MG: 2 INJECTION INTRAMUSCULAR; INTRAVENOUS at 17:19

## 2025-05-14 NOTE — H&P
[facility] Surgical History and Physical    Patient Care Team:  Parris Glover MD as PCP - General (Family Medicine)    Chief complaint erectile dysfunction    Subjective     Patient is a 59 y.o. male presents with nonfunctional penile prosthesis.  Initially placed back in 2019 with a 23 cm  CX prosthesis measuring 21 cm +2 cm rear-tip extenders.  Reservoir was placed on the right side with the pump on the right side as well.     Review of Systems   Pertinent items are noted in HPI    Past Medical History:   Diagnosis Date    Anxiety and depression     Arthritis     Asthma     Bipolar disorder     COPD (chronic obstructive pulmonary disease)     Diabetes mellitus     DVT (deep venous thrombosis)     YEARS AGO IN LEFT LEG    Erectile dysfunction     Leg pain, bilateral     Low back pain     Neuropathy     Osteomyelitis     left foot ulcer, follows w/wound center at [place]    Penile implant failure     Sleep apnea     no machine     Past Surgical History:   Procedure Laterality Date    ANKLE OPEN REDUCTION INTERNAL FIXATION Right 01/02/2022    Procedure: ANKLE OPEN REDUCTION INTERNAL FIXATION;  Surgeon: Zeferino Montgomery MD;  Location[facility] OR;  Service: Orthopedics;  Laterality: Right;    CARDIAC CATHETERIZATION      2014, 2020    COLONOSCOPY      COLONOSCOPY W/ POLYPECTOMY N/A 12/12/2023    Procedure: COLONOSCOPY WITH POLYPECTOMY;  Surgeon: Evelyn Ontiveros DO;  Location:  [facility] OR;  Service: Gastroenterology;  Laterality: N/A;  poor prep, diverticulosis, left colon polyps x2 (hot snare x1)    COLONOSCOPY W/ POLYPECTOMY N/A 12/17/2024    Procedure: COLONOSCOPY WITH POLYPECTOMY;  Surgeon: Evelyn Ontiveros DO;  Location:  [facility] OR;  Service: Gastroenterology;  Laterality: N/A;  Diverticulosis; Transverse Polyp x1 (forcep); Sigmoid Polyp x1 (hot snare)    ENDOSCOPY      PENILE PROSTHESIS IMPLANT N/A 03/13/2019    Procedure: PENILE PROSTHESIS PLACEMENT;  Surgeon: Campos Hopkins  MD;  Location: [facility] MAIN OR;  Service: Urology    PENILE PROSTHESIS IMPLANT N/A 10/29/2020    Procedure: MANIPULATION OF PENILE PROSTHESIS;  Surgeon: Campos Hopkins MD;  Location: [facility] OR;  Service: Urology;  Laterality: N/A;    SINUS SURGERY      WOUND DEBRIDEMENT Left 2021    foot     Family History   Problem Relation Age of Onset    No Known Problems Mother     No Known Problems Father     Malig Hyperthermia Neg Hx      Social History     Tobacco Use    Smoking status: Never     Passive exposure: Never    Smokeless tobacco: Never   Vaping Use    Vaping status: Never Used   Substance Use Topics    Alcohol use: Yes     Alcohol/week: 30.0 standard drinks of alcohol     Types: 30 Cans of beer per week    Drug use: No       Meds:  Medications Prior to Admission   Medication Sig Dispense Refill Last Dose/Taking    albuterol sulfate  (90 Base) MCG/ACT inhaler Inhale 2 puffs Every 4 (Four) Hours As Needed for Wheezing. Use dos   5/14/2025    Cariprazine HCl (VRAYLAR) 3 MG capsule capsule Take 1 capsule by mouth Every Night.   Past Week    ESCITALOPRAM OXALATE PO Take 10 mg by mouth Daily. Take dos   Past Week    OXcarbazepine (TRILEPTAL) 600 MG tablet Take 1 tablet by mouth 2 (Two) Times a Day. Take dos   Past Week    pregabalin (LYRICA) 50 MG capsule Take 1 capsule by mouth 3 (Three) Times a Day. Take dos   Past Week    tiZANidine (ZANAFLEX) 4 MG tablet Take 1 tablet by mouth At Night As Needed for Muscle Spasms.   5/13/2025 Evening    zolpidem (AMBIEN) 10 MG tablet Take 1 tablet by mouth At Night As Needed for Sleep.   5/13/2025 Evening    metFORMIN ER (GLUCOPHAGE-XR) 500 MG 24 hr tablet Take 2 tablets by mouth 2 (Two) Times a Day.   5/12/2025    metoprolol succinate XL (TOPROL-XL) 25 MG 24 hr tablet Take 1 tablet by mouth Daily. Take dos   5/12/2025    Trulicity 4.5 MG/0.5ML solution auto-injector Inject 4.5 mg under the skin into the appropriate area as directed 1 (One) Time Per Week. Hold for  surgery   5/6/2025       Allergies:  Aspirin, Banana, and Penicillins    Debilities:  None    Objective     Vital Signs  Temp:  [98.1 °F (36.7 °C)] 98.1 °F (36.7 °C)  Heart Rate:  [81] 81  Resp:  [19] 19  BP: (129)/(79) 129/79  No intake or output data in the 24 hours ending 05/14/25 0754  Flowsheet Rows      Flowsheet Row First Filed Value   Admission Height --   Admission Weight 80 kg (176 lb 6.4 oz) Documented at 05/14/2025 0657             Physical Exam:     General Appearance:     Alert, cooperative, NAD   HEENT:     No trauma, pupils reactive, hearing intact   Back:      No CVA tenderness   Lungs:      Respirations unlabored, no wheezing    Heart:     RRR, intact peripheral pulses   Abdomen:      Soft, NDNT, no masses, no guarding   :     Deflated penile prosthesis nonfunctional   Extremities:    No edema, no deformity   Lymphatic:    No neck or groin LAD   Skin:    No bleeding, bruising or rashes   Neuro/Psych:    Orientation intact, mood/affect pleasant, no focal findings     Results Review:     Results for orders placed or performed during the hospital encounter of 05/14/25   POC Glucose Once    Collection Time: 05/14/25  7:12 AM    Specimen: Blood   Result Value Ref Range    Glucose 179 (H) 70 - 130 mg/dL       I reviewed the patient's prior history and old records to the best of my ability.   I have personally reviewed all pertinent imaging myself and their accompanying reports.   I have reviewed all labs.     Assessment:  Nonfunctional penile prosthesis    Plan:    Removal and replacement of penile prosthesis    I discussed the patient's findings and my recommendations with patient.   Risks, complications, outcomes and alternatives discussed with the patient at the bedside and office.    Campos Hopkins MD  05/14/25  07:54 EDT

## 2025-05-14 NOTE — ANESTHESIA PROCEDURE NOTES
Airway  Reason: elective    Date/Time: 5/14/2025 9:38 AM  Airway not difficult    General Information and Staff    Patient location during procedure: OR  CRNA/CAA: Ryan Mcdonnell CRNA    Indications and Patient Condition  Indications for airway management: airway protection    Preoxygenated: yes    Mask difficulty assessment: 0 - not attempted    Final Airway Details    Final airway type: supraglottic airway      Successful airway: unique  Size: 4   Number of attempts at approach: 1  Assessment: lips, teeth, and gum same as pre-op

## 2025-05-14 NOTE — OP NOTE
PENILE PROSTHESIS PLACEMENT  Procedure Note    Richard Kim  5/14/2025    Pre-op Diagnosis:   Nonfunctional penile implant    Post-op Diagnosis:     Post-Op Diagnosis Codes:     * Mechanical breakdown of penile prosthesis [T83.410A]    Procedure(s):  REMOVAL AND REPLACEMENT OF INFLATABLE PENILE PROSTHESIS    Surgeon(s):  Campos Hopkins MD    Anesthesia: General    Staff:   Circulator: Preethi Vogt RN  Scrub Person: Dominga Rios; Meredith Smith  Assistant: Raven Santos CSA    Estimated Blood Loss: minimal    Specimens:                Order Name Source Comment Collection Info Order Time   TISSUE PATHOLOGY EXAM Penis PLEASE SEND BACK TO THE [facility] RISK DEPARTMENT AFTER EVALUATED AND DOCUMENTED Collected By: Campos Hopkins MD 5/14/2025 10:47 AM     Release to patient   Routine Release              Drains:   Urethral Catheter Double-lumen;Non-latex 16 Fr. (Active)       Findings: Both corporal bodies were completely ruptured.  Growth of his penile corporal bodies into the inner sheath.  Both implants were completely removed.  Remeasured and still measures about 23 cm.  Conceal reservoir replaced.    Complications: None apparent    Indications: 59-year-old gentleman with history of rectal dysfunction.  In 2019 I placed a  CX penile prosthesis.  He did well with this and then showed up to my office couple months ago with complaints that it was nonfunctional.  On examination in the office he was completely evacuated of all fluid from his implant and would not cycle.  He appears to have a ruptured corporal bodies.  He now presents for replacement.  Risk and complication discussed with the patient.  Informed consent has been obtained.    Procedure: Patient was taken the operative suite after informed sent was obtained.  He was given general anesthesia.  Placed in comfort supine position.  He was given IV Vanco and gentamicin.  Kendall catheter was placed after surgical timeout.  A  midline penoscrotal incision was made.  The [place] retractor was utilized.  We cut down onto the control pump which was very much anterior displaced the scrotum on the right hemiscrotum anterior to the testicle and the penile pump was extracted.  We then cut down on all the tubing to the corporal bodies on each side.  Careful attention to the urethra.  Corporotomies made on each side overlying the tubing.  Investigation after making the corporotomies showed that the outer layer sheath had completely ruptured and there was ingrowth into the fibrous mesh of the inner layer.  Took some time but were able to dissected both of these corporal bodies open and dissected out all of the implants on each side.  I felt very comfortable got all the implant out I washed everything out with Irrisept.  Both sides had ruptured.  Now the difficult part was getting out his reservoir and I was able to use the same penoscrotal incision and dissected up and followed the tubing up to the external ring and then I opened this up with electrocautery opened up the preperitoneal space till the reservoir was removed.  The space seemed to be adequate I washed out with Irrisept I placed a new conceal flat reservoir in this and placed 100 cc in it.  Took a little bit of manipulation I had to stretch its and expanded it to accommodate the new reservoir but it Alberto seemed to fit nicely and there was not any obvious bulging.  I irrigated this Irrisept 1 more time and A clamp of 100 cc.  I then placed new cylinders on each side in a standard fashion.  We used a 21 700 CX prosthesis with 2 cm rear-tip extenders on each side.  I then pumped it up it seated well there was no bulging or bending.  I deflated it I irrigated the cylinders 1 more time with Irrisept and then I closed my corporotomies with interrupted 2-0 PDS.  I inflated at 1 more time to assure that I tendon missed any corporotomy to close I was happy with what I saw.  I deflated it I made  the single connection to the reservoir I inflated it again and then deflated it it cycled well.  I then took out the old sac that was around his control pump and I dissected out to the right hemiscrotum and placed our pump on the right side of the testicle.  We had a small tear in the tunica vaginalis with I closed with 2-0 chromic.  I irrigated the scrotal wound and I closed in 3 layers with 2-0 Vicryl 2-0 Vicryl and then 4-0 Monocryl interrupted horizontal mattress sutures.  Skin glue was applied.  He was woken and taken to recovery in stable condition.  Assistant: Raven Santos CSA was responsible for performing the following activities: Retraction, Suction, Irrigation, Suturing, Closing, and Placing Dressing and their skilled assistance was necessary for the success of this case.      Campos Hopkins MD     Date: 5/14/2025  Time: 11:47 EDT

## 2025-05-14 NOTE — ANESTHESIA PREPROCEDURE EVALUATION
Anesthesia Evaluation     Patient summary reviewed and Nursing notes reviewed   no history of anesthetic complications:   NPO Solid Status: > 8 hours  NPO Liquid Status: > 8 hours           Airway   Mallampati: II  TM distance: >3 FB  Neck ROM: full  No difficulty expected  Dental - normal exam     Pulmonary     breath sounds clear to auscultation  (+) COPD (daily inhaler use) moderate, asthma,sleep apnea  Cardiovascular - normal exam  Exercise tolerance: good (4-7 METS)    Rhythm: regular  Rate: normal    (+) hypertension well controlled less than 2 medications, DVT resolved, hyperlipidemia      Neuro/Psych  (+) numbness, psychiatric history Bipolar and Anxiety  GI/Hepatic/Renal/Endo    (+) diabetes mellitus type 2    Musculoskeletal     (+) back pain, chronic pain  Abdominal  - normal exam   Substance History   (+) alcohol use     OB/GYN negative ob/gyn ROS         Other   arthritis,                 Anesthesia Plan    ASA 2     general     intravenous induction     Anesthetic plan, risks, benefits, and alternatives have been provided, discussed and informed consent has been obtained with: patient.  Pre-procedure education provided  Use of blood products discussed with patient  Consented to blood products.      CODE STATUS:

## 2025-05-14 NOTE — ANESTHESIA POSTPROCEDURE EVALUATION
Patient: Richard Kim    Procedure Summary       Date: 05/14/25 Room / Location:  [facility] OR 4 /  [facility]    Anesthesia Start: 0933 Anesthesia Stop: 1137    Procedure: REMOVAL AND REPLACEMENT OF INFLATABLE PENILE PROSTHESIS (Penis) Diagnosis:       Mechanical breakdown of penile prosthesis      (Nonfunctional penile implant)    Surgeons: Campos Hopkins MD Provider:     Anesthesia Type: general ASA Status: 2            Anesthesia Type: general    Vitals  Vitals Value Taken Time   /92 05/14/25 12:20   Temp 97.5 °F (36.4 °C) 05/14/25 11:35   Pulse 88 05/14/25 12:24   Resp 20 05/14/25 11:55   SpO2 94 % 05/14/25 12:24   Vitals shown include unfiled device data.        Post Anesthesia Care and Evaluation    Patient location during evaluation: bedside  Patient participation: complete - patient participated  Level of consciousness: awake and alert  Pain score: 9  Pain management: satisfactory to patient    Airway patency: patent  Anesthetic complications: No anesthetic complications  PONV Status: none  Cardiovascular status: acceptable  Respiratory status: acceptable  Hydration status: acceptable  No anesthesia care post op

## 2025-05-15 VITALS
DIASTOLIC BLOOD PRESSURE: 60 MMHG | BODY MASS INDEX: 26.07 KG/M2 | OXYGEN SATURATION: 98 % | RESPIRATION RATE: 18 BRPM | HEIGHT: 69 IN | HEART RATE: 79 BPM | WEIGHT: 176 LBS | SYSTOLIC BLOOD PRESSURE: 125 MMHG | TEMPERATURE: 97.4 F

## 2025-05-15 LAB
ANION GAP SERPL CALCULATED.3IONS-SCNC: 10.1 MMOL/L (ref 5–15)
BUN SERPL-MCNC: 15 MG/DL (ref 8–23)
BUN/CREAT SERPL: 19.5 (ref 7–25)
CALCIUM SPEC-SCNC: 9 MG/DL (ref 8.6–10.5)
CHLORIDE SERPL-SCNC: 99 MMOL/L (ref 98–107)
CO2 SERPL-SCNC: 28.9 MMOL/L (ref 22–29)
CREAT SERPL-MCNC: 0.77 MG/DL (ref 0.76–1.27)
DEPRECATED RDW RBC AUTO: 46.6 FL (ref 37–54)
EGFRCR SERPLBLD CKD-EPI 2021: 101.9 ML/MIN/1.73
ERYTHROCYTE [DISTWIDTH] IN BLOOD BY AUTOMATED COUNT: 13.2 % (ref 12.3–15.4)
GLUCOSE BLDC GLUCOMTR-MCNC: 173 MG/DL (ref 70–130)
GLUCOSE BLDC GLUCOMTR-MCNC: 324 MG/DL (ref 70–130)
GLUCOSE SERPL-MCNC: 166 MG/DL (ref 65–99)
HCT VFR BLD AUTO: 44.6 % (ref 37.5–51)
HGB BLD-MCNC: 15.2 G/DL (ref 13–17.7)
MCH RBC QN AUTO: 32.3 PG (ref 26.6–33)
MCHC RBC AUTO-ENTMCNC: 34.1 G/DL (ref 31.5–35.7)
MCV RBC AUTO: 94.9 FL (ref 79–97)
PLATELET # BLD AUTO: 255 10*3/MM3 (ref 140–450)
PMV BLD AUTO: 10.3 FL (ref 6–12)
POTASSIUM SERPL-SCNC: 3.8 MMOL/L (ref 3.5–5.2)
RBC # BLD AUTO: 4.7 10*6/MM3 (ref 4.14–5.8)
SODIUM SERPL-SCNC: 138 MMOL/L (ref 136–145)
WBC NRBC COR # BLD AUTO: 12.09 10*3/MM3 (ref 3.4–10.8)

## 2025-05-15 PROCEDURE — 63710000001 OXCARBAZEPINE 300 MG TABLET: Performed by: UROLOGY

## 2025-05-15 PROCEDURE — 25010000002 GENTAMICIN PER 80 MG: Performed by: UROLOGY

## 2025-05-15 PROCEDURE — 85027 COMPLETE CBC AUTOMATED: CPT | Performed by: UROLOGY

## 2025-05-15 PROCEDURE — A9270 NON-COVERED ITEM OR SERVICE: HCPCS | Performed by: FAMILY MEDICINE

## 2025-05-15 PROCEDURE — 25810000003 SODIUM CHLORIDE 0.9 % SOLUTION 250 ML FLEX CONT: Performed by: UROLOGY

## 2025-05-15 PROCEDURE — G0378 HOSPITAL OBSERVATION PER HR: HCPCS

## 2025-05-15 PROCEDURE — 82948 REAGENT STRIP/BLOOD GLUCOSE: CPT

## 2025-05-15 PROCEDURE — A9270 NON-COVERED ITEM OR SERVICE: HCPCS | Performed by: UROLOGY

## 2025-05-15 PROCEDURE — 63710000001 INSULIN LISPRO (HUMAN) PER 5 UNITS: Performed by: FAMILY MEDICINE

## 2025-05-15 PROCEDURE — 63710000001 METFORMIN ER 500 MG TABLET SUSTAINED-RELEASE 24 HOUR: Performed by: UROLOGY

## 2025-05-15 PROCEDURE — 80048 BASIC METABOLIC PNL TOTAL CA: CPT | Performed by: UROLOGY

## 2025-05-15 PROCEDURE — 63710000001 OXYCODONE-ACETAMINOPHEN 7.5-325 MG TABLET: Performed by: UROLOGY

## 2025-05-15 PROCEDURE — 63710000001 METOPROLOL SUCCINATE XL 25 MG TABLET SUSTAINED-RELEASE 24 HOUR: Performed by: UROLOGY

## 2025-05-15 PROCEDURE — 63710000001 ESCITALOPRAM 10 MG TABLET: Performed by: UROLOGY

## 2025-05-15 PROCEDURE — 25010000002 VANCOMYCIN 1 G RECONSTITUTED SOLUTION 1 EACH VIAL: Performed by: UROLOGY

## 2025-05-15 RX ADMIN — INSULIN LISPRO 5 UNITS: 100 INJECTION, SOLUTION INTRAVENOUS; SUBCUTANEOUS at 12:48

## 2025-05-15 RX ADMIN — OXYCODONE HYDROCHLORIDE AND ACETAMINOPHEN 1 TABLET: 7.5; 325 TABLET ORAL at 08:27

## 2025-05-15 RX ADMIN — ESCITALOPRAM OXALATE 10 MG: 10 TABLET, FILM COATED ORAL at 08:27

## 2025-05-15 RX ADMIN — OXYCODONE HYDROCHLORIDE AND ACETAMINOPHEN 1 TABLET: 7.5; 325 TABLET ORAL at 04:14

## 2025-05-15 RX ADMIN — METOPROLOL SUCCINATE 25 MG: 25 TABLET, EXTENDED RELEASE ORAL at 08:27

## 2025-05-15 RX ADMIN — OXCARBAZEPINE 600 MG: 300 TABLET, FILM COATED ORAL at 08:27

## 2025-05-15 RX ADMIN — Medication 3 ML: at 08:26

## 2025-05-15 RX ADMIN — OXYCODONE HYDROCHLORIDE AND ACETAMINOPHEN 1 TABLET: 7.5; 325 TABLET ORAL at 12:40

## 2025-05-15 RX ADMIN — VANCOMYCIN HYDROCHLORIDE 1000 MG: 1 INJECTION, POWDER, LYOPHILIZED, FOR SOLUTION INTRAVENOUS at 09:40

## 2025-05-15 RX ADMIN — SODIUM CHLORIDE 400 MG: 9 INJECTION, SOLUTION INTRAVENOUS at 08:27

## 2025-05-15 RX ADMIN — METFORMIN HYDROCHLORIDE 1000 MG: 500 TABLET, EXTENDED RELEASE ORAL at 08:27

## 2025-05-15 NOTE — DISCHARGE SUMMARY
Date of Discharge:  05/15/2025    Discharge Diagnosis: Malfunctioning penile prosthesis    Presenting Problem/History of Present Illness  Malfunction of penile prosthesis [T83.490A]     59-year-old male with malfunctioning penile prosthesis.  I implanted this in 2019 it is now nonfunctional and will not cycle and appears to have a leak possibly from a rupture.  He is here for replacement.    Hospital Course  Patient is a 59 y.o. male presented with malfunctioning penile prosthesis.  He was taken to the OR where he underwent removal washout and replacement of his penile implant.  He was kept overnight for 24 hours of IV antibiotics.  Pain management as well.  He is having quite a bit of pain in his expected but is controlled.  Bruising as expected.  Signs of any hematoma within his scrotum.  Catheter will be removed to be discharged today.  Hospital service was consulted to manage his diabetes.    Procedures Performed  Procedure(s):  REMOVAL AND REPLACEMENT OF INFLATABLE PENILE PROSTHESIS       Consults:   Consults       Date and Time Order Name Status Description    5/14/2025 11:58 AM Inpatient Hospitalist Consult              Pertinent Test Results: labs: Routine labs.      Condition on Discharge: Good    Vital Signs  Temp:  [97.3 °F (36.3 °C)-97.9 °F (36.6 °C)] 97.9 °F (36.6 °C)  Heart Rate:  [] 85  Resp:  [15-20] 20  BP: (137-196)/() 145/69    Physical Exam:   Surgical site clean and intact.  No drainage.  Ecchymoses of the scrotum and the lower abdominal wall as expected.  Penile implant just slightly inflated as expected.    Discharge Disposition  Home or Self Care    Discharge Medications     Discharge Medications        New Medications        Instructions Start Date   sulfamethoxazole-trimethoprim 800-160 MG per tablet  Commonly known as: Bactrim DS   1 tablet, Oral, 2 Times Daily             Changes to Medications        Instructions Start Date   oxyCODONE-acetaminophen 7.5-325 MG per  tablet  Commonly known as: PERCOCET   1 tablet, Oral, Every 4 Hours PRN             Continue These Medications        Instructions Start Date   albuterol sulfate  (90 Base) MCG/ACT inhaler  Commonly known as: PROVENTIL HFA;VENTOLIN HFA;PROAIR HFA   2 puffs, Every 4 Hours PRN      Cariprazine HCl 3 MG capsule capsule  Commonly known as: VRAYLAR   3 mg, Nightly      ESCITALOPRAM OXALATE PO   10 mg, Daily      metFORMIN  MG 24 hr tablet  Commonly known as: GLUCOPHAGE-XR   1,000 mg, 2 Times Daily      metoprolol succinate XL 25 MG 24 hr tablet  Commonly known as: TOPROL-XL   Take 1 tablet by mouth Daily. Take dos      OXcarbazepine 600 MG tablet  Commonly known as: TRILEPTAL   Take 1 tablet by mouth 2 (Two) Times a Day. Take dos      pregabalin 50 MG capsule  Commonly known as: LYRICA   50 mg, 3 Times Daily      tiZANidine 4 MG tablet  Commonly known as: ZANAFLEX   4 mg, Nightly PRN      Trulicity 4.5 MG/0.5ML solution auto-injector  Generic drug: Dulaglutide   Inject 4.5 mg under the skin into the appropriate area as directed 1 (One) Time Per Week. Hold for surgery      zolpidem 10 MG tablet  Commonly known as: AMBIEN   10 mg, Nightly PRN               Discharge Diet: Regular diet    Activity at Discharge: Restrict activity for the next 2 to 3 weeks.    Follow-up Appointments  No future appointments.  Additional Instructions for the Follow-ups that You Need to Schedule       Discharge Follow-up with Specified Provider: Dr Campos Hopkins; 2 Weeks   As directed      To: Dr Campos Hopkins   Follow Up: 2 Weeks                Test Results Pending at Discharge       Campos Hopkins MD  05/15/25  07:11 EDT    Time: Discharge 15 min

## 2025-05-15 NOTE — DISCHARGE INSTR - APPOINTMENTS
Patient will get a call from Dr. Hopkins's  with follow up appointment date  and time. [phone]    Patient needs to call and make a hospital follow up with Dr. Parris Glover within 1-2 weeks of discharge. [phone]. I was unable to make this appointment due to no answer at the office.

## 2025-05-15 NOTE — PLAN OF CARE
Goal Outcome Evaluation:  Plan of Care Reviewed With: patient      Pain control and  no infection.

## 2025-05-15 NOTE — PROGRESS NOTES
Patient: Richard ATWOOD Judy  Procedure(s):  REMOVAL AND REPLACEMENT OF INFLATABLE PENILE PROSTHESIS  Anesthesia type: general    Patient location: [facility] Surgical Floor  Last vitals:   Vitals:    05/15/25 0826   BP: 126/65   Pulse: 77   Resp: 18   Temp: 97.8 °F (36.6 °C)   SpO2: 97%     Level of consciousness: awake, alert, and oriented    Post-anesthesia pain: adequate analgesia  Airway patency: patent  Respiratory: unassisted  Cardiovascular: stable and blood pressure at baseline  Hydration: euvolemic    Anesthetic complications: no

## 2025-05-15 NOTE — DISCHARGE INSTR - LAB
Patient will get a call from Dr. Hopkins's  with follow up appointment date  and time. [phone]    Patient needs to call and make a hospital follow up with Dr. Parris Glover within 1-2 weeks of discharge. [phone]. I was unable to make this appointment due to no answer at the office.

## 2025-05-15 NOTE — PLAN OF CARE
Goal Outcome Evaluation:  Plan of Care Reviewed With: patient, significant other        Progress: improving  Outcome Evaluation: D/C education, see D/C navigator.  Discharge teaching to pt and SO, Dinora Caceres; both verbalize understanding of discharge education. Pt to travel home with SO.

## 2025-05-16 ENCOUNTER — READMISSION MANAGEMENT (OUTPATIENT)
Dept: CALL CENTER | Facility: HOSPITAL | Age: 60
End: 2025-05-16
Payer: MEDICARE

## 2025-05-16 NOTE — OUTREACH NOTE
Prep Survey      Flowsheet Row Responses   [facility] facility patient discharged from? [place]   Is LACE score < 7 ? Yes   Eligibility Readm Mgmt   Discharge diagnosis Malfunction of penile prosthesis   Does the patient have one of the following disease processes/diagnoses(primary or secondary)? Other   Does the patient have Home health ordered? No   Is there a DME ordered? No   Prep survey completed? Yes            TERRIE FARAH - Registered Nurse

## 2025-05-16 NOTE — CASE MANAGEMENT/SOCIAL WORK
Case Management Discharge Note      Final Note: DC'd  home.         Selected Continued Care - Discharged on 5/15/2025 Admission date: 5/14/2025 - Discharge disposition: Home or Self Care      Destination    No services have been selected for the patient.                Durable Medical Equipment    No services have been selected for the patient.                Dialysis/Infusion    No services have been selected for the patient.                Home Medical Care    No services have been selected for the patient.                Therapy    No services have been selected for the patient.                Community Resources    No services have been selected for the patient.                Community & DME    No services have been selected for the patient.                         Final Discharge Disposition Code: 01 - home or self-care

## 2025-05-20 ENCOUNTER — HOSPITAL ENCOUNTER (EMERGENCY)
Facility: HOSPITAL | Age: 60
Discharge: HOME | End: 2025-05-20
Attending: EMERGENCY MEDICINE | Admitting: EMERGENCY MEDICINE
Payer: MEDICARE

## 2025-05-20 ENCOUNTER — READMISSION MANAGEMENT (OUTPATIENT)
Dept: CALL CENTER | Facility: HOSPITAL | Age: 60
End: 2025-05-20
Payer: MEDICARE

## 2025-05-20 ENCOUNTER — APPOINTMENT (OUTPATIENT)
Dept: CT IMAGING | Facility: HOSPITAL | Age: 60
End: 2025-05-20
Payer: MEDICARE

## 2025-05-20 VITALS
RESPIRATION RATE: 16 BRPM | DIASTOLIC BLOOD PRESSURE: 88 MMHG | WEIGHT: 217 LBS | HEART RATE: 90 BPM | BODY MASS INDEX: 32.14 KG/M2 | OXYGEN SATURATION: 97 % | SYSTOLIC BLOOD PRESSURE: 129 MMHG | TEMPERATURE: 98.7 F | HEIGHT: 69 IN

## 2025-05-20 DIAGNOSIS — R10.31 RIGHT LOWER QUADRANT PAIN: Primary | ICD-10-CM

## 2025-05-20 LAB
ALBUMIN SERPL-MCNC: 4 G/DL (ref 3.5–5.2)
ALBUMIN/GLOB SERPL: 1.3 G/DL
ALP SERPL-CCNC: 84 U/L (ref 39–117)
ALT SERPL W P-5'-P-CCNC: 21 U/L (ref 1–41)
ANION GAP SERPL CALCULATED.3IONS-SCNC: 9.8 MMOL/L (ref 5–15)
AST SERPL-CCNC: 21 U/L (ref 1–40)
BASOPHILS # BLD AUTO: 0.03 10*3/MM3 (ref 0–0.2)
BASOPHILS NFR BLD AUTO: 0.4 % (ref 0–1.5)
BILIRUB SERPL-MCNC: 0.5 MG/DL (ref 0–1.2)
BUN SERPL-MCNC: 10 MG/DL (ref 8–23)
BUN/CREAT SERPL: 10.5 (ref 7–25)
CALCIUM SPEC-SCNC: 9.2 MG/DL (ref 8.6–10.5)
CHLORIDE SERPL-SCNC: 96 MMOL/L (ref 98–107)
CO2 SERPL-SCNC: 27.2 MMOL/L (ref 22–29)
CREAT SERPL-MCNC: 0.95 MG/DL (ref 0.76–1.27)
DEPRECATED RDW RBC AUTO: 46.3 FL (ref 37–54)
EGFRCR SERPLBLD CKD-EPI 2021: 91.1 ML/MIN/1.73
EOSINOPHIL # BLD AUTO: 0.25 10*3/MM3 (ref 0–0.4)
EOSINOPHIL NFR BLD AUTO: 3.4 % (ref 0.3–6.2)
ERYTHROCYTE [DISTWIDTH] IN BLOOD BY AUTOMATED COUNT: 13 % (ref 12.3–15.4)
GLOBULIN UR ELPH-MCNC: 3.2 GM/DL
GLUCOSE SERPL-MCNC: 183 MG/DL (ref 65–99)
HCT VFR BLD AUTO: 44.3 % (ref 37.5–51)
HGB BLD-MCNC: 14.6 G/DL (ref 13–17.7)
IMM GRANULOCYTES # BLD AUTO: 0.01 10*3/MM3 (ref 0–0.05)
IMM GRANULOCYTES NFR BLD AUTO: 0.1 % (ref 0–0.5)
LYMPHOCYTES # BLD AUTO: 2.25 10*3/MM3 (ref 0.7–3.1)
LYMPHOCYTES NFR BLD AUTO: 30.6 % (ref 19.6–45.3)
MCH RBC QN AUTO: 31.7 PG (ref 26.6–33)
MCHC RBC AUTO-ENTMCNC: 33 G/DL (ref 31.5–35.7)
MCV RBC AUTO: 96.1 FL (ref 79–97)
MONOCYTES # BLD AUTO: 0.6 10*3/MM3 (ref 0.1–0.9)
MONOCYTES NFR BLD AUTO: 8.2 % (ref 5–12)
NEUTROPHILS NFR BLD AUTO: 4.22 10*3/MM3 (ref 1.7–7)
NEUTROPHILS NFR BLD AUTO: 57.3 % (ref 42.7–76)
PLATELET # BLD AUTO: 279 10*3/MM3 (ref 140–450)
PMV BLD AUTO: 9.6 FL (ref 6–12)
POTASSIUM SERPL-SCNC: 4.3 MMOL/L (ref 3.5–5.2)
PROT SERPL-MCNC: 7.2 G/DL (ref 6–8.5)
RBC # BLD AUTO: 4.61 10*6/MM3 (ref 4.14–5.8)
SODIUM SERPL-SCNC: 133 MMOL/L (ref 136–145)
WBC NRBC COR # BLD AUTO: 7.36 10*3/MM3 (ref 3.4–10.8)

## 2025-05-20 PROCEDURE — 80053 COMPREHEN METABOLIC PANEL: CPT | Performed by: EMERGENCY MEDICINE

## 2025-05-20 PROCEDURE — 99283 EMERGENCY DEPT VISIT LOW MDM: CPT | Performed by: EMERGENCY MEDICINE

## 2025-05-20 PROCEDURE — 85025 COMPLETE CBC W/AUTO DIFF WBC: CPT | Performed by: EMERGENCY MEDICINE

## 2025-05-20 RX ORDER — SODIUM CHLORIDE 0.9 % (FLUSH) 0.9 %
10 SYRINGE (ML) INJECTION AS NEEDED
Status: DISCONTINUED | OUTPATIENT
Start: 2025-05-20 | End: 2025-05-20 | Stop reason: HOSPADM

## 2025-05-20 RX ORDER — OXYCODONE AND ACETAMINOPHEN 7.5; 325 MG/1; MG/1
1 TABLET ORAL ONCE
Refills: 0 | Status: COMPLETED | OUTPATIENT
Start: 2025-05-20 | End: 2025-05-20

## 2025-05-20 RX ADMIN — OXYCODONE HYDROCHLORIDE AND ACETAMINOPHEN 1 TABLET: 7.5; 325 TABLET ORAL at 15:52

## 2025-05-20 NOTE — ED PROVIDER NOTES
Subjective   History of Present Illness  Patient presents 1 week after having a penis pump removed and a replacement installed.  Patient now is complaining of a lump and swelling in his right lower quadrant that was not there before.  Patient denies any vomiting, diarrhea, redness, bruising, shortness of breath, chest pain, near syncope, or fever.  Patient called the office today to get a follow-up and they could have been seen at the [facility] but family and patient said they would not be able to drive there today so the office advised the patient go to the emergency room.  On arrival Dr. Hopkins has been paged.        Review of Systems   All other systems reviewed and are negative.      Past Medical History:   Diagnosis Date    Anxiety and depression     Arthritis     Asthma     Bipolar disorder     COPD (chronic obstructive pulmonary disease)     Diabetes mellitus     DVT (deep venous thrombosis)     YEARS AGO IN LEFT LEG    Erectile dysfunction     Leg pain, bilateral     Low back pain     Neuropathy     Osteomyelitis     left foot ulcer, follows w/wound center at [facility]    Penile implant failure     Sleep apnea     no machine       Allergies   Allergen Reactions    Aspirin Anaphylaxis    Banana Anaphylaxis    Penicillins Anaphylaxis     Beta lactam allergy details  Antibiotic reaction: (!) swollen tongue  Age at reaction: child  Dose to reaction time: (!) minutes  Reason for antibiotic: ear infection  Epinephrine required for reaction?: no  Tolerated antibiotics: amoxicillin, augmentin, cephalexin          Past Surgical History:   Procedure Laterality Date    ANKLE OPEN REDUCTION INTERNAL FIXATION Right 01/02/2022    Procedure: ANKLE OPEN REDUCTION INTERNAL FIXATION;  Surgeon: Zeferino Montgomery MD;  Location: [facility];  Service: Orthopedics;  Laterality: Right;    CARDIAC CATHETERIZATION      2014, 2020    COLONOSCOPY      COLONOSCOPY W/ POLYPECTOMY N/A 12/12/2023    Procedure: COLONOSCOPY WITH  POLYPECTOMY;  Surgeon: Evelyn Ontiveros DO;  Location: [facility];  Service: Gastroenterology;  Laterality: N/A;  poor prep, diverticulosis, left colon polyps x2 (hot snare x1)    COLONOSCOPY W/ POLYPECTOMY N/A 12/17/2024    Procedure: COLONOSCOPY WITH POLYPECTOMY;  Surgeon: Evelyn Ontiveros DO;  Location: [facility];  Service: Gastroenterology;  Laterality: N/A;  Diverticulosis; Transverse Polyp x1 (forcep); Sigmoid Polyp x1 (hot snare)    ENDOSCOPY      PENILE PROSTHESIS IMPLANT N/A 03/13/2019    Procedure: PENILE PROSTHESIS PLACEMENT;  Surgeon: Campos Hopkins MD;  Location: [facility] OR;  Service: Urology    PENILE PROSTHESIS IMPLANT N/A 10/29/2020    Procedure: MANIPULATION OF PENILE PROSTHESIS;  Surgeon: Campos Hopkins MD;  Location: [facility] MA[facility] OR;  Service: Urology;  Laterality: N/A;    PENILE PROSTHESIS IMPLANT N/A 5/14/2025    Procedure: REMOVAL AND REPLACEMENT OF INFLATABLE PENILE PROSTHESIS;  Surgeon: Campos Hopkins MD;  Location: [facility];  Service: Urology;  Laterality: N/A;    SINUS SURGERY      WOUND DEBRIDEMENT Left 2021    foot       Family History   Problem Relation Age of Onset    No Known Problems Mother     No Known Problems Father     Malig Hyperthermia Neg Hx        Social History     Socioeconomic History    Marital status:    Tobacco Use    Smoking status: Never     Passive exposure: Never    Smokeless tobacco: Never   Vaping Use    Vaping status: Never Used   Substance and Sexual Activity    Alcohol use: Yes     Alcohol/week: 30.0 standard drinks of alcohol     Types: 30 Cans of beer per week    Drug use: No    Sexual activity: Defer           Objective   Physical Exam  Vitals and nursing note reviewed.   Constitutional:       Comments: Patient sitting in bed comfortably, talkative, friendly, no signs of distress.  Cooperative with exam.   HENT:      Head: Normocephalic.      Right Ear: External ear normal.      Left Ear: External ear normal.      Nose:  Nose normal.      Mouth/Throat:      Mouth: Mucous membranes are moist.      Pharynx: Oropharynx is clear.   Eyes:      Conjunctiva/sclera: Conjunctivae normal.   Pulmonary:      Effort: Pulmonary effort is normal.   Abdominal:      General: Abdomen is protuberant. There is no distension.      Palpations: Abdomen is soft.      Tenderness: There is no right CVA tenderness, left CVA tenderness or guarding.          Comments: Swelling at circled area but no tissue induration; active bowel sounds in bilateral quadrants.   Musculoskeletal:         General: No swelling or tenderness.      Cervical back: Neck supple.   Skin:     General: Skin is warm and dry.      Capillary Refill: Capillary refill takes 2 to 3 seconds.   Neurological:      Mental Status: He is alert and oriented to person, place, and time.   Psychiatric:         Mood and Affect: Mood normal.         Behavior: Behavior normal.         Procedures           ED Course  ED Course as of 05/21/25 1426   Tue May 20, 2025   1539 The sodium is 133, glucose 23, chloride 96, CMP is otherwise unremarkable [RC]   1540 The CBC is unremarkable [RC]      ED Course User Index  [RC] Ruperto Alfredo MD                                                       Medical Decision Making  Ddx hernia, seroma, complication from surgery, hematoma    Discussed patient with Dr. Hopkins and he said that that spot where the patient is experiencing discomfort is where the reservoir is for the pump and it is completely normal.  Unless the patient has redness or firmness in the area he should just follow-up in clinic.    Labs Reviewed  COMPREHENSIVE METABOLIC PANEL - Abnormal; Notable for the following components:     Glucose                       183 (*)                Sodium                        133 (*)                Chloride                      96 (*)              All other components within normal limits         Narrative: GFR Categories in Chronic Kidney Disease (CKD)                                               GFR Category          GFR (mL/min/1.73)    Interpretation                  G1                    90 or greater        Normal or high (1)                  G2                    60-89                Mild decrease (1)                  G3a                   45-59                Mild to moderate decrease                  G3b                   30-44                Moderate to severe decrease                  G4                    15-29                Severe decrease                  G5                    14 or less           Kidney failure                                    (1)In the absence of evidence of kidney disease, neither GFR category G1 or G2 fulfill the criteria for CKD.                                    eGFR calculation 2021 CKD-EPI creatinine equation, which does not include race as a factor  CBC WITH AUTO DIFFERENTIAL - Normal  CBC AND DIFFERENTIAL      Problems Addressed:  Right lower quadrant pain: complicated acute illness or injury    Amount and/or Complexity of Data Reviewed  Labs: ordered.    Risk  Prescription drug management.        Final diagnoses:   Right lower quadrant pain       ED Disposition  ED Disposition       ED Disposition   Discharge    Condition   Stable    Comment   --               Campos Hopkins MD  [facility] [address] [address] [phone]      follow up as directed by Dr. Hopkins         Medication List        ASK your doctor about these medications      oxyCODONE-acetaminophen 7.5-325 MG per tablet  Commonly known as: PERCOCET  Take 1 tablet by mouth Every 4 (Four) Hours As Needed for Moderate Pain for up to 5 days.  Ask about: Should I take this medication?                 Kris Daugherty MD  05/20/25 2708       Kris Daugherty MD  05/21/25 1421       Kris Daugherty MD  05/21/25 142

## 2025-05-20 NOTE — ED PROVIDER NOTES
Subjective   History of Present Illness    Review of Systems    Past Medical History:   Diagnosis Date    Anxiety and depression     Arthritis     Asthma     Bipolar disorder     COPD (chronic obstructive pulmonary disease)     Diabetes mellitus     DVT (deep venous thrombosis)     YEARS AGO IN LEFT LEG    Erectile dysfunction     Leg pain, bilateral     Low back pain     Neuropathy     Osteomyelitis     left foot ulcer, follows w/wound center at [facility]    Penile implant failure     Sleep apnea     no machine       Allergies   Allergen Reactions    Aspirin Anaphylaxis    Banana Anaphylaxis    Penicillins Anaphylaxis     Beta lactam allergy details  Antibiotic reaction: (!) swollen tongue  Age at reaction: child  Dose to reaction time: (!) minutes  Reason for antibiotic: ear infection  Epinephrine required for reaction?: no  Tolerated antibiotics: amoxicillin, augmentin, cephalexin          Past Surgical History:   Procedure Laterality Date    ANKLE OPEN REDUCTION INTERNAL FIXATION Right 01/02/2022    Procedure: ANKLE OPEN REDUCTION INTERNAL FIXATION;  Surgeon: Zeferino Montgomery MD;  Location: [facility];  Service: Orthopedics;  Laterality: Right;    CARDIAC CATHETERIZATION      2014, 2020    COLONOSCOPY      COLONOSCOPY W/ POLYPECTOMY N/A 12/12/2023    Procedure: COLONOSCOPY WITH POLYPECTOMY;  Surgeon: Evelyn Ontiveros DO;  Location: [facility] OR;  Service: Gastroenterology;  Laterality: N/A;  poor prep, diverticulosis, left colon polyps x2 (hot snare x1)    COLONOSCOPY W/ POLYPECTOMY N/A 12/17/2024    Procedure: COLONOSCOPY WITH POLYPECTOMY;  Surgeon: Evelyn Ontiveros DO;  Location: [facility] OR;  Service: Gastroenterology;  Laterality: N/A;  Diverticulosis; Transverse Polyp x1 (forcep); Sigmoid Polyp x1 (hot snare)    ENDOSCOPY      PENILE PROSTHESIS IMPLANT N/A 03/13/2019    Procedure: PENILE PROSTHESIS PLACEMENT;  Surgeon: Campos Hopkins MD;  Location: [facility] OR;  Service: Urology    PENILE  PROSTHESIS IMPLANT N/A 10/29/2020    Procedure: MANIPULATION OF PENILE PROSTHESIS;  Surgeon: Campos Hopkins MD;  Location:  [facility];  Service: Urology;  Laterality: N/A;    PENILE PROSTHESIS IMPLANT N/A 5/14/2025    Procedure: REMOVAL AND REPLACEMENT OF INFLATABLE PENILE PROSTHESIS;  Surgeon: Campos Hopkins MD;  Location:  [facility] OR;  Service: Urology;  Laterality: N/A;    SINUS SURGERY      WOUND DEBRIDEMENT Left 2021    foot       Family History   Problem Relation Age of Onset    No Known Problems Mother     No Known Problems Father     Malig Hyperthermia Neg Hx        Social History     Socioeconomic History    Marital status:    Tobacco Use    Smoking status: Never     Passive exposure: Never    Smokeless tobacco: Never   Vaping Use    Vaping status: Never Used   Substance and Sexual Activity    Alcohol use: Yes     Alcohol/week: 30.0 standard drinks of alcohol     Types: 30 Cans of beer per week    Drug use: No    Sexual activity: Defer           Objective   Physical Exam    Procedures           ED Course  ED Course as of 05/20/25 1543   Tue May 20, 2025   1539 The sodium is 133, glucose 23, chloride 96, CMP is otherwise unremarkable [RC]   1540 The CBC is unremarkable [RC]      ED Course User Index  [RC] Ruperto Alfredo MD      15:43 EDT, 05/20/25:  Dr. Hopkins has spoken with Dr. Daugherty and the decision has been made to discharge the patient home without any further imaging.  Dr. Daugherty, Dr. Hopkins said this is the reservoir for the penis pump and no further workup was necessary.  Patient should follow-up with Dr. Hopkins.  The patient was informed of this.  Patient is requesting something for pain.  Currently he has oxycodone 5 called into his preferred pharmacy.  He is requesting a pain pill to hold him over till he can get his pain meds filled.  The patient will be given an oxycodone 7.5 here in the emergency department and discharged per Dr. Daugherty and [place]  instructions.  Patient's questions were answered.  Patient will be discharged in stable condition.                                                 Medical Decision Making  Problems Addressed:  Right upper quadrant abdominal pain: complicated acute illness or injury    Amount and/or Complexity of Data Reviewed  Labs: ordered.    Risk  Prescription drug management.        Final diagnoses:   Right upper quadrant abdominal pain       ED Disposition  ED Disposition       ED Disposition   Discharge    Condition   Stable    Comment   --               No follow-up provider specified.       Medication List      No changes were made to your prescriptions during this visit.            Ruperto Alfredo MD  05/20/25 8502

## 2025-05-20 NOTE — OUTREACH NOTE
[facility] < 7 Survey      Flowsheet Row Responses   [facility] facility patient discharged from? [facility]   Does the patient have one of the following disease processes/diagnoses(primary or secondary)? Other   [facility] <7 Attempt successful? No   Unsuccessful attempts Attempt 1  [pt currently admitted to ED]            Antonina ANTUNEZ - Registered Nurse

## 2025-05-23 ENCOUNTER — READMISSION MANAGEMENT (OUTPATIENT)
Dept: CALL CENTER | Facility: HOSPITAL | Age: 60
End: 2025-05-23
Payer: MEDICARE

## 2025-05-23 NOTE — OUTREACH NOTE
[facility] < 7 Survey      Flowsheet Row Responses   [facility] facility patient discharged from? [facility]   Does the patient have one of the following disease processes/diagnoses(primary or secondary)? Other   [facility] <7 Attempt successful? No   Unsuccessful attempts Attempt 2            Monserrat CAMPBELL - Registered Nurse

## 2025-05-28 ENCOUNTER — READMISSION MANAGEMENT (OUTPATIENT)
Dept: CALL CENTER | Facility: HOSPITAL | Age: 60
End: 2025-05-28
Payer: MEDICARE

## 2025-05-28 NOTE — OUTREACH NOTE
[facility] < 7 Survey      Flowsheet Row Responses   [facility] patient discharged from? [facility]   Does the patient have one of the following disease processes/diagnoses(primary or secondary)? Other   [facility] <7 Attempt successful? Yes   Call start time 1425   Call end time 1428   Discharge diagnosis Malfunction of penile prosthesis   Meds reviewed with patient/caregiver? Yes   Is the patient having any side effects they believe may be caused by any medication additions or changes? No   Does the patient have all medications ordered at discharge? Yes   Is the patient taking all medications as directed (includes completed medication regime)? Yes   Does the patient have a primary care provider?  Yes   Has the patient kept scheduled appointments due by today? N/A   Has home health visited the patient within 72 hours of discharge? N/A   Psychosocial issues? No   Did the patient receive a copy of their discharge instructions? Yes   Nursing interventions Reviewed instructions with patient   What is the patient's perception of their health status since discharge? Improving   Is the patient/caregiver able to teach back signs and symptoms related to disease process for when to call PCP? Yes   Is the patient/caregiver able to teach back signs and symptoms related to disease process for when to call 911? Yes   Is the patient/caregiver able to teach back the hierarchy of who to call/visit for symptoms/problems? PCP, Specialist, Home health nurse, Urgent Care, ED, 911 Yes   Graduated Yes   Is the patient interested in additional calls from an ambulatory ? No   Wrap up additional comments Doing well, denies any questions or concers.            Monserrat CAMPBELL - Registered Nurse

## (undated) DEVICE — BW-412T DISP COMBO CLEANING BRUSH: Brand: SINGLE USE COMBINATION CLEANING BRUSH

## (undated) DEVICE — SNAR POLYP CAPTIFLX MICRO OVL 13MM 240CM

## (undated) DEVICE — DISPOSABLE INSERTION TOOL: Brand: FURLOW

## (undated) DEVICE — ANTIBACTERIAL UNDYED BRAIDED (POLYGLACTIN 910), SYNTHETIC ABSORBABLE SUTURE: Brand: COATED VICRYL

## (undated) DEVICE — FRCP BX RADJAW4 NDL 2.8 240CM LG OG BX40

## (undated) DEVICE — SKIN PREP TRAY 4 COMPARTM TRAY: Brand: MEDLINE INDUSTRIES, INC.

## (undated) DEVICE — TRAP FLD MINIVAC MEGADYNE 100ML

## (undated) DEVICE — BIT DRL QC SS 3.5X110MM

## (undated) DEVICE — KT ORCA ORCAPOD DISP STRL

## (undated) DEVICE — BIT DRL QC SS 2.0X100MM

## (undated) DEVICE — LINER SURG CANSTR SXN S/RIGD 1500CC

## (undated) DEVICE — SUT MNCRYL 4/0 PS2 18 IN

## (undated) DEVICE — GAUZE,SPONGE,4"X4",16PLY,XRAY,STRL,LF: Brand: MEDLINE

## (undated) DEVICE — IRRIGATOR BULB ASEPTO 60CC STRL

## (undated) DEVICE — GLV SURG SENSICARE PI LF PF 7.0

## (undated) DEVICE — ADAPT CLN BIOGUARD AIR/H2O DISP

## (undated) DEVICE — JELLY,LUBE,STERILE,FLIP TOP,TUBE,4-OZ: Brand: MEDLINE

## (undated) DEVICE — SYR LUERLOK 50ML

## (undated) DEVICE — SOL IRR H2O BO 1000ML STRL

## (undated) DEVICE — ADHS SKIN DERMABOND TOP ADVANCED

## (undated) DEVICE — RETR DEEP SCROTAL SKW

## (undated) DEVICE — SPK PIN NSR FLUID NONVNT 2WY MINI LL LF

## (undated) DEVICE — SYR CONTRL LUERLOK 10CC

## (undated) DEVICE — DISPOSABLE TOURNIQUET CUFF SINGLE BLADDER, SINGLE PORT AND QUICK CONNECT CONNECTOR: Brand: COLOR CUFF

## (undated) DEVICE — 1LYRTR 16FR10ML 100%SILI SNAP: Brand: MEDLINE INDUSTRIES, INC.

## (undated) DEVICE — BOWL PLSTC LG 32OZ BLU STRL

## (undated) DEVICE — 3M™ STERI-DRAPE™ U-DRAPE 1015: Brand: STERI-DRAPE™

## (undated) DEVICE — GLV SURG BIOGEL LTX PF 7

## (undated) DEVICE — SKIN PREP TRAY W/CHG: Brand: MEDLINE INDUSTRIES, INC.

## (undated) DEVICE — Device

## (undated) DEVICE — GLV SURG NEOLON 2G PF LF 7.5 STRL

## (undated) DEVICE — SUT VIC 3/0 SH CR8 18IN J864D

## (undated) DEVICE — BANDAGE,GAUZE,BULKEE II,4.5"X4.1YD,STRL: Brand: MEDLINE

## (undated) DEVICE — SYR LL TP 10ML STRL

## (undated) DEVICE — PENCL SMOKE/EVAC MEGADYNE TELESCP 10FT

## (undated) DEVICE — CATH FOL SIMPLYLATEX SILELAST 16F 17IN

## (undated) DEVICE — BIT DRL QC SS 2.7X100MM

## (undated) DEVICE — PENCL E/S ULTRAVAC TELESCP NOSE HOLSTR 10FT

## (undated) DEVICE — PREP SOL POVIDONE/IODINE BT 4OZ

## (undated) DEVICE — DRP C/ARM 41X74IN

## (undated) DEVICE — THE SINGLE USE ETRAP – POLYP TRAP IS USED FOR SUCTION RETRIEVAL OF ENDOSCOPICALLY REMOVED POLYPS.: Brand: ETRAP

## (undated) DEVICE — COVER,MAYO STAND,STERILE: Brand: MEDLINE

## (undated) DEVICE — IMPLANTABLE DEVICE
Type: IMPLANTABLE DEVICE | Site: ANKLE | Status: NON-FUNCTIONAL
Removed: 2022-01-02

## (undated) DEVICE — NDL HYPO SFTY GLD 22G 1 1/2IN

## (undated) DEVICE — DRAPE,REIN 53X77,STERILE: Brand: MEDLINE

## (undated) DEVICE — MAYO STAND COVER: Brand: CONVERTORS

## (undated) DEVICE — BOWL,STERILE,LARGE,32 OZ: Brand: MEDLINE

## (undated) DEVICE — GOWN,NON-REINFORCED,SIRUS,SET IN SLV,XL: Brand: MEDLINE

## (undated) DEVICE — PK PROC MAJ 40

## (undated) DEVICE — GLV SURG SENSICARE PI PF LF 7 GRN STRL

## (undated) DEVICE — SUT PROLN 2/0 SH 36IN 8523H

## (undated) DEVICE — VIAL FORMALIN CAP 10P 40ML

## (undated) DEVICE — SOL ISO/ALC RUB 70PCT 4OZ

## (undated) DEVICE — DRP C/ARMOR

## (undated) DEVICE — SUT PDS 2/0 SH 27IN Z317H

## (undated) DEVICE — SOL NACL 0.9PCT 1000ML

## (undated) DEVICE — DRSNG GZ PETROLTM XEROFORM CURAD 1X8IN STRL

## (undated) DEVICE — PK PROC MAJ 90

## (undated) DEVICE — BIT DRL QC SS 2.5X110MM

## (undated) DEVICE — DRSNG PAD ABD 8X10IN STRL

## (undated) DEVICE — SOL IRR H2O BTL 1000ML STRL

## (undated) DEVICE — 450 ML BOTTLE OF 0.05% CHLORHEXIDINE GLUCONATE IN 99.95% STERILE WATER FOR IRRIGATION, USP AND APPLICATOR.: Brand: IRRISEPT ANTIMICROBIAL WOUND LAVAGE

## (undated) DEVICE — PATIENT RETURN ELECTRODE, SINGLE-USE, CONTACT QUALITY MONITORING, ADULT, WITH 9FT CORD, FOR PATIENTS WEIGING OVER 33LBS. (15KG): Brand: MEGADYNE

## (undated) DEVICE — DRAINBAG,ANTI-REFLUX TOWER,L/F,2000ML,LL: Brand: MEDLINE

## (undated) DEVICE — BNDG ELAS MATRX V/CLS 4IN 5YD LF

## (undated) DEVICE — RL COTN ABSORB/COMPR 1/2LB 6IN 13FT 1PU

## (undated) DEVICE — GLOVE,SURG,SENSICARE,ALOE,LF,PF,7: Brand: MEDLINE

## (undated) DEVICE — SUT ETHLN 3/0 PS2 18 IN 1669H

## (undated) DEVICE — KT ACCSR PENILE AMS700 W/IMP/CONN

## (undated) DEVICE — INTENDED FOR TISSUE SEPARATION, AND OTHER PROCEDURES THAT REQUIRE A SHARP SURGICAL BLADE TO PUNCTURE OR CUT.: Brand: BARD-PARKER ® STAINLESS STEEL BLADES

## (undated) DEVICE — BNDG ESMARK 6INX9FT STRL

## (undated) DEVICE — BIT DRL STD 2.7MM

## (undated) DEVICE — DECANTER BAG 9": Brand: MEDLINE INDUSTRIES, INC.

## (undated) DEVICE — DECANT BG O JET

## (undated) DEVICE — SYR LUER/TIP MONOJECT RGD/PK 60CC STRL

## (undated) DEVICE — PAD UNDERCAST WYTEX 4IN 4YD LF STRL

## (undated) DEVICE — SYR LL W/SCALE/MARK 3ML STRL

## (undated) DEVICE — TB SXN FRAZIER 10F STRL

## (undated) DEVICE — BNDG ELAS ELITE V/CLOSE 6IN 5YD LF STRL

## (undated) DEVICE — APPL CHLORAPREP HI/LITE 26ML ORNG

## (undated) DEVICE — SPNG GZ WOVN 4X4IN 12PLY 10/BX STRL

## (undated) DEVICE — WEBRIL* CAST PADDING: Brand: DEROYAL

## (undated) DEVICE — TOWEL,OR,DSP,ST,BLUE,STD,4/PK,20PK/CS: Brand: MEDLINE

## (undated) DEVICE — MICRO HVTSA, 0.5G AND HVTSA SOURCEMARK PRODUCT CODE M1206 AND M1206-01: Brand: EXOFIN MICRO HVTSA, 0.5G

## (undated) DEVICE — PK BASIC ORTHO 90

## (undated) DEVICE — WAX,BONE,NATURAL
Type: IMPLANTABLE DEVICE | Site: ANKLE | Status: NON-FUNCTIONAL
Brand: MEDLINE INDUSTRIES
Removed: 2022-01-02

## (undated) DEVICE — BNDG COBAN S/ADHR WRP LF 6IN 5YD TN

## (undated) DEVICE — SNAR POLYP SENSATION STDOVL 27 240 BX40